# Patient Record
Sex: MALE | Race: BLACK OR AFRICAN AMERICAN | Employment: OTHER | URBAN - METROPOLITAN AREA
[De-identification: names, ages, dates, MRNs, and addresses within clinical notes are randomized per-mention and may not be internally consistent; named-entity substitution may affect disease eponyms.]

---

## 2023-10-26 ENCOUNTER — HOSPITAL ENCOUNTER (EMERGENCY)
Age: 38
Discharge: LEFT BEFORE DISCHARGE | End: 2023-10-26
Payer: MEDICARE

## 2023-10-26 ENCOUNTER — HOSPITAL ENCOUNTER (EMERGENCY)
Age: 38
Discharge: PSYCHIATRIC OR SUBSTANCE USE FACILITY | End: 2023-10-27
Payer: MEDICARE

## 2023-10-26 VITALS
HEIGHT: 70 IN | DIASTOLIC BLOOD PRESSURE: 88 MMHG | BODY MASS INDEX: 30.78 KG/M2 | HEART RATE: 103 BPM | WEIGHT: 215 LBS | OXYGEN SATURATION: 95 % | SYSTOLIC BLOOD PRESSURE: 154 MMHG | TEMPERATURE: 98.1 F

## 2023-10-26 DIAGNOSIS — F25.9 SCHIZOAFFECTIVE DISORDER, UNSPECIFIED TYPE (HCC): Primary | ICD-10-CM

## 2023-10-26 DIAGNOSIS — Z53.21 ELOPED FROM EMERGENCY DEPARTMENT: Primary | ICD-10-CM

## 2023-10-26 LAB
ALBUMIN SERPL-MCNC: 4.2 G/DL (ref 3.5–4.6)
ALP SERPL-CCNC: 79 U/L (ref 35–104)
ALT SERPL-CCNC: 66 U/L (ref 0–41)
AMPHET UR QL SCN: NORMAL
ANION GAP SERPL CALCULATED.3IONS-SCNC: 11 MEQ/L (ref 9–15)
APAP SERPL-MCNC: <5 UG/ML (ref 10–30)
AST SERPL-CCNC: 35 U/L (ref 0–40)
BARBITURATES UR QL SCN: NORMAL
BASOPHILS # BLD: 0 K/UL (ref 0–0.2)
BASOPHILS NFR BLD: 0.6 %
BENZODIAZ UR QL SCN: NORMAL
BILIRUB SERPL-MCNC: <0.2 MG/DL (ref 0.2–0.7)
BUN SERPL-MCNC: 10 MG/DL (ref 6–20)
CALCIUM SERPL-MCNC: 9.2 MG/DL (ref 8.5–9.9)
CANNABINOIDS UR QL SCN: NORMAL
CHLORIDE SERPL-SCNC: 100 MEQ/L (ref 95–107)
CHOLEST SERPL-MCNC: 219 MG/DL (ref 0–199)
CK SERPL-CCNC: 638 U/L (ref 0–190)
CO2 SERPL-SCNC: 24 MEQ/L (ref 20–31)
COCAINE UR QL SCN: NORMAL
CREAT SERPL-MCNC: 0.82 MG/DL (ref 0.7–1.2)
DRUG SCREEN COMMENT UR-IMP: NORMAL
EOSINOPHIL # BLD: 0.1 K/UL (ref 0–0.7)
EOSINOPHIL NFR BLD: 2 %
ERYTHROCYTE [DISTWIDTH] IN BLOOD BY AUTOMATED COUNT: 14.1 % (ref 11.5–14.5)
ETHANOL PERCENT: NORMAL G/DL
ETHANOLAMINE SERPL-MCNC: <10 MG/DL (ref 0–0.08)
FENTANYL SCREEN, URINE: NORMAL
GLOBULIN SER CALC-MCNC: 3.2 G/DL (ref 2.3–3.5)
GLUCOSE SERPL-MCNC: 110 MG/DL (ref 70–99)
HCT VFR BLD AUTO: 38.8 % (ref 42–52)
HDLC SERPL-MCNC: 56 MG/DL (ref 40–59)
HGB BLD-MCNC: 12.7 G/DL (ref 14–18)
LDLC SERPL CALC-MCNC: 106 MG/DL (ref 0–129)
LYMPHOCYTES # BLD: 2.5 K/UL (ref 1–4.8)
LYMPHOCYTES NFR BLD: 35.7 %
MCH RBC QN AUTO: 27.5 PG (ref 27–31.3)
MCHC RBC AUTO-ENTMCNC: 32.7 % (ref 33–37)
MCV RBC AUTO: 84.2 FL (ref 79–92.2)
METHADONE UR QL SCN: NORMAL
MONOCYTES # BLD: 0.9 K/UL (ref 0.2–0.8)
MONOCYTES NFR BLD: 12.5 %
NEUTROPHILS # BLD: 3.3 K/UL (ref 1.4–6.5)
NEUTS SEG NFR BLD: 47.8 %
OPIATES UR QL SCN: NORMAL
OXYCODONE UR QL SCN: NORMAL
PCP UR QL SCN: NORMAL
PLATELET # BLD AUTO: 366 K/UL (ref 130–400)
POTASSIUM SERPL-SCNC: 3.4 MEQ/L (ref 3.4–4.9)
PROPOXYPH UR QL SCN: NORMAL
PROT SERPL-MCNC: 7.4 G/DL (ref 6.3–8)
RBC # BLD AUTO: 4.61 M/UL (ref 4.7–6.1)
SALICYLATES SERPL-MCNC: <0.3 MG/DL (ref 15–30)
SODIUM SERPL-SCNC: 135 MEQ/L (ref 135–144)
TRIGL SERPL-MCNC: 286 MG/DL (ref 0–150)
TSH SERPL-MCNC: 1.08 UIU/ML (ref 0.44–3.86)
WBC # BLD AUTO: 6.9 K/UL (ref 4.8–10.8)

## 2023-10-26 PROCEDURE — 96372 THER/PROPH/DIAG INJ SC/IM: CPT

## 2023-10-26 PROCEDURE — 99285 EMERGENCY DEPT VISIT HI MDM: CPT

## 2023-10-26 PROCEDURE — 82077 ASSAY SPEC XCP UR&BREATH IA: CPT

## 2023-10-26 PROCEDURE — 80061 LIPID PANEL: CPT

## 2023-10-26 PROCEDURE — 84443 ASSAY THYROID STIM HORMONE: CPT

## 2023-10-26 PROCEDURE — 6360000002 HC RX W HCPCS: Performed by: STUDENT IN AN ORGANIZED HEALTH CARE EDUCATION/TRAINING PROGRAM

## 2023-10-26 PROCEDURE — 80307 DRUG TEST PRSMV CHEM ANLYZR: CPT

## 2023-10-26 PROCEDURE — 80053 COMPREHEN METABOLIC PANEL: CPT

## 2023-10-26 PROCEDURE — 36415 COLL VENOUS BLD VENIPUNCTURE: CPT

## 2023-10-26 PROCEDURE — 85025 COMPLETE CBC W/AUTO DIFF WBC: CPT

## 2023-10-26 PROCEDURE — 80179 DRUG ASSAY SALICYLATE: CPT

## 2023-10-26 PROCEDURE — 80143 DRUG ASSAY ACETAMINOPHEN: CPT

## 2023-10-26 PROCEDURE — 99283 EMERGENCY DEPT VISIT LOW MDM: CPT

## 2023-10-26 PROCEDURE — 82550 ASSAY OF CK (CPK): CPT

## 2023-10-26 RX ORDER — DIPHENHYDRAMINE HYDROCHLORIDE 50 MG/ML
50 INJECTION INTRAMUSCULAR; INTRAVENOUS ONCE
Status: COMPLETED | OUTPATIENT
Start: 2023-10-26 | End: 2023-10-26

## 2023-10-26 RX ORDER — HALOPERIDOL 5 MG/ML
5 INJECTION INTRAMUSCULAR ONCE
Status: COMPLETED | OUTPATIENT
Start: 2023-10-26 | End: 2023-10-26

## 2023-10-26 RX ORDER — LORAZEPAM 2 MG/ML
2 INJECTION INTRAMUSCULAR ONCE
Status: COMPLETED | OUTPATIENT
Start: 2023-10-26 | End: 2023-10-26

## 2023-10-26 RX ORDER — FLUOXETINE HYDROCHLORIDE 20 MG/1
40 CAPSULE ORAL DAILY
Qty: 60 CAPSULE | Refills: 0 | Status: SHIPPED | OUTPATIENT
Start: 2023-10-26 | End: 2023-11-25

## 2023-10-26 RX ADMIN — LORAZEPAM 2 MG: 2 INJECTION INTRAMUSCULAR; INTRAVENOUS at 15:23

## 2023-10-26 RX ADMIN — HALOPERIDOL LACTATE 5 MG: 5 INJECTION, SOLUTION INTRAMUSCULAR at 15:23

## 2023-10-26 RX ADMIN — DIPHENHYDRAMINE HYDROCHLORIDE 50 MG: 50 INJECTION INTRAMUSCULAR; INTRAVENOUS at 15:22

## 2023-10-26 ASSESSMENT — ENCOUNTER SYMPTOMS
ABDOMINAL PAIN: 0
VOMITING: 0
NAUSEA: 0
PHOTOPHOBIA: 0
SHORTNESS OF BREATH: 0
COUGH: 0
WHEEZING: 0
NAUSEA: 0
SHORTNESS OF BREATH: 0
PHOTOPHOBIA: 0
WHEEZING: 0
COUGH: 0
ABDOMINAL PAIN: 0
VOMITING: 0

## 2023-10-26 ASSESSMENT — PATIENT HEALTH QUESTIONNAIRE - PHQ9
SUM OF ALL RESPONSES TO PHQ QUESTIONS 1-9: 1
SUM OF ALL RESPONSES TO PHQ9 QUESTIONS 1 & 2: 1
SUM OF ALL RESPONSES TO PHQ QUESTIONS 1-9: 0
2. FEELING DOWN, DEPRESSED OR HOPELESS: 0
1. LITTLE INTEREST OR PLEASURE IN DOING THINGS: 0
SUM OF ALL RESPONSES TO PHQ9 QUESTIONS 1 & 2: 0
SUM OF ALL RESPONSES TO PHQ QUESTIONS 1-9: 0
SUM OF ALL RESPONSES TO PHQ QUESTIONS 1-9: 1
SUM OF ALL RESPONSES TO PHQ QUESTIONS 1-9: 0
SUM OF ALL RESPONSES TO PHQ QUESTIONS 1-9: 1
SUM OF ALL RESPONSES TO PHQ QUESTIONS 1-9: 0
SUM OF ALL RESPONSES TO PHQ QUESTIONS 1-9: 1
1. LITTLE INTEREST OR PLEASURE IN DOING THINGS: 0
2. FEELING DOWN, DEPRESSED OR HOPELESS: 1

## 2023-10-26 ASSESSMENT — LIFESTYLE VARIABLES
HOW OFTEN DO YOU HAVE A DRINK CONTAINING ALCOHOL: NEVER
HOW MANY STANDARD DRINKS CONTAINING ALCOHOL DO YOU HAVE ON A TYPICAL DAY: PATIENT DOES NOT DRINK

## 2023-10-26 ASSESSMENT — PAIN - FUNCTIONAL ASSESSMENT: PAIN_FUNCTIONAL_ASSESSMENT: NONE - DENIES PAIN

## 2023-10-26 ASSESSMENT — PAIN SCALES - GENERAL: PAINLEVEL_OUTOF10: 0
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
## 2023-10-26 NOTE — ED PROVIDER NOTES
[facility] ED  EMERGENCY DEPARTMENT ENCOUNTER      Pt Name: Merlin Sober  MRN: [id number]  [address] [place] 1985  Date of evaluation: 10/26/2023  Provider: Casey Rowland PA-C    CHIEF COMPLAINT     No chief complaint on file. HISTORY OF PRESENT ILLNESS   (Location/Symptom, Timing/Onset, Context/Setting, Quality, Duration, Modifying Factors, Severity)  Note limiting factors. Merlin Sober is a 41 y.o. male who per chart review has pmhx of bipolar 1 disorder, paranoid schizophrenia presents to the emergency department for psychiatric evaluation. Was sent here from [facility] facility. Per [facility], pt was sent there for drug rehab after being discharged from [facility]. He has been at [facility] for about 1 week. Apparently frandyr Luretha Lanes did not send the patient with any of his prescriptions and will not return their phone calls therefore he has been without his psychiatric meds for 1 week. They have noticed that he has been paranoid, believes the [place] is after him and that there are microphones in the ceiling. Pt was evaluated by In[facility] Quezada while at [facility] and was deemed to be psychotic but not appropriate to participate in In[facility] Spoon groups due to his psychosis. [facility] contacted [facility] for admission but they were told pt needed to come to [facility] for placement. Pt denies SI, HI, AVH. States he feels his schizophrenia is baseline. HPI    Nursing Notes were reviewed. REVIEW OF SYSTEMS    (2-9 systems for level 4, 10 or more for level 5)     Review of Systems   Constitutional:  Negative for chills and fever. HENT:  Negative for congestion. Eyes:  Negative for photophobia. Respiratory:  Negative for cough, shortness of breath and wheezing. Cardiovascular:  Negative for chest pain and palpitations. Gastrointestinal:  Negative for abdominal pain, nausea and vomiting.    Genitourinary:  Negative for dysuria, frequency and threatening to punch the window. Code violet was called. Pt given IM ativan benadryl and haldol at this time due to concern for staff and patient safety. Medically cleared for psychiatric evaluation. REASSESSMENT      Patient seen, evaluated and will be transferred to outside facility for inpatient psychiatric care    CRITICAL CARE TIME   Total Critical Care time was 0 minutes, excluding separately reportable procedures. There was a high probability of clinically significant/life threatening deterioration in the patient's condition which required my urgent intervention. CONSULTS:  None    PROCEDURES:  Unless otherwise noted below, none     Procedures        FINAL IMPRESSION      1. Schizoaffective disorder, unspecified type [facility])          DISPOSITION/PLAN   DISPOSITION Decision To Transfer 10/26/2023 07:55:03 PM      PATIENT REFERRED TO:  No follow-up provider specified.     DISCHARGE MEDICATIONS:  New Prescriptions    No medications on file     Controlled Substances Monitoring:          No data to display                (Please note that portions of this note were completed with a voice recognition program.  Efforts were made to edit the dictations but occasionally words are mis-transcribed.)    Junior James PA-C (electronically signed)    Supervising: Tashia Esquivel PA-C  10/26/23 1956

## 2023-10-26 NOTE — ED NOTES
Pt cooperative and resting in bed. No distress noted. Medication effective.      Maria Guadalupe Kemp, RN  10/26/23 789 [facility], [address] [place] [address] Dr RN  10/26/23 1396

## 2023-10-26 NOTE — ED NOTES
Report received from MYLES Davis and Zachary at [facility]. Patient was admitted to [facility] approximately one week ago after receiving treatment at [facility]. The patient arrived at rehab with no discharge medications sent to the pharmacy and patient has been off his prescribed medications. Hong Nelson reports the therapist at [facility] assessed the patient today for IOP and the patient admitted auditory hallucinations, paranoia and unable to participate in programming due to severe symptoms of his mental illness. Hong Nelson reports the patient will be able to return to [facility] after treatment/stabilization.       Edelmira Brewer RN  10/26/23 8492

## 2023-10-26 NOTE — ED NOTES
Pt. irritable, went into bathroom and punched wall and slammed the door. Then came up to window by nurses station with fist raised and swinging, threatening to punch the window and demanding for ativan.       Pavel Oliva RN  10/26/23 9902

## 2023-10-26 NOTE — ED TRIAGE NOTES
Pt presents to ER from recently being out of [facility] [address] and was not given the proper refill sent to his pharmacy. Pt is A&Ox4, warm and dry not medical issues noted at this time.

## 2023-10-26 NOTE — ED NOTES
Lab at bedside. Patient agreeable to the phlebotomist obtaining blood specimens then while drawing the ETOH patient moved arm and refused. Updated patient on process of medical clearance. Patient agreeable to allow labs to be completed. While at bedside for lab standby assist, patient was found with a pen in his possession. When pen was removed from patient began heckling the writer stating \"oh you are the MVP now ain't ya, you saved the day! I'm so glad you were able to save us all from that big bad pen bitch. \" Reviewed unit rules and safety of all patients. Patient refused teaching at this time.      Nathaniel Dan, RUPESH  10/26/23 8962

## 2023-10-26 NOTE — ED PROVIDER NOTES
[facility] ED  EMERGENCY DEPARTMENT ENCOUNTER      Pt Name: Mayuri Chaves  MRN: [id number]  [address] [facility] [place] 1985  Date of evaluation: 10/26/2023  Provider: Margaret Rangel, [address] [facility]       Chief Complaint   Patient presents with    Medication Refill         HISTORY OF PRESENT ILLNESS   (Location/Symptom, Timing/Onset, Context/Setting, Quality, Duration, Modifying Factors, Severity)  Note limiting factors. Mayuri Chaves is a 41 y.o. male who presents to the emergency department for med refill. States been out of prozac for 3 days. Takes 40 mg daily for the last several years. Otherwise compliant with other meds. No side effects. No other complaints at this time. HPI    Nursing Notes were reviewed. REVIEW OF SYSTEMS    (2-9 systems for level 4, 10 or more for level 5)     Review of Systems   Constitutional:  Negative for chills and fever. HENT:  Negative for congestion. Eyes:  Negative for photophobia. Respiratory:  Negative for cough, shortness of breath and wheezing. Cardiovascular:  Negative for chest pain and palpitations. Gastrointestinal:  Negative for abdominal pain, nausea and vomiting. Genitourinary:  Negative for dysuria, frequency and hematuria. Musculoskeletal:  Negative for myalgias. Allergic/Immunologic: Negative for immunocompromised state. Neurological:  Negative for dizziness, weakness and headaches. All other systems reviewed and are negative. Except as noted above the remainder of the review of systems was reviewed and negative. PAST MEDICAL HISTORY     Past Medical History:   Diagnosis Date    Bipolar 1 disorder ([address])     Paranoid ([address])     Schizo affective schizophrenia ([address])          SURGICAL HISTORY     No past surgical history on file.       CURRENT MEDICATIONS       Discharge Medication List as of 10/26/2023  1:16 PM        CONTINUE these medications which have NOT CHANGED    Details   OLANZapine (ZYPREXA PO) Take by that he was sent for a psychiatric evaluation. They state he has been with them for less than a week. Has been paranoid, believes there are microphones in the ceiling and that tavares is after him. Juan Mar states that he was not appropriate to do groups because of his psychosis. Pt was at [facility] prior to [employer]. [facility] get [facility] but were told that the patient needed to come to [facility] to be admitted there which is why he was sent here. Pt states that [facility] is \"making up lies\" and that he does not want to go back there and wants to call his family to come pick him up instead. He feels his schizophrenia is baseline and that Raya Crowder is not in crisis\" and has been taking his medications. He denies SI, HI, AVH. I had patient wait until I could discuss this with our [facility] unit. Plan was to bring patient back to [facility] unit while we cleared up the story with security at side. When security and I got back to fast track to escort him back to [facility], patient had eloped from the ED. Apparently pt had been dropped off by [facility] police to the ED however no report was given to us from either [facility] or police, they dropped patient off in triage and left therefore we not aware of the true reason he was brought to the ED. Pt was never pink slipped by anyone prior to his arrival. Pt found walking outside of the ED. Pt was pink slipped and brought back into the [facility] unit by [employer]. REASSESSMENT          CRITICAL CARE TIME   Total Critical Care time was 0 minutes, excluding separately reportable procedures. There was a high probability of clinically significant/life threatening deterioration in the patient's condition which required my urgent intervention. CONSULTS:  None    PROCEDURES:  Unless otherwise noted below, none     Procedures        FINAL IMPRESSION      1.  Eloped from emergency department          DISPOSITION/PLAN   DISPOSITION Eloped - Left

## 2023-10-26 NOTE — ED NOTES
Pt to [facility] bed 5. Changed into psych safe clothing and skin intact. Belongings secured and no contraband found. Lab notified of new pt and new orders. Brenna ARANGO to see new pt.      Scott Deleon RN  10/26/23 9008
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
## 2023-10-27 VITALS
SYSTOLIC BLOOD PRESSURE: 110 MMHG | HEART RATE: 92 BPM | OXYGEN SATURATION: 98 % | RESPIRATION RATE: 16 BRPM | TEMPERATURE: 97.6 F | DIASTOLIC BLOOD PRESSURE: 64 MMHG

## 2023-10-27 NOTE — ED NOTES
Patient resting quietly. Respirations are even and unlabored. No distress noted at this time.       Qing Bray RN  10/26/23 9399

## 2023-10-27 NOTE — ED NOTES
Patient resting quietly. Respirations are even and unlabored. No distress noted at this time.       Shen Vargas RN  10/26/23 2033

## 2023-10-27 NOTE — ED NOTES
Patient was transferred to [facility] [address]. He is calm and cooperative. [employer] present.       Angel Huynh RN  10/27/23 0021

## 2023-10-27 NOTE — ED NOTES
Patient resting quietly. Respirations are even and unlabored. No distress noted at this time.       Pablo Mitchell RN  10/26/23 0424

## 2023-11-04 ENCOUNTER — HOSPITAL ENCOUNTER (OUTPATIENT)
Facility: HOSPITAL | Age: 38
Setting detail: OBSERVATION
LOS: 1 days | Discharge: PSYCHIATRIC OR SUBSTANCE USE FACILITY | DRG: 558 | End: 2023-11-08
Attending: EMERGENCY MEDICINE | Admitting: STUDENT IN AN ORGANIZED HEALTH CARE EDUCATION/TRAINING PROGRAM
Payer: MEDICARE

## 2023-11-04 DIAGNOSIS — F25.0 SCHIZOAFFECTIVE DISORDER, BIPOLAR TYPE (MULTI): ICD-10-CM

## 2023-11-04 DIAGNOSIS — R74.8 ELEVATED CREATINE KINASE: Primary | ICD-10-CM

## 2023-11-04 PROBLEM — F19.959 PSYCHOACTIVE SUBSTANCE-INDUCED PSYCHOSIS (MULTI): Status: ACTIVE | Noted: 2023-10-06

## 2023-11-04 PROBLEM — F15.90 STIMULANT USE DISORDER: Status: RESOLVED | Noted: 2023-03-15 | Resolved: 2023-11-04

## 2023-11-04 PROBLEM — N28.9 RENAL DISEASE: Status: RESOLVED | Noted: 2019-07-23 | Resolved: 2023-11-04

## 2023-11-04 PROBLEM — F12.90 CANNABIS USE DISORDER: Status: RESOLVED | Noted: 2023-03-15 | Resolved: 2023-11-04

## 2023-11-04 PROBLEM — M62.82 RHABDOMYOLYSIS: Status: ACTIVE | Noted: 2023-10-05

## 2023-11-04 PROBLEM — F20.9 SCHIZOPHRENIA (MULTI): Status: ACTIVE | Noted: 2023-08-01

## 2023-11-04 PROBLEM — E87.8 ELECTROLYTE DISORDER: Status: RESOLVED | Noted: 2019-07-23 | Resolved: 2023-11-04
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
## 2023-11-04 PROBLEM — F15.929: Status: RESOLVED | Noted: 2019-07-24 | Resolved: 2023-11-04

## 2023-11-04 PROBLEM — E66.9 OBESITY, UNSPECIFIED: Status: ACTIVE | Noted: 2021-08-30

## 2023-11-04 PROBLEM — S81.859A: Status: RESOLVED | Noted: 2021-11-01 | Resolved: 2023-11-04

## 2023-11-04 PROBLEM — R45.850 HOMICIDAL THOUGHTS: Status: RESOLVED | Noted: 2023-11-04 | Resolved: 2023-11-04

## 2023-11-04 PROBLEM — F19.21: Chronic | Status: RESOLVED | Noted: 2022-04-15 | Resolved: 2023-11-04

## 2023-11-04 PROBLEM — R46.89 AGGRESSIVE BEHAVIOR: Status: RESOLVED | Noted: 2019-07-26 | Resolved: 2023-11-04

## 2023-11-04 PROBLEM — F19.959 SUBSTANCE-INDUCED PSYCHOTIC DISORDER (MULTI): Status: RESOLVED | Noted: 2023-03-13 | Resolved: 2023-11-04

## 2023-11-04 PROBLEM — F17.200 NICOTINE DEPENDENCE: Status: ACTIVE | Noted: 2021-08-30

## 2023-11-04 PROBLEM — L03.90 CELLULITIS: Status: RESOLVED | Noted: 2021-11-01 | Resolved: 2023-11-04

## 2023-11-04 PROBLEM — F20.0 CHRONIC PARANOID SCHIZOPHRENIA (MULTI): Status: ACTIVE | Noted: 2023-11-04

## 2023-11-04 PROBLEM — F23: Status: ACTIVE | Noted: 2021-11-01

## 2023-11-04 PROBLEM — F19.10 POLYSUBSTANCE ABUSE (MULTI): Status: ACTIVE | Noted: 2023-07-29

## 2023-11-04 PROBLEM — D75.839 THROMBOCYTOSIS: Chronic | Status: RESOLVED | Noted: 2021-11-04 | Resolved: 2023-11-04

## 2023-11-04 PROBLEM — F25.9 SCHIZOAFFECTIVE DISORDER (MULTI): Status: ACTIVE | Noted: 2021-08-30

## 2023-11-04 LAB
ALBUMIN SERPL BCP-MCNC: 4.5 G/DL (ref 3.4–5)
ALP SERPL-CCNC: 74 U/L (ref 33–120)
ALT SERPL W P-5'-P-CCNC: 65 U/L (ref 10–52)
ANION GAP SERPL CALC-SCNC: 13 MMOL/L (ref 10–20)
APAP SERPL-MCNC: <10 UG/ML
AST SERPL W P-5'-P-CCNC: 82 U/L (ref 9–39)
BASOPHILS # BLD AUTO: 0.02 X10*3/UL (ref 0–0.1)
BASOPHILS NFR BLD AUTO: 0.2 %
BILIRUB SERPL-MCNC: 1.3 MG/DL (ref 0–1.2)
BUN SERPL-MCNC: 19 MG/DL (ref 6–23)
CALCIUM SERPL-MCNC: 9.9 MG/DL (ref 8.6–10.3)
CHLORIDE SERPL-SCNC: 98 MMOL/L (ref 98–107)
CK SERPL-CCNC: 3410 U/L (ref 0–325)
CO2 SERPL-SCNC: 28 MMOL/L (ref 21–32)
CREAT SERPL-MCNC: 1.11 MG/DL (ref 0.5–1.3)
EOSINOPHIL # BLD AUTO: 0.09 X10*3/UL (ref 0–0.7)
EOSINOPHIL NFR BLD AUTO: 0.9 %
ERYTHROCYTE [DISTWIDTH] IN BLOOD BY AUTOMATED COUNT: 14.1 % (ref 11.5–14.5)
ETHANOL SERPL-MCNC: <10 MG/DL
GFR SERPL CREATININE-BSD FRML MDRD: 88 ML/MIN/1.73M*2
GLUCOSE SERPL-MCNC: 103 MG/DL (ref 74–99)
HCT VFR BLD AUTO: 38.3 % (ref 41–52)
HGB BLD-MCNC: 12.8 G/DL (ref 13.5–17.5)
IMM GRANULOCYTES # BLD AUTO: 0.02 X10*3/UL (ref 0–0.7)
IMM GRANULOCYTES NFR BLD AUTO: 0.2 % (ref 0–0.9)
LYMPHOCYTES # BLD AUTO: 2.6 X10*3/UL (ref 1.2–4.8)
LYMPHOCYTES NFR BLD AUTO: 26.4 %
MCH RBC QN AUTO: 27.8 PG (ref 26–34)
MCHC RBC AUTO-ENTMCNC: 33.4 G/DL (ref 32–36)
MCV RBC AUTO: 83 FL (ref 80–100)
MONOCYTES # BLD AUTO: 0.96 X10*3/UL (ref 0.1–1)
MONOCYTES NFR BLD AUTO: 9.7 %
NEUTROPHILS # BLD AUTO: 6.16 X10*3/UL (ref 1.2–7.7)
NEUTROPHILS NFR BLD AUTO: 62.6 %
NRBC BLD-RTO: 0 /100 WBCS (ref 0–0)
PLATELET # BLD AUTO: 412 X10*3/UL (ref 150–450)
POTASSIUM SERPL-SCNC: 4.2 MMOL/L (ref 3.5–5.3)
PROT SERPL-MCNC: 7.9 G/DL (ref 6.4–8.2)
RBC # BLD AUTO: 4.61 X10*6/UL (ref 4.5–5.9)
SALICYLATES SERPL-MCNC: <3 MG/DL
SARS-COV-2 RNA RESP QL NAA+PROBE: NOT DETECTED
SODIUM SERPL-SCNC: 135 MMOL/L (ref 136–145)
WBC # BLD AUTO: 9.9 X10*3/UL (ref 4.4–11.3)

## 2023-11-04 PROCEDURE — 96372 THER/PROPH/DIAG INJ SC/IM: CPT | Performed by: STUDENT IN AN ORGANIZED HEALTH CARE EDUCATION/TRAINING PROGRAM

## 2023-11-04 PROCEDURE — 2500000004 HC RX 250 GENERAL PHARMACY W/ HCPCS (ALT 636 FOR OP/ED): Performed by: STUDENT IN AN ORGANIZED HEALTH CARE EDUCATION/TRAINING PROGRAM

## 2023-11-04 PROCEDURE — 80053 COMPREHEN METABOLIC PANEL: CPT

## 2023-11-04 PROCEDURE — 87635 SARS-COV-2 COVID-19 AMP PRB: CPT | Performed by: EMERGENCY MEDICINE

## 2023-11-04 PROCEDURE — G0378 HOSPITAL OBSERVATION PER HR: HCPCS

## 2023-11-04 PROCEDURE — 80329 ANALGESICS NON-OPIOID 1 OR 2: CPT

## 2023-11-04 PROCEDURE — 99285 EMERGENCY DEPT VISIT HI MDM: CPT | Performed by: EMERGENCY MEDICINE

## 2023-11-04 PROCEDURE — 2500000001 HC RX 250 WO HCPCS SELF ADMINISTERED DRUGS (ALT 637 FOR MEDICARE OP): Performed by: STUDENT IN AN ORGANIZED HEALTH CARE EDUCATION/TRAINING PROGRAM

## 2023-11-04 PROCEDURE — 36415 COLL VENOUS BLD VENIPUNCTURE: CPT

## 2023-11-04 PROCEDURE — 2500000004 HC RX 250 GENERAL PHARMACY W/ HCPCS (ALT 636 FOR OP/ED)

## 2023-11-04 PROCEDURE — 85025 COMPLETE CBC W/AUTO DIFF WBC: CPT

## 2023-11-04 PROCEDURE — 82550 ASSAY OF CK (CPK): CPT

## 2023-11-04 PROCEDURE — 99223 1ST HOSP IP/OBS HIGH 75: CPT | Performed by: STUDENT IN AN ORGANIZED HEALTH CARE EDUCATION/TRAINING PROGRAM

## 2023-11-04 PROCEDURE — 93010 ELECTROCARDIOGRAM REPORT: CPT | Performed by: INTERNAL MEDICINE

## 2023-11-04 PROCEDURE — 1200000002 HC GENERAL ROOM WITH TELEMETRY DAILY

## 2023-11-04 PROCEDURE — 96361 HYDRATE IV INFUSION ADD-ON: CPT

## 2023-11-04 PROCEDURE — 1100000001 HC PRIVATE ROOM DAILY

## 2023-11-04 PROCEDURE — 96360 HYDRATION IV INFUSION INIT: CPT

## 2023-11-04 RX ORDER — ACETAMINOPHEN 325 MG/1
650 TABLET ORAL ONCE
Status: COMPLETED | OUTPATIENT
Start: 2023-11-04 | End: 2023-11-04

## 2023-11-04 RX ORDER — FLUOXETINE HYDROCHLORIDE 20 MG/1
40 CAPSULE ORAL DAILY
Status: DISCONTINUED | OUTPATIENT
Start: 2023-11-05 | End: 2023-11-09 | Stop reason: HOSPADM

## 2023-11-04 RX ORDER — ACETAMINOPHEN 650 MG/1
650 SUPPOSITORY RECTAL EVERY 4 HOURS PRN
Status: DISCONTINUED | OUTPATIENT
Start: 2023-11-04 | End: 2023-11-09 | Stop reason: HOSPADM

## 2023-11-04 RX ORDER — OLANZAPINE 5 MG/1
5 TABLET ORAL NIGHTLY
COMMUNITY

## 2023-11-04 RX ORDER — MICONAZOLE NITRATE 2 %
4 CREAM (GRAM) TOPICAL AS NEEDED
Status: DISCONTINUED | OUTPATIENT
Start: 2023-11-04 | End: 2023-11-09 | Stop reason: HOSPADM

## 2023-11-04 RX ORDER — SODIUM CHLORIDE, SODIUM LACTATE, POTASSIUM CHLORIDE, CALCIUM CHLORIDE 600; 310; 30; 20 MG/100ML; MG/100ML; MG/100ML; MG/100ML
75 INJECTION, SOLUTION INTRAVENOUS CONTINUOUS
Status: DISCONTINUED | OUTPATIENT
Start: 2023-11-04 | End: 2023-11-09 | Stop reason: HOSPADM

## 2023-11-04 RX ORDER — TOPIRAMATE 25 MG/1
25 TABLET ORAL NIGHTLY
Status: DISCONTINUED | OUTPATIENT
Start: 2023-11-04 | End: 2023-11-09 | Stop reason: HOSPADM

## 2023-11-04 RX ORDER — ACETAMINOPHEN 160 MG/5ML
650 SOLUTION ORAL EVERY 4 HOURS PRN
Status: DISCONTINUED | OUTPATIENT
Start: 2023-11-04 | End: 2023-11-09 | Stop reason: HOSPADM

## 2023-11-04 RX ORDER — TOPIRAMATE 25 MG/1
25 TABLET ORAL NIGHTLY
COMMUNITY
End: 2024-04-13 | Stop reason: ENTERED-IN-ERROR

## 2023-11-04 RX ORDER — LORAZEPAM 2 MG/ML
1 INJECTION INTRAMUSCULAR ONCE
Status: COMPLETED | OUTPATIENT
Start: 2023-11-04 | End: 2023-11-04

## 2023-11-04 RX ORDER — LURASIDONE HYDROCHLORIDE 40 MG/1
40 TABLET, FILM COATED ORAL
Status: DISCONTINUED | OUTPATIENT
Start: 2023-11-05 | End: 2023-11-09 | Stop reason: HOSPADM

## 2023-11-04 RX ORDER — ONDANSETRON HYDROCHLORIDE 2 MG/ML
4 INJECTION, SOLUTION INTRAVENOUS EVERY 8 HOURS PRN
Status: DISCONTINUED | OUTPATIENT
Start: 2023-11-04 | End: 2023-11-09 | Stop reason: HOSPADM

## 2023-11-04 RX ORDER — ACETAMINOPHEN 325 MG/1
650 TABLET ORAL EVERY 4 HOURS PRN
Status: DISCONTINUED | OUTPATIENT
Start: 2023-11-04 | End: 2023-11-09 | Stop reason: HOSPADM

## 2023-11-04 RX ORDER — OLANZAPINE 5 MG/1
10 TABLET, ORALLY DISINTEGRATING ORAL ONCE
Status: COMPLETED | OUTPATIENT
Start: 2023-11-04 | End: 2023-11-04

## 2023-11-04 RX ORDER — LURASIDONE HYDROCHLORIDE 40 MG/1
40 TABLET, FILM COATED ORAL
COMMUNITY
End: 2024-04-13 | Stop reason: ENTERED-IN-ERROR

## 2023-11-04 RX ORDER — POLYETHYLENE GLYCOL 3350 17 G/17G
17 POWDER, FOR SOLUTION ORAL DAILY
Status: DISCONTINUED | OUTPATIENT
Start: 2023-11-05 | End: 2023-11-09 | Stop reason: HOSPADM

## 2023-11-04 RX ORDER — DIPHENHYDRAMINE HCL 25 MG
4 CAPSULE ORAL AS NEEDED
COMMUNITY
End: 2024-04-13 | Stop reason: ENTERED-IN-ERROR

## 2023-11-04 RX ORDER — FLUOXETINE HYDROCHLORIDE 40 MG/1
40 CAPSULE ORAL DAILY
COMMUNITY
End: 2024-04-13 | Stop reason: ENTERED-IN-ERROR

## 2023-11-04 RX ORDER — CHOLECALCIFEROL (VITAMIN D3) 1250 MCG
1250 TABLET ORAL
COMMUNITY
End: 2024-04-13 | Stop reason: ENTERED-IN-ERROR

## 2023-11-04 RX ORDER — ONDANSETRON 4 MG/1
4 TABLET, ORALLY DISINTEGRATING ORAL EVERY 8 HOURS PRN
Status: DISCONTINUED | OUTPATIENT
Start: 2023-11-04 | End: 2023-11-09 | Stop reason: HOSPADM

## 2023-11-04 RX ORDER — ENOXAPARIN SODIUM 100 MG/ML
40 INJECTION SUBCUTANEOUS EVERY 24 HOURS
Status: DISCONTINUED | OUTPATIENT
Start: 2023-11-04 | End: 2023-11-09 | Stop reason: HOSPADM

## 2023-11-04 RX ADMIN — TOPIRAMATE 25 MG: 25 TABLET, FILM COATED ORAL at 21:59

## 2023-11-04 RX ADMIN — OLANZAPINE 15 MG: 10 TABLET, FILM COATED ORAL at 21:59

## 2023-11-04 RX ADMIN — SODIUM CHLORIDE, POTASSIUM CHLORIDE, SODIUM LACTATE AND CALCIUM CHLORIDE 75 ML/HR: 600; 310; 30; 20 INJECTION, SOLUTION INTRAVENOUS at 21:58

## 2023-11-04 RX ADMIN — OLANZAPINE 10 MG: 5 TABLET, ORALLY DISINTEGRATING ORAL at 16:22

## 2023-11-04 RX ADMIN — LORAZEPAM 1 MG: 2 INJECTION INTRAMUSCULAR; INTRAVENOUS at 15:46

## 2023-11-04 RX ADMIN — SODIUM CHLORIDE, POTASSIUM CHLORIDE, SODIUM LACTATE AND CALCIUM CHLORIDE 1000 ML: 600; 310; 30; 20 INJECTION, SOLUTION INTRAVENOUS at 15:46

## 2023-11-04 RX ADMIN — ACETAMINOPHEN 650 MG: 325 TABLET ORAL at 16:49

## 2023-11-04 SDOH — ECONOMIC STABILITY: INCOME INSECURITY: HOW HARD IS IT FOR YOU TO PAY FOR THE VERY BASICS LIKE FOOD, HOUSING, MEDICAL CARE, AND HEATING?: SOMEWHAT HARD

## 2023-11-04 SDOH — SOCIAL STABILITY: SOCIAL INSECURITY: DO YOU FEEL ANYONE HAS EXPLOITED OR TAKEN ADVANTAGE OF YOU FINANCIALLY OR OF YOUR PERSONAL PROPERTY?: NO

## 2023-11-04 SDOH — ECONOMIC STABILITY: HOUSING INSECURITY
IN THE LAST 12 MONTHS, WAS THERE A TIME WHEN YOU DID NOT HAVE A STEADY PLACE TO SLEEP OR SLEPT IN A SHELTER (INCLUDING NOW)?: YES

## 2023-11-04 SDOH — ECONOMIC STABILITY: FOOD INSECURITY: WITHIN THE PAST 12 MONTHS, YOU WORRIED THAT YOUR FOOD WOULD RUN OUT BEFORE YOU GOT MONEY TO BUY MORE.: SOMETIMES TRUE

## 2023-11-04 SDOH — HEALTH STABILITY: MENTAL HEALTH: DELUSIONS: OTHER (COMMENT)

## 2023-11-04 SDOH — SOCIAL STABILITY: SOCIAL NETWORK: ARE YOU MARRIED, WIDOWED, DIVORCED, SEPARATED, NEVER MARRIED, OR LIVING WITH A PARTNER?: NEVER MARRIED

## 2023-11-04 SDOH — SOCIAL STABILITY: SOCIAL INSECURITY: HAS ANYONE EVER THREATENED TO HURT YOUR FAMILY OR YOUR PETS?: NO

## 2023-11-04 SDOH — SOCIAL STABILITY: SOCIAL NETWORK: IN A TYPICAL WEEK, HOW MANY TIMES DO YOU TALK ON THE PHONE WITH FAMILY, FRIENDS, OR NEIGHBORS?: THREE TIMES A WEEK

## 2023-11-04 SDOH — HEALTH STABILITY: MENTAL HEALTH
HAVE YOU STARTED TO WORK OUT OR WORKED OUT THE DETAILS OF HOW TO KILL YOURSELF? DO YOU INTENT TO CARRY OUT THIS PLAN?: NO

## 2023-11-04 SDOH — SOCIAL STABILITY: SOCIAL NETWORK
DO YOU BELONG TO ANY CLUBS OR ORGANIZATIONS SUCH AS CHURCH GROUPS UNIONS, FRATERNAL OR ATHLETIC GROUPS, OR SCHOOL GROUPS?: NO

## 2023-11-04 SDOH — HEALTH STABILITY: MENTAL HEALTH: HAVE YOU WISHED YOU WERE DEAD OR WISHED YOU COULD GO TO SLEEP AND NOT WAKE UP?: NO

## 2023-11-04 SDOH — SOCIAL STABILITY: SOCIAL NETWORK: VISITOR BEHAVIORS: UNABLE TO ASSESS

## 2023-11-04 SDOH — HEALTH STABILITY: MENTAL HEALTH
STRESS IS WHEN SOMEONE FEELS TENSE, NERVOUS, ANXIOUS, OR CAN'T SLEEP AT NIGHT BECAUSE THEIR MIND IS TROUBLED. HOW STRESSED ARE YOU?: VERY MUCH

## 2023-11-04 SDOH — HEALTH STABILITY: MENTAL HEALTH: HAVE YOU BEEN THINKING ABOUT HOW YOU MIGHT DO THIS?: NO

## 2023-11-04 SDOH — HEALTH STABILITY: PHYSICAL HEALTH: ON AVERAGE, HOW MANY DAYS PER WEEK DO YOU ENGAGE IN MODERATE TO STRENUOUS EXERCISE (LIKE A BRISK WALK)?: 0 DAYS

## 2023-11-04 SDOH — SOCIAL STABILITY: SOCIAL NETWORK: HOW OFTEN DO YOU GET TOGETHER WITH FRIENDS OR RELATIVES?: THREE TIMES A WEEK

## 2023-11-04 SDOH — HEALTH STABILITY: MENTAL HEALTH: HAVE YOU ACTUALLY HAD ANY THOUGHTS OF KILLING YOURSELF?: YES

## 2023-11-04 SDOH — SOCIAL STABILITY: SOCIAL NETWORK: EMOTIONAL SUPPORT GIVEN: REASSURE

## 2023-11-04 SDOH — HEALTH STABILITY: MENTAL HEALTH: HAVE YOU EVER DONE ANYTHING, STARTED TO DO ANYTHING, OR PREPARED TO DO ANYTHING TO END YOUR LIFE?: YES

## 2023-11-04 SDOH — HEALTH STABILITY: PHYSICAL HEALTH: ON AVERAGE, HOW MANY MINUTES DO YOU ENGAGE IN EXERCISE AT THIS LEVEL?: 0 MIN

## 2023-11-04 SDOH — HEALTH STABILITY: MENTAL HEALTH: BEHAVIORS/MOOD: CALM

## 2023-11-04 SDOH — SOCIAL STABILITY: SOCIAL NETWORK: HOW OFTEN DO YOU ATTEND CHURCH OR RELIGIOUS SERVICES?: MORE THAN 4 TIMES PER YEAR

## 2023-11-04 SDOH — SOCIAL STABILITY: SOCIAL NETWORK: HOW OFTEN DO YOU ATTENT MEETINGS OF THE CLUB OR ORGANIZATION YOU BELONG TO?: 1 TO 4 TIMES PER YEAR

## 2023-11-04 SDOH — SOCIAL STABILITY: SOCIAL INSECURITY: FAMILY BEHAVIORS: UNABLE TO ASSESS

## 2023-11-04 SDOH — ECONOMIC STABILITY: INCOME INSECURITY: IN THE LAST 12 MONTHS, WAS THERE A TIME WHEN YOU WERE NOT ABLE TO PAY THE MORTGAGE OR RENT ON TIME?: PATIENT REFUSED

## 2023-11-04 SDOH — ECONOMIC STABILITY: HOUSING INSECURITY: IN THE LAST 12 MONTHS, HOW MANY PLACES HAVE YOU LIVED?: 1

## 2023-11-04 SDOH — ECONOMIC STABILITY: FOOD INSECURITY: WITHIN THE PAST 12 MONTHS, THE FOOD YOU BOUGHT JUST DIDN'T LAST AND YOU DIDN'T HAVE MONEY TO GET MORE.: SOMETIMES TRUE

## 2023-11-04 SDOH — ECONOMIC STABILITY: TRANSPORTATION INSECURITY
IN THE PAST 12 MONTHS, HAS LACK OF TRANSPORTATION KEPT YOU FROM MEETINGS, WORK, OR FROM GETTING THINGS NEEDED FOR DAILY LIVING?: YES

## 2023-11-04 SDOH — SOCIAL STABILITY: SOCIAL INSECURITY: ARE THERE ANY APPARENT SIGNS OF INJURIES/BEHAVIORS THAT COULD BE RELATED TO ABUSE/NEGLECT?: NO

## 2023-11-04 SDOH — SOCIAL STABILITY: SOCIAL NETWORK: PARENT/GUARDIAN/SIGNIFICANT OTHER INVOLVEMENT: NO INVOLVEMENT

## 2023-11-04 SDOH — SOCIAL STABILITY: SOCIAL INSECURITY: ABUSE: ADULT

## 2023-11-04 SDOH — HEALTH STABILITY: MENTAL HEALTH: CONTENT: UNREMARKABLE

## 2023-11-04 SDOH — SOCIAL STABILITY: SOCIAL INSECURITY: HAVE YOU HAD THOUGHTS OF HARMING ANYONE ELSE?: YES

## 2023-11-04 SDOH — SOCIAL STABILITY: SOCIAL INSECURITY: DOES ANYONE TRY TO KEEP YOU FROM HAVING/CONTACTING OTHER FRIENDS OR DOING THINGS OUTSIDE YOUR HOME?: NO

## 2023-11-04 SDOH — SOCIAL STABILITY: SOCIAL INSECURITY: ARE YOU OR HAVE YOU BEEN THREATENED OR ABUSED PHYSICALLY, EMOTIONALLY, OR SEXUALLY BY ANYONE?: YES

## 2023-11-04 SDOH — HEALTH STABILITY: MENTAL HEALTH: SUICIDE ASSESSMENT: ADULT (C-SSRS)

## 2023-11-04 SDOH — SOCIAL STABILITY: SOCIAL INSECURITY: WERE YOU ABLE TO COMPLETE ALL THE BEHAVIORAL HEALTH SCREENINGS?: YES

## 2023-11-04 SDOH — HEALTH STABILITY: MENTAL HEALTH: SLEEP PATTERN: RESTLESSNESS

## 2023-11-04 SDOH — HEALTH STABILITY: MENTAL HEALTH: WAS THIS WITHIN THE PAST THREE MONTHS?: NO

## 2023-11-04 SDOH — HEALTH STABILITY: MENTAL HEALTH

## 2023-11-04 SDOH — HEALTH STABILITY: MENTAL HEALTH: HAVE YOU HAD THESE THOUGHTS AND HAD SOME INTENTION OF ACTING ON THEM?: NO

## 2023-11-04 SDOH — HEALTH STABILITY: MENTAL HEALTH: NEEDS EXPRESSED: DENIES

## 2023-11-04 SDOH — SOCIAL STABILITY: SOCIAL INSECURITY: DO YOU FEEL UNSAFE GOING BACK TO THE PLACE WHERE YOU ARE LIVING?: YES

## 2023-11-04 SDOH — ECONOMIC STABILITY: TRANSPORTATION INSECURITY
IN THE PAST 12 MONTHS, HAS THE LACK OF TRANSPORTATION KEPT YOU FROM MEDICAL APPOINTMENTS OR FROM GETTING MEDICATIONS?: YES

## 2023-11-04 ASSESSMENT — LIFESTYLE VARIABLES
HOW OFTEN DURING THE LAST YEAR HAVE YOU FOUND THAT YOU WERE NOT ABLE TO STOP DRINKING ONCE YOU HAD STARTED: NEVER
AUDIT-C TOTAL SCORE: 3
HAS A RELATIVE, FRIEND, DOCTOR, OR ANOTHER HEALTH PROFESSIONAL EXPRESSED CONCERN ABOUT YOUR DRINKING OR SUGGESTED YOU CUT DOWN: NO
SKIP TO QUESTIONS 9-10: 1
HOW OFTEN DO YOU HAVE 6 OR MORE DRINKS ON ONE OCCASION: NEVER
HOW OFTEN DO YOU HAVE A DRINK CONTAINING ALCOHOL: 2-3 TIMES A WEEK
SKIP TO QUESTIONS 9-10: 1
EVER FELT BAD OR GUILTY ABOUT YOUR DRINKING: NO
HOW OFTEN DURING THE LAST YEAR HAVE YOU BEEN UNABLE TO REMEMBER WHAT HAPPENED THE NIGHT BEFORE BECAUSE YOU HAD BEEN DRINKING: NEVER
HOW OFTEN DURING THE LAST YEAR HAVE YOU NEEDED AN ALCOHOLIC DRINK FIRST THING IN THE MORNING TO GET YOURSELF GOING AFTER A NIGHT OF HEAVY DRINKING: NEVER
HOW OFTEN DURING THE LAST YEAR HAVE YOU FAILED TO DO WHAT WAS NORMALLY EXPECTED FROM YOU BECAUSE OF DRINKING: NEVER
PRESCIPTION_ABUSE_PAST_12_MONTHS: NO
HOW OFTEN DURING THE LAST YEAR HAVE YOU HAD A FEELING OF GUILT OR REMORSE AFTER DRINKING: NEVER
AUDIT TOTAL SCORE: 0
HAVE YOU OR SOMEONE ELSE BEEN INJURED AS A RESULT OF YOUR DRINKING: NO
HAVE YOU EVER FELT YOU SHOULD CUT DOWN ON YOUR DRINKING: NO
HAVE PEOPLE ANNOYED YOU BY CRITICIZING YOUR DRINKING: NO
AUDIT TOTAL SCORE: 3
HAVE YOU OR SOMEONE ELSE BEEN INJURED AS A RESULT OF YOUR DRINKING: NO
HOW MANY STANDARD DRINKS CONTAINING ALCOHOL DO YOU HAVE ON A TYPICAL DAY: 1 OR 2
HOW OFTEN DURING THE LAST YEAR HAVE YOU NEEDED AN ALCOHOLIC DRINK FIRST THING IN THE MORNING TO GET YOURSELF GOING AFTER A NIGHT OF HEAVY DRINKING: NEVER
HOW OFTEN DURING THE LAST YEAR HAVE YOU FOUND THAT YOU WERE NOT ABLE TO STOP DRINKING ONCE YOU HAD STARTED: NEVER
REASON UNABLE TO ASSESS: NO
HAS A RELATIVE, FRIEND, DOCTOR, OR ANOTHER HEALTH PROFESSIONAL EXPRESSED CONCERN ABOUT YOUR DRINKING OR SUGGESTED YOU CUT DOWN: NO
HOW MANY STANDARD DRINKS CONTAINING ALCOHOL DO YOU HAVE ON A TYPICAL DAY: 1 OR 2
HOW OFTEN DURING THE LAST YEAR HAVE YOU HAD A FEELING OF GUILT OR REMORSE AFTER DRINKING: NEVER
HOW OFTEN DURING THE LAST YEAR HAVE YOU BEEN UNABLE TO REMEMBER WHAT HAPPENED THE NIGHT BEFORE BECAUSE YOU HAD BEEN DRINKING: NEVER
AUDIT-C TOTAL SCORE: 3
SUBSTANCE_ABUSE_PAST_12_MONTHS: YES
AUDIT-C TOTAL SCORE: 3
AUDIT TOTAL SCORE: 3
HOW OFTEN DURING THE LAST YEAR HAVE YOU FAILED TO DO WHAT WAS NORMALLY EXPECTED FROM YOU BECAUSE OF DRINKING: NEVER
EVER HAD A DRINK FIRST THING IN THE MORNING TO STEADY YOUR NERVES TO GET RID OF A HANGOVER: NO
HOW OFTEN DO YOU HAVE SIX OR MORE DRINKS ON ONE OCCASION: NEVER
HOW OFTEN DO YOU HAVE A DRINK CONTAINING ALCOHOL: 2-3 TIMES A WEEK

## 2023-11-04 ASSESSMENT — ACTIVITIES OF DAILY LIVING (ADL)
PATIENT'S MEMORY ADEQUATE TO SAFELY COMPLETE DAILY ACTIVITIES?: NO
HEARING - RIGHT EAR: FUNCTIONAL
ADEQUATE_TO_COMPLETE_ADL: YES
LACK_OF_TRANSPORTATION: YES
WALKS IN HOME: NEEDS ASSISTANCE
GROOMING: INDEPENDENT
FEEDING YOURSELF: INDEPENDENT
JUDGMENT_ADEQUATE_SAFELY_COMPLETE_DAILY_ACTIVITIES: NO
LACK_OF_TRANSPORTATION: YES
DRESSING YOURSELF: INDEPENDENT
BATHING: INDEPENDENT
TOILETING: INDEPENDENT
HEARING - LEFT EAR: FUNCTIONAL

## 2023-11-04 ASSESSMENT — COGNITIVE AND FUNCTIONAL STATUS - GENERAL
STANDING UP FROM CHAIR USING ARMS: A LITTLE
WALKING IN HOSPITAL ROOM: A LITTLE
CLIMB 3 TO 5 STEPS WITH RAILING: A LITTLE
MOVING TO AND FROM BED TO CHAIR: A LITTLE
MOBILITY SCORE: 20
PATIENT BASELINE BEDBOUND: NO
DAILY ACTIVITIY SCORE: 24

## 2023-11-04 ASSESSMENT — COLUMBIA-SUICIDE SEVERITY RATING SCALE - C-SSRS
1. IN THE PAST MONTH, HAVE YOU WISHED YOU WERE DEAD OR WISHED YOU COULD GO TO SLEEP AND NOT WAKE UP?: NO
4. HAVE YOU HAD THESE THOUGHTS AND HAD SOME INTENTION OF ACTING ON THEM?: NO
6. HAVE YOU EVER DONE ANYTHING, STARTED TO DO ANYTHING, OR PREPARED TO DO ANYTHING TO END YOUR LIFE?: NO
2. HAVE YOU ACTUALLY HAD ANY THOUGHTS OF KILLING YOURSELF?: YES
6. HAVE YOU EVER DONE ANYTHING, STARTED TO DO ANYTHING, OR PREPARED TO DO ANYTHING TO END YOUR LIFE?: NO
2. HAVE YOU ACTUALLY HAD ANY THOUGHTS OF KILLING YOURSELF?: YES
6. HAVE YOU EVER DONE ANYTHING, STARTED TO DO ANYTHING, OR PREPARED TO DO ANYTHING TO END YOUR LIFE?: CUT WRISTS
4. HAVE YOU HAD THESE THOUGHTS AND HAD SOME INTENTION OF ACTING ON THEM?: NO
6. HAVE YOU EVER DONE ANYTHING, STARTED TO DO ANYTHING, OR PREPARED TO DO ANYTHING TO END YOUR LIFE?: YES
6. HAVE YOU EVER DONE ANYTHING, STARTED TO DO ANYTHING, OR PREPARED TO DO ANYTHING TO END YOUR LIFE?: YES
5. HAVE YOU STARTED TO WORK OUT OR WORKED OUT THE DETAILS OF HOW TO KILL YOURSELF? DO YOU INTEND TO CARRY OUT THIS PLAN?: NO
1. IN THE PAST MONTH, HAVE YOU WISHED YOU WERE DEAD OR WISHED YOU COULD GO TO SLEEP AND NOT WAKE UP?: NO
5. HAVE YOU STARTED TO WORK OUT OR WORKED OUT THE DETAILS OF HOW TO KILL YOURSELF? DO YOU INTEND TO CARRY OUT THIS PLAN?: NO

## 2023-11-04 ASSESSMENT — PAIN SCALES - GENERAL
PAINLEVEL_OUTOF10: 8
PAINLEVEL_OUTOF10: 0 - NO PAIN
PAINLEVEL_OUTOF10: 0 - NO PAIN

## 2023-11-04 ASSESSMENT — PATIENT HEALTH QUESTIONNAIRE - PHQ9
SUM OF ALL RESPONSES TO PHQ9 QUESTIONS 1 & 2: 6
1. LITTLE INTEREST OR PLEASURE IN DOING THINGS: NEARLY EVERY DAY
2. FEELING DOWN, DEPRESSED OR HOPELESS: NEARLY EVERY DAY

## 2023-11-04 ASSESSMENT — PAIN - FUNCTIONAL ASSESSMENT
PAIN_FUNCTIONAL_ASSESSMENT: 0-10
PAIN_FUNCTIONAL_ASSESSMENT: 0-10

## 2023-11-04 NOTE — ED NOTES
Patient is alert and oriented x4. Calm and cooperative. Brought in by [facility] from group [place]. Patient states that he was caught doing drugs at group home and was kicked out. Patient states that he has not had his medications in 4 days. States that he takes zyprexa and 1 other medication for psychiatric health. Patient verbalizes thoughts of self harm without an active plan and has a history of cutting his wrists years ago. Patient is cooperative with staff and medications. See vitals and interventions.      Jeanna Monroy RN  11/04/23 9663

## 2023-11-04 NOTE — ED PROVIDER NOTES
"EMERGENCY DEPARTMENT ENCOUNTER      Pt Name: Nathan Vaughn  MRN: [id number]  Birthdate 1985  Date of evaluation: 11/4/2023  Provider: Placido Arroyo DO    CHIEF COMPLAINT       Chief Complaint   Patient presents with    Psychiatric Evaluation     Patient states that he got caught doing drugs at his group home and got kicked out and is having harmful thoughts without a plan         HISTORY OF PRESENT ILLNESS    38-year-old male with schizoaffective disorder, bipolar disorder comes to the emergency room.  He has been off his psychiatric medications which are Prozac and olanzapine for the last 4 days.  He is concerned because he started using drugs once again, endorses methamphetamine use last night.  He is concerned he will engage in \"suicidal activities\".  But does not endorse a specific plan.  He also says he is having \"homicidal thoughts\".  But does not elaborate while on what those thoughts may be.  He does endorse alcohol use but said he does not drink every day.  Last drink was last night.  No other medical complaints today.      History provided by:  Patient      Nursing Notes were reviewed.    PAST MEDICAL HISTORY   History reviewed. No pertinent past medical history.      SURGICAL HISTORY     History reviewed. No pertinent surgical history.      CURRENT MEDICATIONS       Previous Medications    No medications on file       ALLERGIES     Patient has no known allergies.    FAMILY HISTORY     No family history on file.       SOCIAL HISTORY       Social History     Socioeconomic History    Marital status: Single     Spouse name: None    Number of children: None    Years of education: None    Highest education level: None   Occupational History    None   Tobacco Use    Smoking status: None    Smokeless tobacco: None   Substance and Sexual Activity    Alcohol use: None    Drug use: None    Sexual activity: None   Other Topics Concern    None   Social History Narrative    None     Social Determinants of Health "     Financial Resource Strain: Not on file   Food Insecurity: Not on file   Transportation Needs: Not on file   Physical Activity: Not on file   Stress: Not on file   Social Connections: Not on file   Intimate Partner Violence: Not on file   Housing Stability: Not on file       SCREENINGS                        PHYSICAL EXAM    (up to 7 for level 4, 8 or more for level 5)     ED Triage Vitals   Temp Pulse Resp BP   -- -- -- --      SpO2 Temp src Heart Rate Source Patient Position   -- -- -- --      BP Location FiO2 (%)     -- --       Physical Exam  Vitals and nursing note reviewed.   Constitutional:       Appearance: Normal appearance.   HENT:      Head: Normocephalic and atraumatic.   Eyes:      General: No scleral icterus.        Right eye: No discharge.         Left eye: No discharge.      Conjunctiva/sclera: Conjunctivae normal.   Cardiovascular:      Rate and Rhythm: Regular rhythm. Tachycardia present.   Pulmonary:      Effort: Pulmonary effort is normal. No respiratory distress.   Abdominal:      General: There is no distension.   Musculoskeletal:      Cervical back: Normal range of motion.   Skin:     General: Skin is warm and dry.   Neurological:      Mental Status: He is alert. Mental status is at baseline.   Psychiatric:      Comments: Tangential thinking and speech.          DIAGNOSTIC RESULTS     LABS:  Labs Reviewed   CBC WITH AUTO DIFFERENTIAL - Abnormal       Result Value    WBC 9.9      nRBC 0.0      RBC 4.61      Hemoglobin 12.8 (*)     Hematocrit 38.3 (*)     MCV 83      MCH 27.8      MCHC 33.4      RDW 14.1      Platelets 412      Neutrophils % 62.6      Immature Granulocytes %, Automated 0.2      Lymphocytes % 26.4      Monocytes % 9.7      Eosinophils % 0.9      Basophils % 0.2      Neutrophils Absolute 6.16      Immature Granulocytes Absolute, Automated 0.02      Lymphocytes Absolute 2.60      Monocytes Absolute 0.96      Eosinophils Absolute 0.09      Basophils Absolute 0.02      COMPREHENSIVE METABOLIC PANEL - Abnormal    Glucose 103 (*)     Sodium 135 (*)     Potassium 4.2      Chloride 98      Bicarbonate 28      Anion Gap 13      Urea Nitrogen 19      Creatinine 1.11      eGFR 88      Calcium 9.9      Albumin 4.5      Alkaline Phosphatase 74      Total Protein 7.9      AST 82 (*)     Bilirubin, Total 1.3 (*)     ALT 65 (*)    CREATINE KINASE - Abnormal    Creatine Kinase 3,410 (*)    ACUTE TOXICOLOGY PANEL, BLOOD - Normal    Acetaminophen <10.0      Salicylate  <3      Alcohol <10     SARS-COV-2 PCR, SYMPTOMATIC - Normal    Coronavirus 2019, PCR Not Detected      Narrative:     This assay has received FDA Emergency Use Authorization (EUA) and is only authorized for the duration of time that circumstances exist to justify the authorization of the emergency use of in vitro diagnostic tests for the detection of SARS-CoV-2 virus and/or diagnosis of COVID-19 infection under section 564(b)(1) of the Act, 21 U.S.C. 360bbb-3(b)(1). This assay is an in vitro diagnostic nucleic acid amplification test for the qualitative detection of SARS-CoV-2 from nasopharyngeal specimens and has been validated for use at [facility]. Negative results do not preclude COVID-19 infections and should not be used as the sole basis for diagnosis, treatment, or other management decisions.     DRUG SCREEN,URINE       All other labs were within normal range or not returned as of this dictation.    Imaging  No orders to display        Procedures  Procedures     EMERGENCY DEPARTMENT COURSE/MDM:     Diagnoses as of 11/04/23 1708   Elevated creatine kinase   Schizoaffective disorder, bipolar type (CMS/HCC)        Medical Decision Making  38-year-old male with history of schizoaffective disorder, comes to the emergency room today.  He was caught doing meth at his group home last night.  He did admit to doing meth about 5 hours prior to presenting to the hospital as well.  Given symptomatology,  clinical story, psychiatric work-up was initiated along with CK level to assess for possible complications of meth toxicity such as rhabdomyolysis.    My independent interpretation of labs, CBC was unremarkable, chemistry did show elevation in his transaminases and bilirubin which do appear to be new.  CK level was 3410.  He was also tachycardic on initial arrival, given IV fluids for this.    Patient was not medically cleared for psychiatric evaluation and placement so was admitted to the hospital for further management.  We did administer a milligram of IV Ativan as well for possible meth toxicity.    Amount and/or Complexity of Data Reviewed  Labs: ordered. Decision-making details documented in ED Course.        Patient and or family in agreement and understanding of treatment plan.  All questions answered.      I reviewed the case with the attending ED physician. The attending ED physician agrees with the plan. Patient and/or patient´s representative was counseled regarding labs, imaging, likely diagnosis, and plan. All questions were answered.    ED Medications administered this visit:    Medications   lactated Ringer's bolus 1,000 mL (1,000 mL intravenous New Bag 11/4/23 1546)   LORazepam (Ativan) injection 1 mg (1 mg intravenous Given 11/4/23 1546)   OLANZapine zydis (ZyPREXA) disintegrating tablet 10 mg (10 mg oral Given 11/4/23 1622)   acetaminophen (Tylenol) tablet 650 mg (650 mg oral Given 11/4/23 1649)       New Prescriptions from this visit:    New Prescriptions    No medications on file       Follow-up:  No follow-up provider specified.      Final Impression:   1. Elevated creatine kinase    2. Schizoaffective disorder, bipolar type (CMS/HCC)          (Please note that portions of this note were completed with a voice recognition program.  Efforts were made to edit the dictations but occasionally words are mis-transcribed.)     Placido Arroyo DO  Resident  11/04/23 3051

## 2023-11-04 NOTE — H&P
History Of Present Illness  Nathan Vaughn is a 38 y.o. male presenting with acute psychosis. Combined reporting from ED hand-off and prior documentation. Pt has h/o schizoaffective and bipolar disorder on Prozac, Zyprexa. States he was at a group home (though per collateral reporting more of a jail house) and was kicked out around 4 days ago due to observed methamphetamine use. Was kicked out without access to his medications and has been off of them since then. He presented telling the ED he was worried about being suicidal and homicidal though did not elaborate on any plans. Also admits to some alcohol use during this time.    Chart review shows prior ED contacts for SI/HI, methamphetamine intoxication around the region.    In the ED, labs notable for mild transaminitis/hyperbilirubinemia, markedly elevated CK (~3000). Plan was for EPAT eval however with concern for rhabdomyolysis, pt to be admitted and medically treated first. Received 1L LR, tylenol, ativan, zyprexa.     Past Medical History  He has a past medical history of Aggressive behavior (07/26/2019), Cannabis use disorder (03/15/2023), Cellulitis (11/01/2021), Electrolyte disorder (07/23/2019), Homicidal thoughts (11/04/2023), Methamphetamine intoxication (CMS/[facility]) (07/24/2019), Polysubstance dependence, non-opioid, in remission (CMS/[facility]) (04/15/2022), Renal disease (07/23/2019), Stimulant use disorder (03/15/2023), Substance-induced psychotic disorder (CMS/[facility]) (03/13/2023), and Thrombocytosis (11/04/2021).    Surgical History  He has no past surgical history on file.     Social History  He has no history on file for tobacco use, alcohol use, and drug use.    Family History  No family history on file.     Allergies  Patient has no known allergies.    Review of Systems   Unable to perform ROS: Psychiatric disorder        Physical Exam  Vitals and nursing note reviewed.   Constitutional:       General: He is not in acute distress.  HENT:      Head:  "Normocephalic and atraumatic.      Right Ear: External ear normal.      Left Ear: External ear normal.      Nose: Nose normal.   Eyes:      Extraocular Movements: Extraocular movements intact.   Cardiovascular:      Rate and Rhythm: Normal rate and regular rhythm.   Pulmonary:      Effort: Pulmonary effort is normal. No respiratory distress.   Abdominal:      General: There is no distension.      Tenderness: There is no abdominal tenderness.   Musculoskeletal:         General: No signs of injury. Normal range of motion.      Cervical back: Normal range of motion.   Skin:     General: Skin is warm and dry.   Neurological:      General: No focal deficit present.      Mental Status: He is alert. Mental status is at baseline.          Last Recorded Vitals  /90   Pulse 105   Temp 36.4 °C (97.6 °F) (Temporal)   Resp 16   Wt 104 kg (230 lb)   SpO2 98%     Relevant Results    Results from last 7 days   Lab Units 11/04/23  1542   WBC AUTO x10*3/uL 9.9   RBC AUTO x10*6/uL 4.61   HEMOGLOBIN g/dL 12.8*   HEMATOCRIT % 38.3*   MCV fL 83   PLATELETS AUTO x10*3/uL 412           No lab exists for component: \"B12\"        No lab exists for component: \"SATINDEX\"  Results from last 7 days   Lab Units 11/04/23  1542   NEUTROS ABS x10*3/uL 6.16   EOS ABS AUTO x10*3/uL 0.09   LYMPHS ABS AUTO x10*3/uL 2.60         Results from last 7 days   Lab Units 11/04/23  1542   SODIUM mmol/L 135*   POTASSIUM mmol/L 4.2   CHLORIDE mmol/L 98   CO2 mmol/L 28   BUN mg/dL 19   CREATININE mg/dL 1.11   CALCIUM mg/dL 9.9   BILIRUBIN TOTAL mg/dL 1.3*   ALT U/L 65*   AST U/L 82*         Results from last 7 days   Lab Units 11/04/23  1542   CK TOTAL U/L 3,410*        Assessment/Plan   Principal Problem:    Elevated creatine kinase  Active Problems:    Chronic paranoid schizophrenia (CMS/HCC)    Polysubstance abuse (CMS/HCC)    Psychoactive substance-induced psychosis (CMS/HCC)    Schizoaffective disorder (CMS/HCC)    Rhabdomyolysis    38/M presenting " with acute psychiatric destabilization in setting of recent housing loss, substance use, and lack of access to medications. Admitted for treatment of rhabdomyolysis likely secondary to self-reported methamphetamine use, after which admission to inpatient psychiatry is anticipated.    - Trend CK bid, s/p 1L fluids in ED and will continue on mIVF. Encourage PO fluids.  - UA, UDS pending; pt reportedly refusing to provide sample. For completeness of evaluation, will obtain serum screening as add-on.  - Elopement/suicide precautions, 1:1 bedside sitter  - AM CBC/BMP/Mg  - Psychiatry recs appreciated for medication re-evaluation. Restarted previous home meds for now  - Telemetry  - Will need EPAT evaluation when medically ready. Appreciate SW/TCC input as well       DVT PPX: subcutaneous lovenox/ambulation  Nutrition: Regular    Ronal Tellez,   IM/Peds  [facility]

## 2023-11-05 LAB
ALBUMIN SERPL BCP-MCNC: 3.8 G/DL (ref 3.4–5)
ALP SERPL-CCNC: 63 U/L (ref 33–120)
ALT SERPL W P-5'-P-CCNC: 47 U/L (ref 10–52)
ANION GAP SERPL CALC-SCNC: 10 MMOL/L (ref 10–20)
AST SERPL W P-5'-P-CCNC: 50 U/L (ref 9–39)
BILIRUB SERPL-MCNC: 1.4 MG/DL (ref 0–1.2)
BUN SERPL-MCNC: 18 MG/DL (ref 6–23)
CALCIUM SERPL-MCNC: 9 MG/DL (ref 8.6–10.3)
CHLORIDE SERPL-SCNC: 101 MMOL/L (ref 98–107)
CK SERPL-CCNC: 1149 U/L (ref 0–325)
CK SERPL-CCNC: 1665 U/L (ref 0–325)
CO2 SERPL-SCNC: 29 MMOL/L (ref 21–32)
CREAT SERPL-MCNC: 1.01 MG/DL (ref 0.5–1.3)
ERYTHROCYTE [DISTWIDTH] IN BLOOD BY AUTOMATED COUNT: 14 % (ref 11.5–14.5)
GFR SERPL CREATININE-BSD FRML MDRD: >90 ML/MIN/1.73M*2
GLUCOSE SERPL-MCNC: 78 MG/DL (ref 74–99)
HCT VFR BLD AUTO: 38.5 % (ref 41–52)
HGB BLD-MCNC: 12.6 G/DL (ref 13.5–17.5)
MAGNESIUM SERPL-MCNC: 2.52 MG/DL (ref 1.6–2.4)
MCH RBC QN AUTO: 27.7 PG (ref 26–34)
MCHC RBC AUTO-ENTMCNC: 32.7 G/DL (ref 32–36)
MCV RBC AUTO: 85 FL (ref 80–100)
NRBC BLD-RTO: 0 /100 WBCS (ref 0–0)
PLATELET # BLD AUTO: 393 X10*3/UL (ref 150–450)
POTASSIUM SERPL-SCNC: 3.9 MMOL/L (ref 3.5–5.3)
PROT SERPL-MCNC: 7 G/DL (ref 6.4–8.2)
RBC # BLD AUTO: 4.55 X10*6/UL (ref 4.5–5.9)
SODIUM SERPL-SCNC: 136 MMOL/L (ref 136–145)
WBC # BLD AUTO: 5.4 X10*3/UL (ref 4.4–11.3)

## 2023-11-05 PROCEDURE — 82550 ASSAY OF CK (CPK): CPT | Performed by: STUDENT IN AN ORGANIZED HEALTH CARE EDUCATION/TRAINING PROGRAM

## 2023-11-05 PROCEDURE — 80349 CANNABINOIDS NATURAL: CPT | Performed by: STUDENT IN AN ORGANIZED HEALTH CARE EDUCATION/TRAINING PROGRAM

## 2023-11-05 PROCEDURE — 83735 ASSAY OF MAGNESIUM: CPT | Performed by: STUDENT IN AN ORGANIZED HEALTH CARE EDUCATION/TRAINING PROGRAM

## 2023-11-05 PROCEDURE — 1200000002 HC GENERAL ROOM WITH TELEMETRY DAILY

## 2023-11-05 PROCEDURE — 2500000004 HC RX 250 GENERAL PHARMACY W/ HCPCS (ALT 636 FOR OP/ED): Performed by: STUDENT IN AN ORGANIZED HEALTH CARE EDUCATION/TRAINING PROGRAM

## 2023-11-05 PROCEDURE — 85027 COMPLETE CBC AUTOMATED: CPT | Performed by: STUDENT IN AN ORGANIZED HEALTH CARE EDUCATION/TRAINING PROGRAM

## 2023-11-05 PROCEDURE — 1100000001 HC PRIVATE ROOM DAILY

## 2023-11-05 PROCEDURE — G0378 HOSPITAL OBSERVATION PER HR: HCPCS

## 2023-11-05 PROCEDURE — 36415 COLL VENOUS BLD VENIPUNCTURE: CPT | Performed by: STUDENT IN AN ORGANIZED HEALTH CARE EDUCATION/TRAINING PROGRAM

## 2023-11-05 PROCEDURE — 99232 SBSQ HOSP IP/OBS MODERATE 35: CPT | Performed by: STUDENT IN AN ORGANIZED HEALTH CARE EDUCATION/TRAINING PROGRAM

## 2023-11-05 PROCEDURE — 80053 COMPREHEN METABOLIC PANEL: CPT | Performed by: STUDENT IN AN ORGANIZED HEALTH CARE EDUCATION/TRAINING PROGRAM

## 2023-11-05 PROCEDURE — 80307 DRUG TEST PRSMV CHEM ANLYZR: CPT | Performed by: STUDENT IN AN ORGANIZED HEALTH CARE EDUCATION/TRAINING PROGRAM

## 2023-11-05 PROCEDURE — 80353 DRUG SCREENING COCAINE: CPT | Performed by: STUDENT IN AN ORGANIZED HEALTH CARE EDUCATION/TRAINING PROGRAM

## 2023-11-05 PROCEDURE — 2500000001 HC RX 250 WO HCPCS SELF ADMINISTERED DRUGS (ALT 637 FOR MEDICARE OP): Performed by: STUDENT IN AN ORGANIZED HEALTH CARE EDUCATION/TRAINING PROGRAM

## 2023-11-05 PROCEDURE — 90792 PSYCH DIAG EVAL W/MED SRVCS: CPT | Performed by: PSYCHIATRY & NEUROLOGY

## 2023-11-05 PROCEDURE — 80324 DRUG SCREEN AMPHETAMINES 1/2: CPT | Performed by: STUDENT IN AN ORGANIZED HEALTH CARE EDUCATION/TRAINING PROGRAM

## 2023-11-05 RX ADMIN — TOPIRAMATE 25 MG: 25 TABLET, FILM COATED ORAL at 21:00

## 2023-11-05 RX ADMIN — LURASIDONE HYDROCHLORIDE 40 MG: 40 TABLET, COATED ORAL at 11:44

## 2023-11-05 RX ADMIN — FLUOXETINE 40 MG: 20 CAPSULE ORAL at 08:11

## 2023-11-05 RX ADMIN — SODIUM CHLORIDE, POTASSIUM CHLORIDE, SODIUM LACTATE AND CALCIUM CHLORIDE 75 ML/HR: 600; 310; 30; 20 INJECTION, SOLUTION INTRAVENOUS at 08:11

## 2023-11-05 RX ADMIN — OLANZAPINE 15 MG: 10 TABLET, FILM COATED ORAL at 20:09

## 2023-11-05 RX ADMIN — SODIUM CHLORIDE, POTASSIUM CHLORIDE, SODIUM LACTATE AND CALCIUM CHLORIDE 75 ML/HR: 600; 310; 30; 20 INJECTION, SOLUTION INTRAVENOUS at 21:09

## 2023-11-05 ASSESSMENT — COLUMBIA-SUICIDE SEVERITY RATING SCALE - C-SSRS
2. HAVE YOU ACTUALLY HAD ANY THOUGHTS OF KILLING YOURSELF?: NO
6. HAVE YOU EVER DONE ANYTHING, STARTED TO DO ANYTHING, OR PREPARED TO DO ANYTHING TO END YOUR LIFE?: NO
1. SINCE LAST CONTACT, HAVE YOU WISHED YOU WERE DEAD OR WISHED YOU COULD GO TO SLEEP AND NOT WAKE UP?: NO
2. HAVE YOU ACTUALLY HAD ANY THOUGHTS OF KILLING YOURSELF?: NO
6. HAVE YOU EVER DONE ANYTHING, STARTED TO DO ANYTHING, OR PREPARED TO DO ANYTHING TO END YOUR LIFE?: NO
2. HAVE YOU ACTUALLY HAD ANY THOUGHTS OF KILLING YOURSELF?: NO
6. HAVE YOU EVER DONE ANYTHING, STARTED TO DO ANYTHING, OR PREPARED TO DO ANYTHING TO END YOUR LIFE?: NO
1. SINCE LAST CONTACT, HAVE YOU WISHED YOU WERE DEAD OR WISHED YOU COULD GO TO SLEEP AND NOT WAKE UP?: NO
1. SINCE LAST CONTACT, HAVE YOU WISHED YOU WERE DEAD OR WISHED YOU COULD GO TO SLEEP AND NOT WAKE UP?: NO

## 2023-11-05 ASSESSMENT — PAIN SCALES - GENERAL
PAINLEVEL_OUTOF10: 0 - NO PAIN
PAINLEVEL_OUTOF10: 0 - NO PAIN

## 2023-11-05 ASSESSMENT — PAIN - FUNCTIONAL ASSESSMENT: PAIN_FUNCTIONAL_ASSESSMENT: 0-10

## 2023-11-05 ASSESSMENT — COGNITIVE AND FUNCTIONAL STATUS - GENERAL
DAILY ACTIVITIY SCORE: 24
MOBILITY SCORE: 24

## 2023-11-05 ASSESSMENT — PAIN SCALES - WONG BAKER: WONGBAKER_NUMERICALRESPONSE: NO HURT

## 2023-11-05 NOTE — CONSULTS
Referring Provider  Dr Tellez    History Of Present Illness  Nathan Vaughn is a 38 y.o. male presenting with depression and drug relapse.  Patient with history of schizoaffective disorder and addiction recently discharged from rehab after 3-day stay there presenting to the hospital with worsening depression and suicidal thoughts.  Patient reported that after he got kicked out of the rehab center he relapsed with methamphetamine marijuana and alcohol.  Patient reported that he drank about 1-2 beers per day over the past 3 days.  Patient also admitted to using methamphetamine every day.  Patient has not been taking this medication Prozac and Zyprexa for the past 3 to 4 days.  Patient reported that since he quit taking this medication he has been feeling more depressed with hopeless and worthless feeling.  He has had passive death wishes without any plan.  He also reported having homicidal thoughts towards anybody and not having any specific plans.  Patient denies having any access to guns or any weapons.  Patient is vague when I tried to narrow or pin him down regarding the homicidal thoughts.  He has been evasive about it.  Patient however reports that he needs to get back to inpatient unit or rehabilitation program for him to stay well.    Regarding past psychiatric history patient has history of schizoaffective disorder and addiction with multiple rehab and multiple psychiatric admission.  Patient has attempted suicide 8 years ago once.    He does not have any active legal issues     Past Medical History  He has a past medical history of Aggressive behavior (07/26/2019), Cannabis use disorder (03/15/2023), Cellulitis (11/01/2021), Electrolyte disorder (07/23/2019), Homicidal thoughts (11/04/2023), Methamphetamine intoxication (CMS/[facility]) (07/24/2019), Polysubstance dependence, non-opioid, in remission (CMS/[facility]) (04/15/2022), Renal disease (07/23/2019), Stimulant use disorder (03/15/2023), Substance-induced  "psychotic disorder (CMS/[facility]) (03/13/2023), and Thrombocytosis (11/04/2021).    Surgical History  He has no past surgical history on file.     Social History  He reports that he has been smoking cigarettes. He has a 20.00 pack-year smoking history. He has never used smokeless tobacco. He reports current alcohol use. He reports current drug use. Drug: Methamphetamines.     Allergies  Patient has no known allergies.    Review of Systems    Psychiatric ROS - Adult  Anxiety: General Anxiety Disorder (TREMAINE)TREMAINE Behaviors: difficult to control worry  Depression: anhedonia, concentration, energy, helpless, hopeless, and persistent thoughts of death  Delirium: acute inattention  Psychosis: delusions  Gely: negative  Safety Issues: homicidal ideation      Physical Exam      Mental Status Exam  General: Normal build  Appearance: Good eye contact  Behavior: Normal psychomotor activity  Speech: Normal rate and rhythm coherent  Mood: Depressed and anxious  Affect: Flat  Thought Process: No formal thought disorder  Thought Content: Hopeless and helpless feeling with paranoid thoughts  Thought Perception: Denies active hallucinations  Cognition: Intact  Insight: Fair  Judgement: Fair    Psychiatric Risk Assessment  Patient considered to be of high suicide and homicide risk with high risk of relapse as well    Last Recorded Vitals  Blood pressure 127/85, pulse 96, temperature 36.2 °C (97.2 °F), temperature source Temporal, resp. rate 18, height 1.778 m (5' 10\"), weight 104 kg (230 lb), SpO2 99 %.    Relevant Results  Results for orders placed or performed during the hospital encounter of 11/04/23 (from the past 96 hour(s))   CBC and Auto Differential   Result Value Ref Range    WBC 9.9 4.4 - 11.3 x10*3/uL    nRBC 0.0 0.0 - 0.0 /100 WBCs    RBC 4.61 4.50 - 5.90 x10*6/uL    Hemoglobin 12.8 (L) 13.5 - 17.5 g/dL    Hematocrit 38.3 (L) 41.0 - 52.0 %    MCV 83 80 - 100 fL    MCH 27.8 26.0 - 34.0 pg    MCHC 33.4 32.0 - 36.0 g/dL    RDW 14.1 " 11.5 - 14.5 %    Platelets 412 150 - 450 x10*3/uL    Neutrophils % 62.6 40.0 - 80.0 %    Immature Granulocytes %, Automated 0.2 0.0 - 0.9 %    Lymphocytes % 26.4 13.0 - 44.0 %    Monocytes % 9.7 2.0 - 10.0 %    Eosinophils % 0.9 0.0 - 6.0 %    Basophils % 0.2 0.0 - 2.0 %    Neutrophils Absolute 6.16 1.20 - 7.70 x10*3/uL    Immature Granulocytes Absolute, Automated 0.02 0.00 - 0.70 x10*3/uL    Lymphocytes Absolute 2.60 1.20 - 4.80 x10*3/uL    Monocytes Absolute 0.96 0.10 - 1.00 x10*3/uL    Eosinophils Absolute 0.09 0.00 - 0.70 x10*3/uL    Basophils Absolute 0.02 0.00 - 0.10 x10*3/uL   Comprehensive Metabolic Panel   Result Value Ref Range    Glucose 103 (H) 74 - 99 mg/dL    Sodium 135 (L) 136 - 145 mmol/L    Potassium 4.2 3.5 - 5.3 mmol/L    Chloride 98 98 - 107 mmol/L    Bicarbonate 28 21 - 32 mmol/L    Anion Gap 13 10 - 20 mmol/L    Urea Nitrogen 19 6 - 23 mg/dL    Creatinine 1.11 0.50 - 1.30 mg/dL    eGFR 88 >60 mL/min/1.73m*2    Calcium 9.9 8.6 - 10.3 mg/dL    Albumin 4.5 3.4 - 5.0 g/dL    Alkaline Phosphatase 74 33 - 120 U/L    Total Protein 7.9 6.4 - 8.2 g/dL    AST 82 (H) 9 - 39 U/L    Bilirubin, Total 1.3 (H) 0.0 - 1.2 mg/dL    ALT 65 (H) 10 - 52 U/L   Acute Toxicology Panel, Blood   Result Value Ref Range    Acetaminophen <10.0 10.0 - 30.0 ug/mL    Salicylate  <3 4 - 20 mg/dL    Alcohol <10 <=10 mg/dL   Creatine Kinase   Result Value Ref Range    Creatine Kinase 3,410 (H) 0 - 325 U/L   Sars-CoV-2 PCR, Symptomatic   Result Value Ref Range    Coronavirus 2019, PCR Not Detected Not Detected   Magnesium   Result Value Ref Range    Magnesium 2.52 (H) 1.60 - 2.40 mg/dL   CBC   Result Value Ref Range    WBC 5.4 4.4 - 11.3 x10*3/uL    nRBC 0.0 0.0 - 0.0 /100 WBCs    RBC 4.55 4.50 - 5.90 x10*6/uL    Hemoglobin 12.6 (L) 13.5 - 17.5 g/dL    Hematocrit 38.5 (L) 41.0 - 52.0 %    MCV 85 80 - 100 fL    MCH 27.7 26.0 - 34.0 pg    MCHC 32.7 32.0 - 36.0 g/dL    RDW 14.0 11.5 - 14.5 %    Platelets 393 150 - 450 x10*3/uL    Comprehensive metabolic panel   Result Value Ref Range    Glucose 78 74 - 99 mg/dL    Sodium 136 136 - 145 mmol/L    Potassium 3.9 3.5 - 5.3 mmol/L    Chloride 101 98 - 107 mmol/L    Bicarbonate 29 21 - 32 mmol/L    Anion Gap 10 10 - 20 mmol/L    Urea Nitrogen 18 6 - 23 mg/dL    Creatinine 1.01 0.50 - 1.30 mg/dL    eGFR >90 >60 mL/min/1.73m*2    Calcium 9.0 8.6 - 10.3 mg/dL    Albumin 3.8 3.4 - 5.0 g/dL    Alkaline Phosphatase 63 33 - 120 U/L    Total Protein 7.0 6.4 - 8.2 g/dL    AST 50 (H) 9 - 39 U/L    Bilirubin, Total 1.4 (H) 0.0 - 1.2 mg/dL    ALT 47 10 - 52 U/L   Creatine Kinase   Result Value Ref Range    Creatine Kinase 1,665 (H) 0 - 325 U/L     No results found.           Assessment/Plan   Schizoaffective disorder bipolar type  Polydrug dependency    Patient denies currently going through any active withdrawal he is currently in the medical floor with a raised CK for which she has been cooperating and getting IV fluids treatment.  Patient will need admission to inpatient psychiatry unit when medically stable please contact EPAT for the referral  Continue Zyprexa and Prozac as ordered  Maintain sitter  Patient cannot sign AGAINST MEDICAL ADVICE           Frederick Fermin MD

## 2023-11-05 NOTE — CARE PLAN
The patient's goals for the shift include RECOVERY, PLACEMENT    The clinical goals for the shift include RETURN TO BASELINE STABLE MENTAL STATE

## 2023-11-05 NOTE — PROGRESS NOTES
HOSPITAL MEDICINE - PROGRESS NOTE    Nathan Vaughn is a 38 y.o. male on day 1 of admission presenting with Elevated creatine kinase.    Principal Problem:    Elevated creatine kinase  Active Problems:    Chronic paranoid schizophrenia (CMS/HCC)    Polysubstance abuse (CMS/HCC)    Psychoactive substance-induced psychosis (CMS/HCC)    Schizoaffective disorder (CMS/HCC)    Rhabdomyolysis      Assessment/Plan      - Trend CK bid; improving significantly since admission. s/p 1L fluids in ED and will continue on mIVF. Encourage PO fluids.  - UA, UDS pending; pt reportedly refusing to provide sample. For completeness of evaluation, will obtain serum screening as add-on.  - Elopement/suicide precautions, 1:1 bedside sitter  - AM CBC/BMP/Mg  - Psychiatry recs appreciated: EPAT evaluation when medically ready as pt will need admit to inpatient psych after this admission.  - Telemetry  - Will need EPAT evaluation when medically ready (likely tomorrow morning when CK < 1000). Appreciate SW/TCC input as well     DVT PPX: subcutaneous lovenox/ambulation  Nutrition: Regular            Subjective   Pt seen in room. No acute distress and no acute events overnight. Was very pleasant and appropriate during encounter, asked about current CK trend and whether I was participating in his dispo planning as he had recently seen psych; expressed a great deal of interest in placement.         Objective     enoxaparin, 40 mg, subcutaneous, q24h  FLUoxetine, 40 mg, oral, Daily  lurasidone, 40 mg, oral, Daily before lunch  OLANZapine, 15 mg, oral, Nightly  polyethylene glycol, 17 g, oral, Daily  topiramate, 25 mg, oral, Nightly       PRN medications: acetaminophen **OR** acetaminophen **OR** acetaminophen, acetaminophen **OR** acetaminophen **OR** acetaminophen, nicotine polacrilex, ondansetron ODT **OR** ondansetron   lactated Ringer's, 75 mL/hr, Last Rate: 75 mL/hr (11/05/23 0929)         Last Recorded Vitals  /85 (BP Location: Left  arm, Patient Position: Sitting)   Pulse 96   Temp 36.2 °C (97.2 °F) (Temporal)   Resp 18   Wt 104 kg (230 lb)   SpO2 99%   Intake/Output last 3 Shifts:    Intake/Output Summary (Last 24 hours) at 11/5/2023 1352  Last data filed at 11/5/2023 1200  Gross per 24 hour   Intake 2063.75 ml   Output --   Net 2063.75 ml       Admission Weight  Weight: 104 kg (230 lb) (11/04/23 1555)    Daily Weight  11/04/23 : 104 kg (230 lb)    Image Results  Electrocardiogram 12 Lead  Normal sinus rhythm  Rightward axis  Borderline ECG  No previous ECGs available  Confirmed by KEVIN DELA CRUZ MD ([id number]) on 8/10/2016 9:01:13 PM  Electrocardiogram 12 Lead  Sinus rhythm with marked sinus arrhythmia  Otherwise normal ECG  When compared with ECG of 03-AUG-2016 20:02,  No significant change was found  Confirmed by CNONOR EDWARDS MD ([id number]) on 8/17/2016 11:58:32 AM  Electrocardiogram 12 Lead  Normal sinus rhythm  Normal ECG  When compared with ECG of 09-AUG-2016 10:10,  T wave amplitude has increased in Inferior leads  Confirmed by CK METCALF MD [id number]) on 8/23/2016 12:56:54 PM  Electrocardiogram 12 Lead  Sinus rhythm with Premature atrial complexes  Otherwise normal ECG  Confirmed by JAYLAN VELAZCO MD [id number]) on 9/28/2016 7:22:54 AM  Electrocardiogram 12 Lead  Normal sinus rhythm with sinus arrhythmia  Normal ECG  When compared with ECG of 24-SEP-2016 23:36,  No significant change was found  Confirmed by HOMER QUIÑONES MD ([id number]) on 11/14/2016 10:53:10 AM  Electrocardiogram 12 Lead  Normal sinus rhythm  Normal ECG  Confirmed by COOPER KRISHNAMURTHY MD ([id number]) on 11/21/2016 9:27:28 PM  Electrocardiogram 12 Lead  Normal sinus rhythm  Probable early repolarization +/- pericarditis  Probable  Normal ECG  When compared with ECG of 17-NOV-2016 22:23,  STs now more elevated  Confirmed by KIKA KRISHNAMURTHY MD [id number]) on 4/7/2017 5:57:07 PM  Electrocardiogram 12 Lead  Sinus rhythm with Premature atrial complexes  ST elevation, consider early  repolarization, pericarditis, or injury  Otherwise normal ECG  Confirmed by JAYLAN VELAZCO MD [id number]) on 9/3/2017 10:21:08 AM  Electrocardiogram 12 Lead  Normal sinus rhythm with sinus arrhythmia  Normal ECG  When compared with ECG of 01-SEP-2017 02:42,  No significant change was found  Confirmed by COOPER KRISHNAMURTHY MD ([id number]) on 11/28/2017 11:45:44 AM  Electrocardiogram 12 Lead  Normal sinus rhythm  Normal ECG  When compared with ECG of 21-NOV-2017 19:16,  No significant change was found  Confirmed by VIN SNOW MD [id number]) on 12/28/2017 4:41:32 PM  Electrocardiogram 12 Lead   No QRS complexes found, no ECG analysis possible  When compared with ECG of 19-DEC-2017 03:43,  Current undetermined rhythm precludes rhythm comparison, needs review  Confirmed by KIKA KRISHNAMURTHY MD [id number]) on 5/23/2018 3:56:18 PM      Physical Exam  Vitals reviewed.   Constitutional:       General: He is not in acute distress.     Appearance: Normal appearance. He is not ill-appearing.   HENT:      Head: Normocephalic and atraumatic.      Right Ear: External ear normal.      Left Ear: External ear normal.      Nose: Nose normal.   Eyes:      Extraocular Movements: Extraocular movements intact.   Cardiovascular:      Rate and Rhythm: Normal rate and regular rhythm.   Pulmonary:      Effort: Pulmonary effort is normal. No respiratory distress.   Abdominal:      General: There is no distension.      Tenderness: There is no abdominal tenderness.   Musculoskeletal:         General: No signs of injury. Normal range of motion.      Cervical back: Normal range of motion.   Skin:     General: Skin is warm and dry.   Neurological:      General: No focal deficit present.      Mental Status: He is alert and oriented to person, place, and time. Mental status is at baseline.   Psychiatric:         Mood and Affect: Mood and affect normal.         Speech: Speech normal. Speech is not rapid and pressured or slurred.         Behavior: Behavior normal.  "Behavior is not agitated or combative. Behavior is cooperative.         Judgment: Judgment is not impulsive or inappropriate.         Relevant Results    No results found.          Results from last 7 days   Lab Units 11/05/23  0559 11/04/23  1542   WBC AUTO x10*3/uL 5.4 9.9   RBC AUTO x10*6/uL 4.55 4.61   HEMOGLOBIN g/dL 12.6* 12.8*   HEMATOCRIT % 38.5* 38.3*   MCV fL 85 83   PLATELETS AUTO x10*3/uL 393 412           No lab exists for component: \"B12\"        No lab exists for component: \"SATINDEX\"  Results from last 7 days   Lab Units 11/04/23  1542   NEUTROS ABS x10*3/uL 6.16   EOS ABS AUTO x10*3/uL 0.09   LYMPHS ABS AUTO x10*3/uL 2.60         Results from last 7 days   Lab Units 11/05/23  0559 11/04/23  1542   SODIUM mmol/L 136 135*   POTASSIUM mmol/L 3.9 4.2   CHLORIDE mmol/L 101 98   CO2 mmol/L 29 28   BUN mg/dL 18 19   CREATININE mg/dL 1.01 1.11   CALCIUM mg/dL 9.0 9.9   MAGNESIUM mg/dL 2.52*  --    BILIRUBIN TOTAL mg/dL 1.4* 1.3*   ALT U/L 47 65*   AST U/L 50* 82*         Results from last 7 days   Lab Units 11/05/23  0559 11/04/23  1542   CK TOTAL U/L 1,665* 3,410*       Ronal Tellez, DO  /[facility]        "

## 2023-11-06 PROBLEM — M62.82 RHABDOMYOLYSIS: Status: RESOLVED | Noted: 2023-10-05 | Resolved: 2023-11-06

## 2023-11-06 PROBLEM — R74.8 ELEVATED CREATINE KINASE: Status: RESOLVED | Noted: 2023-11-04 | Resolved: 2023-11-06

## 2023-11-06 LAB
ALBUMIN SERPL BCP-MCNC: 3.9 G/DL (ref 3.4–5)
ALP SERPL-CCNC: 62 U/L (ref 33–120)
ALT SERPL W P-5'-P-CCNC: 38 U/L (ref 10–52)
ANION GAP SERPL CALC-SCNC: 9 MMOL/L (ref 10–20)
AST SERPL W P-5'-P-CCNC: 30 U/L (ref 9–39)
BILIRUB SERPL-MCNC: 0.8 MG/DL (ref 0–1.2)
BUN SERPL-MCNC: 16 MG/DL (ref 6–23)
CALCIUM SERPL-MCNC: 8.9 MG/DL (ref 8.6–10.3)
CHLORIDE SERPL-SCNC: 102 MMOL/L (ref 98–107)
CK SERPL-CCNC: 530 U/L (ref 0–325)
CK SERPL-CCNC: 705 U/L (ref 0–325)
CO2 SERPL-SCNC: 28 MMOL/L (ref 21–32)
CREAT SERPL-MCNC: 0.98 MG/DL (ref 0.5–1.3)
ERYTHROCYTE [DISTWIDTH] IN BLOOD BY AUTOMATED COUNT: 13.4 % (ref 11.5–14.5)
GFR SERPL CREATININE-BSD FRML MDRD: >90 ML/MIN/1.73M*2
GLUCOSE SERPL-MCNC: 83 MG/DL (ref 74–99)
HCT VFR BLD AUTO: 38.9 % (ref 41–52)
HGB BLD-MCNC: 12.6 G/DL (ref 13.5–17.5)
MAGNESIUM SERPL-MCNC: 2.37 MG/DL (ref 1.6–2.4)
MCH RBC QN AUTO: 27.5 PG (ref 26–34)
MCHC RBC AUTO-ENTMCNC: 32.4 G/DL (ref 32–36)
MCV RBC AUTO: 85 FL (ref 80–100)
NRBC BLD-RTO: 0 /100 WBCS (ref 0–0)
PLATELET # BLD AUTO: 389 X10*3/UL (ref 150–450)
POTASSIUM SERPL-SCNC: 4 MMOL/L (ref 3.5–5.3)
PROT SERPL-MCNC: 7 G/DL (ref 6.4–8.2)
RBC # BLD AUTO: 4.58 X10*6/UL (ref 4.5–5.9)
SODIUM SERPL-SCNC: 135 MMOL/L (ref 136–145)
WBC # BLD AUTO: 4.9 X10*3/UL (ref 4.4–11.3)

## 2023-11-06 PROCEDURE — G0378 HOSPITAL OBSERVATION PER HR: HCPCS

## 2023-11-06 PROCEDURE — 99239 HOSP IP/OBS DSCHRG MGMT >30: CPT | Performed by: STUDENT IN AN ORGANIZED HEALTH CARE EDUCATION/TRAINING PROGRAM

## 2023-11-06 PROCEDURE — 36415 COLL VENOUS BLD VENIPUNCTURE: CPT | Performed by: STUDENT IN AN ORGANIZED HEALTH CARE EDUCATION/TRAINING PROGRAM

## 2023-11-06 PROCEDURE — 1200000002 HC GENERAL ROOM WITH TELEMETRY DAILY

## 2023-11-06 PROCEDURE — 82550 ASSAY OF CK (CPK): CPT | Performed by: STUDENT IN AN ORGANIZED HEALTH CARE EDUCATION/TRAINING PROGRAM

## 2023-11-06 PROCEDURE — 96372 THER/PROPH/DIAG INJ SC/IM: CPT | Performed by: STUDENT IN AN ORGANIZED HEALTH CARE EDUCATION/TRAINING PROGRAM

## 2023-11-06 PROCEDURE — 85027 COMPLETE CBC AUTOMATED: CPT | Performed by: STUDENT IN AN ORGANIZED HEALTH CARE EDUCATION/TRAINING PROGRAM

## 2023-11-06 PROCEDURE — 83735 ASSAY OF MAGNESIUM: CPT | Performed by: STUDENT IN AN ORGANIZED HEALTH CARE EDUCATION/TRAINING PROGRAM

## 2023-11-06 PROCEDURE — 2500000001 HC RX 250 WO HCPCS SELF ADMINISTERED DRUGS (ALT 637 FOR MEDICARE OP): Performed by: STUDENT IN AN ORGANIZED HEALTH CARE EDUCATION/TRAINING PROGRAM

## 2023-11-06 PROCEDURE — 1100000001 HC PRIVATE ROOM DAILY

## 2023-11-06 PROCEDURE — 80053 COMPREHEN METABOLIC PANEL: CPT | Performed by: STUDENT IN AN ORGANIZED HEALTH CARE EDUCATION/TRAINING PROGRAM

## 2023-11-06 PROCEDURE — 2500000004 HC RX 250 GENERAL PHARMACY W/ HCPCS (ALT 636 FOR OP/ED): Performed by: STUDENT IN AN ORGANIZED HEALTH CARE EDUCATION/TRAINING PROGRAM

## 2023-11-06 RX ADMIN — LURASIDONE HYDROCHLORIDE 40 MG: 40 TABLET, COATED ORAL at 11:20

## 2023-11-06 RX ADMIN — FLUOXETINE 40 MG: 20 CAPSULE ORAL at 08:40

## 2023-11-06 RX ADMIN — OLANZAPINE 15 MG: 10 TABLET, FILM COATED ORAL at 20:23

## 2023-11-06 RX ADMIN — TOPIRAMATE 25 MG: 25 TABLET, FILM COATED ORAL at 20:23

## 2023-11-06 ASSESSMENT — COLUMBIA-SUICIDE SEVERITY RATING SCALE - C-SSRS
1. SINCE LAST CONTACT, HAVE YOU WISHED YOU WERE DEAD OR WISHED YOU COULD GO TO SLEEP AND NOT WAKE UP?: NO
6. HAVE YOU EVER DONE ANYTHING, STARTED TO DO ANYTHING, OR PREPARED TO DO ANYTHING TO END YOUR LIFE?: NO
1. SINCE LAST CONTACT, HAVE YOU WISHED YOU WERE DEAD OR WISHED YOU COULD GO TO SLEEP AND NOT WAKE UP?: NO
6. HAVE YOU EVER DONE ANYTHING, STARTED TO DO ANYTHING, OR PREPARED TO DO ANYTHING TO END YOUR LIFE?: NO
2. HAVE YOU ACTUALLY HAD ANY THOUGHTS OF KILLING YOURSELF?: NO
2. HAVE YOU ACTUALLY HAD ANY THOUGHTS OF KILLING YOURSELF?: NO

## 2023-11-06 ASSESSMENT — PAIN SCALES - GENERAL
PAINLEVEL_OUTOF10: 0 - NO PAIN
PAINLEVEL_OUTOF10: 0 - NO PAIN

## 2023-11-06 NOTE — HOSPITAL COURSE
Nathan Vaughn is a 38 y.o. male presenting with acute psychosis. Combined reporting from ED hand-off and prior documentation. Pt has h/o schizoaffective and bipolar disorder on Prozac, Zyprexa. States he was at a group home (though per collateral reporting more of a skilled nursing house) and was kicked out around 4 days ago due to observed methamphetamine use. Was kicked out without access to his medications and has been off of them since then. He presented telling the ED he was worried about being suicidal and homicidal though did not elaborate on any plans. Also admits to some alcohol use during this time.     Chart review shows prior ED contacts for SI/HI, methamphetamine intoxication around the region.     In the ED, labs notable for mild transaminitis/hyperbilirubinemia, markedly elevated CK (~3000). Plan was for EPAT eval however with concern for rhabdomyolysis, pt to be admitted and medically treated first. Received 1L LR, tylenol, ativan, zyprexa.    Hospital course: Pt remained stable, cooperative, and appropriate during admission. Expressed interest in placement to myself and psychiatry, inquired about his CK trend, tolerating PO intake well. CK downtrended to 700 as of 11/6, at which time he was deemed medically ready for discharge to inpatient psychiatry. His home medications were resumed, without adjustment as this likely was precipitated by a combination of acute substance use and lack of access to home meds.

## 2023-11-06 NOTE — DISCHARGE SUMMARY
Discharge Diagnosis  Elevated creatine kinase    Issues Requiring Follow-Up  Will need to establish care with outpatient psychiatry, PCP upon discharge from inpatient psychiatry.    Discharge Meds     Your medication list        CONTINUE taking these medications        Instructions Last Dose Given Next Dose Due   cholecalciferol 1,250 mcg (50,000 unit) tablet  Commonly known as: Vitamin D3           FLUoxetine 40 mg capsule  Commonly known as: PROzac           Latuda 40 mg tablet  Generic drug: lurasidone           nicotine polacrilex 4 mg gum  Commonly known as: Nicorette           OLANZapine 15 mg tablet  Commonly known as: ZyPREXA           topiramate 25 mg tablet  Commonly known as: Topamax                    Test Results Pending At Discharge  Pending Labs       Order Current Status    Drug Screen 9 Panel, Blood with Reflex to Confirmation In process            Hospital Course  Nathan Vaughn is a 38 y.o. male presenting with acute psychosis. Combined reporting from ED hand-off and prior documentation. Pt has h/o schizoaffective and bipolar disorder on Prozac, Zyprexa. States he was at a group home (though per collateral reporting more of a snf house) and was kicked out around 4 days ago due to observed methamphetamine use. Was kicked out without access to his medications and has been off of them since then. He presented telling the ED he was worried about being suicidal and homicidal though did not elaborate on any plans. Also admits to some alcohol use during this time.     Chart review shows prior ED contacts for SI/HI, methamphetamine intoxication around the region.     In the ED, labs notable for mild transaminitis/hyperbilirubinemia, markedly elevated CK (~3000). Plan was for EPAT eval however with concern for rhabdomyolysis, pt to be admitted and medically treated first. Received 1L LR, tylenol, ativan, zyprexa.    Hospital course: Pt remained stable, cooperative, and appropriate during admission.  Expressed interest in placement to myself and psychiatry, inquired about his CK trend, tolerating PO intake well. CK downtrended to 700 as of 11/6, at which time he was deemed medically ready for discharge to inpatient psychiatry. His home medications were resumed, without adjustment as this likely was precipitated by a combination of acute substance use and lack of access to home meds.    Pertinent Physical Exam At Time of Discharge  Physical Exam  Vitals reviewed.   Constitutional:       General: He is not in acute distress.     Appearance: Normal appearance. He is not ill-appearing.   HENT:      Head: Normocephalic and atraumatic.      Right Ear: External ear normal.      Left Ear: External ear normal.      Nose: Nose normal.      Mouth/Throat:      Mouth: Mucous membranes are moist.      Pharynx: Oropharynx is clear.   Eyes:      Extraocular Movements: Extraocular movements intact.   Cardiovascular:      Rate and Rhythm: Normal rate and regular rhythm.   Pulmonary:      Effort: Pulmonary effort is normal. No respiratory distress.      Breath sounds: Normal breath sounds.   Abdominal:      General: There is no distension.      Palpations: Abdomen is soft.      Tenderness: There is no abdominal tenderness.   Musculoskeletal:         General: No signs of injury. Normal range of motion.      Cervical back: Normal range of motion.   Skin:     General: Skin is warm and dry.   Neurological:      General: No focal deficit present.      Mental Status: He is alert and oriented to person, place, and time. Mental status is at baseline.   Psychiatric:         Mood and Affect: Mood and affect normal.         Speech: Speech normal. Speech is not rapid and pressured or slurred.         Behavior: Behavior normal. Behavior is not agitated or combative. Behavior is cooperative.         Judgment: Judgment is not impulsive or inappropriate.         Outpatient Follow-Up  No future appointments.    I was the attending physician for  this patient. Coordination of care and discharge process provided by me on day of discharge was > 30 minutes.    Ronal Tellez DO  /[facility]

## 2023-11-06 NOTE — CARE PLAN
Pt remained safe throughout shift. CK is trending down since admission and has been cooperative with staff. Remains on IV fluids and possible DC once patient is placed.

## 2023-11-07 LAB
ALBUMIN SERPL BCP-MCNC: 3.8 G/DL (ref 3.4–5)
ALP SERPL-CCNC: 62 U/L (ref 33–120)
ALT SERPL W P-5'-P-CCNC: 30 U/L (ref 10–52)
ANION GAP SERPL CALC-SCNC: 10 MMOL/L (ref 10–20)
AST SERPL W P-5'-P-CCNC: 20 U/L (ref 9–39)
BILIRUB SERPL-MCNC: 0.4 MG/DL (ref 0–1.2)
BUN SERPL-MCNC: 17 MG/DL (ref 6–23)
CALCIUM SERPL-MCNC: 9.3 MG/DL (ref 8.6–10.3)
CHLORIDE SERPL-SCNC: 105 MMOL/L (ref 98–107)
CO2 SERPL-SCNC: 25 MMOL/L (ref 21–32)
CREAT SERPL-MCNC: 0.96 MG/DL (ref 0.5–1.3)
ERYTHROCYTE [DISTWIDTH] IN BLOOD BY AUTOMATED COUNT: 13.4 % (ref 11.5–14.5)
GFR SERPL CREATININE-BSD FRML MDRD: >90 ML/MIN/1.73M*2
GLUCOSE SERPL-MCNC: 80 MG/DL (ref 74–99)
HCT VFR BLD AUTO: 41.2 % (ref 41–52)
HGB BLD-MCNC: 13.2 G/DL (ref 13.5–17.5)
MAGNESIUM SERPL-MCNC: 2.27 MG/DL (ref 1.6–2.4)
MCH RBC QN AUTO: 27.6 PG (ref 26–34)
MCHC RBC AUTO-ENTMCNC: 32 G/DL (ref 32–36)
MCV RBC AUTO: 86 FL (ref 80–100)
NRBC BLD-RTO: 0 /100 WBCS (ref 0–0)
PLATELET # BLD AUTO: 435 X10*3/UL (ref 150–450)
POTASSIUM SERPL-SCNC: 4 MMOL/L (ref 3.5–5.3)
PROT SERPL-MCNC: 7.1 G/DL (ref 6.4–8.2)
RBC # BLD AUTO: 4.79 X10*6/UL (ref 4.5–5.9)
SODIUM SERPL-SCNC: 136 MMOL/L (ref 136–145)
WBC # BLD AUTO: 5.6 X10*3/UL (ref 4.4–11.3)

## 2023-11-07 PROCEDURE — 36415 COLL VENOUS BLD VENIPUNCTURE: CPT | Performed by: STUDENT IN AN ORGANIZED HEALTH CARE EDUCATION/TRAINING PROGRAM

## 2023-11-07 PROCEDURE — 99232 SBSQ HOSP IP/OBS MODERATE 35: CPT | Performed by: PSYCHIATRY & NEUROLOGY

## 2023-11-07 PROCEDURE — 85027 COMPLETE CBC AUTOMATED: CPT | Performed by: STUDENT IN AN ORGANIZED HEALTH CARE EDUCATION/TRAINING PROGRAM

## 2023-11-07 PROCEDURE — 83735 ASSAY OF MAGNESIUM: CPT | Performed by: STUDENT IN AN ORGANIZED HEALTH CARE EDUCATION/TRAINING PROGRAM

## 2023-11-07 PROCEDURE — 1200000002 HC GENERAL ROOM WITH TELEMETRY DAILY

## 2023-11-07 PROCEDURE — 99232 SBSQ HOSP IP/OBS MODERATE 35: CPT | Performed by: STUDENT IN AN ORGANIZED HEALTH CARE EDUCATION/TRAINING PROGRAM

## 2023-11-07 PROCEDURE — G0378 HOSPITAL OBSERVATION PER HR: HCPCS

## 2023-11-07 PROCEDURE — 2500000001 HC RX 250 WO HCPCS SELF ADMINISTERED DRUGS (ALT 637 FOR MEDICARE OP): Performed by: STUDENT IN AN ORGANIZED HEALTH CARE EDUCATION/TRAINING PROGRAM

## 2023-11-07 PROCEDURE — 80053 COMPREHEN METABOLIC PANEL: CPT | Performed by: STUDENT IN AN ORGANIZED HEALTH CARE EDUCATION/TRAINING PROGRAM

## 2023-11-07 PROCEDURE — 1100000001 HC PRIVATE ROOM DAILY

## 2023-11-07 PROCEDURE — 2500000004 HC RX 250 GENERAL PHARMACY W/ HCPCS (ALT 636 FOR OP/ED): Performed by: STUDENT IN AN ORGANIZED HEALTH CARE EDUCATION/TRAINING PROGRAM

## 2023-11-07 RX ADMIN — LURASIDONE HYDROCHLORIDE 40 MG: 40 TABLET, COATED ORAL at 10:30

## 2023-11-07 RX ADMIN — FLUOXETINE 40 MG: 20 CAPSULE ORAL at 10:30

## 2023-11-07 RX ADMIN — TOPIRAMATE 25 MG: 25 TABLET, FILM COATED ORAL at 22:01

## 2023-11-07 RX ADMIN — OLANZAPINE 15 MG: 10 TABLET, FILM COATED ORAL at 22:03

## 2023-11-07 RX ADMIN — POLYETHYLENE GLYCOL 3350 17 G: 17 POWDER, FOR SOLUTION ORAL at 10:31

## 2023-11-07 ASSESSMENT — COLUMBIA-SUICIDE SEVERITY RATING SCALE - C-SSRS
2. HAVE YOU ACTUALLY HAD ANY THOUGHTS OF KILLING YOURSELF?: NO
1. SINCE LAST CONTACT, HAVE YOU WISHED YOU WERE DEAD OR WISHED YOU COULD GO TO SLEEP AND NOT WAKE UP?: NO
6. HAVE YOU EVER DONE ANYTHING, STARTED TO DO ANYTHING, OR PREPARED TO DO ANYTHING TO END YOUR LIFE?: NO
6. HAVE YOU EVER DONE ANYTHING, STARTED TO DO ANYTHING, OR PREPARED TO DO ANYTHING TO END YOUR LIFE?: NO
1. SINCE LAST CONTACT, HAVE YOU WISHED YOU WERE DEAD OR WISHED YOU COULD GO TO SLEEP AND NOT WAKE UP?: NO
2. HAVE YOU ACTUALLY HAD ANY THOUGHTS OF KILLING YOURSELF?: NO

## 2023-11-07 ASSESSMENT — PAIN SCALES - WONG BAKER: WONGBAKER_NUMERICALRESPONSE: NO HURT

## 2023-11-07 ASSESSMENT — PAIN SCALES - GENERAL: PAINLEVEL_OUTOF10: 0 - NO PAIN

## 2023-11-07 NOTE — PROGRESS NOTES
Patient medically cleared for EPAT. Spoke with Tristian at [facility] who confirmed they need reconsulted if patient is admitted to the floor. Physician notified.  CANDIDO High

## 2023-11-07 NOTE — PROGRESS NOTES
HOSPITAL MEDICINE - PROGRESS NOTE    Nathan Vaughn is a 38 y.o. male on day 3 of admission presenting with Elevated creatine kinase.    Active Problems:    Chronic paranoid schizophrenia (CMS/HCC)    Polysubstance abuse (CMS/HCC)    Psychoactive substance-induced psychosis (CMS/HCC)    Schizoaffective disorder (CMS/HCC)      Assessment/Plan      - Can cease trending CK as went under 1000 on 11/6.  - UA, UDS pending; pt reportedly refusing to provide sample. Serum screen was added on  - Elopement/suicide precautions, 1:1 bedside sitter  - AM CBC/BMP/Mg  - Psychiatry recs appreciated: EPAT evaluation when medically ready as pt will need admit to inpatient psych after this admission.  - Telemetry  - Will need EPAT evaluation, pt is medically ready. Appreciate SW/TCC input as well. COVID test ordered; UDS/UA already ordered as above but pt must provide sample.     DVT PPX: subcutaneous lovenox/ambulation  Nutrition: Regular            Subjective   Pt seen in room. No acute distress and no acute events overnight.         Objective     enoxaparin, 40 mg, subcutaneous, q24h  FLUoxetine, 40 mg, oral, Daily  lurasidone, 40 mg, oral, Daily before lunch  OLANZapine, 15 mg, oral, Nightly  polyethylene glycol, 17 g, oral, Daily  topiramate, 25 mg, oral, Nightly       PRN medications: acetaminophen **OR** acetaminophen **OR** acetaminophen, acetaminophen **OR** acetaminophen **OR** acetaminophen, nicotine polacrilex, ondansetron ODT **OR** ondansetron   [Held by provider] lactated Ringer's, 75 mL/hr, Last Rate: 75 mL/hr (11/05/23 2109)         Last Recorded Vitals  BP (!) 130/91 (BP Location: Left arm, Patient Position: Lying)   Pulse 74   Temp 36 °C (96.8 °F) (Temporal)   Resp 18   Wt 104 kg (230 lb)   SpO2 96%   Intake/Output last 3 Shifts:  No intake or output data in the 24 hours ending 11/07/23 1058      Admission Weight  Weight: 104 kg (230 lb) (11/04/23 1555)    Daily Weight  11/04/23 : 104 kg (230 lb)    Image  Results  Electrocardiogram 12 Lead  Normal sinus rhythm  Rightward axis  Borderline ECG  No previous ECGs available  Confirmed by KEVIN DELA CRUZ MD [id number]) on 8/10/2016 9:01:13 PM  Electrocardiogram 12 Lead  Sinus rhythm with marked sinus arrhythmia  Otherwise normal ECG  When compared with ECG of 03-AUG-2016 20:02,  No significant change was found  Confirmed by CONNOR EDWARDS MD ([id number]) on 8/17/2016 11:58:32 AM  Electrocardiogram 12 Lead  Normal sinus rhythm  Normal ECG  When compared with ECG of 09-AUG-2016 10:10,  T wave amplitude has increased in Inferior leads  Confirmed by CK METCALF MD [id number]) on 8/23/2016 12:56:54 PM  Electrocardiogram 12 Lead  Sinus rhythm with Premature atrial complexes  Otherwise normal ECG  Confirmed by JAYLAN VELAZCO MD [id number]) on 9/28/2016 7:22:54 AM  Electrocardiogram 12 Lead  Normal sinus rhythm with sinus arrhythmia  Normal ECG  When compared with ECG of 24-SEP-2016 23:36,  No significant change was found  Confirmed by HOMER QUIÑONES MD ([id number]) on 11/14/2016 10:53:10 AM  Electrocardiogram 12 Lead  Normal sinus rhythm  Normal ECG  Confirmed by COOPER KRISHNAMURTHY MD ([id number]) on 11/21/2016 9:27:28 PM  Electrocardiogram 12 Lead  Normal sinus rhythm  Probable early repolarization +/- pericarditis  Probable  Normal ECG  When compared with ECG of 17-NOV-2016 22:23,  STs now more elevated  Confirmed by KIKA KRISHNAMURTHY MD [id number]) on 4/7/2017 5:57:07 PM  Electrocardiogram 12 Lead  Sinus rhythm with Premature atrial complexes  ST elevation, consider early repolarization, pericarditis, or injury  Otherwise normal ECG  Confirmed by JAYLAN VELAZCO MD [id number]) on 9/3/2017 10:21:08 AM  Electrocardiogram 12 Lead  Normal sinus rhythm with sinus arrhythmia  Normal ECG  When compared with ECG of 01-SEP-2017 02:42,  No significant change was found  Confirmed by COOPER KRISHNAMURTHY MD ([id number]) on 11/28/2017 11:45:44 AM  Electrocardiogram 12 Lead  Normal sinus rhythm  Normal ECG  When compared with ECG of  "21-NOV-2017 19:16,  No significant change was found  Confirmed by VIN SNOW MD ([id number]) on 12/28/2017 4:41:32 PM  Electrocardiogram 12 Lead   No QRS complexes found, no ECG analysis possible  When compared with ECG of 19-DEC-2017 03:43,  Current undetermined rhythm precludes rhythm comparison, needs review  Confirmed by KIKA KRISHNAMURTHY MD [id number]) on 5/23/2018 3:56:18 PM      Physical Exam  Vitals reviewed.   Constitutional:       General: He is not in acute distress.     Appearance: Normal appearance. He is not ill-appearing.   HENT:      Head: Normocephalic and atraumatic.      Right Ear: External ear normal.      Left Ear: External ear normal.      Nose: Nose normal.   Eyes:      Extraocular Movements: Extraocular movements intact.   Cardiovascular:      Rate and Rhythm: Normal rate and regular rhythm.   Pulmonary:      Effort: Pulmonary effort is normal. No respiratory distress.   Abdominal:      General: There is no distension.      Tenderness: There is no abdominal tenderness.   Musculoskeletal:         General: No signs of injury. Normal range of motion.      Cervical back: Normal range of motion.   Skin:     General: Skin is warm and dry.   Neurological:      General: No focal deficit present.      Mental Status: He is alert. Mental status is at baseline.   Psychiatric:         Mood and Affect: Affect normal.         Speech: Speech normal. Speech is not rapid and pressured or slurred.         Behavior: Behavior is not agitated or combative. Behavior is cooperative.         Judgment: Judgment is not impulsive or inappropriate.         Relevant Results    No results found.          Results from last 7 days   Lab Units 11/07/23  0612 11/06/23  0652 11/05/23  0559   WBC AUTO x10*3/uL 5.6 4.9 5.4   RBC AUTO x10*6/uL 4.79 4.58 4.55   HEMOGLOBIN g/dL 13.2* 12.6* 12.6*   HEMATOCRIT % 41.2 38.9* 38.5*   MCV fL 86 85 85   PLATELETS AUTO x10*3/uL 435 389 393             No lab exists for component: \"B12\"        No lab " "exists for component: \"SATINDEX\"  Results from last 7 days   Lab Units 11/04/23  1542   NEUTROS ABS x10*3/uL 6.16   EOS ABS AUTO x10*3/uL 0.09   LYMPHS ABS AUTO x10*3/uL 2.60           Results from last 7 days   Lab Units 11/07/23  0612 11/06/23  0652 11/05/23  0559   SODIUM mmol/L 136 135* 136   POTASSIUM mmol/L 4.0 4.0 3.9   CHLORIDE mmol/L 105 102 101   CO2 mmol/L 25 28 29   BUN mg/dL 17 16 18   CREATININE mg/dL 0.96 0.98 1.01   CALCIUM mg/dL 9.3 8.9 9.0   MAGNESIUM mg/dL 2.27 2.37 2.52*   BILIRUBIN TOTAL mg/dL 0.4 0.8 1.4*   ALT U/L 30 38 47   AST U/L 20 30 50*           Results from last 7 days   Lab Units 11/06/23  1640 11/06/23  0652 11/05/23  1704   CK TOTAL U/L 530* 705* 1,149*         Ronal Tellez, DO  /[facility]        "

## 2023-11-07 NOTE — PROGRESS NOTES
"Nathan Vaughn is a 38 y.o. male on day 3 of admission presenting with Elevated creatine kinase.    SUBJECTIVE:  Patient continues to report feeling depressed and suicidal.  He has hopeless and worthless feeling.  Patient has severe psychomotor agitation.  Patient has been cooperative with all the treatment and he is willing to go to inpatient psychiatric unit for further assessment and care.  Patient has auditory hallucinations which are vague in nature noncommanding.  Patient has paranoid thoughts.    Exam:  Vital Signs: BP (!) 130/91 (BP Location: Left arm, Patient Position: Lying)   Pulse 74   Temp 36 °C (96.8 °F) (Temporal)   Resp 18   Ht 1.778 m (5' 10\")   Wt 104 kg (230 lb)   SpO2 96%   BMI 33.00 kg/m²   Musculoskeletal:  Normal  Gait/Station: normal        Mental Status Exam  General: Normal build  Appearance: Good eye contact  Behavior: Normal psychomotor activity  Speech: Normal rate and rhythm coherent  Mood: Depressed and anxious  Affect: Flat  Thought Process: No formal thought disorder  Thought Content: Hopeless and helpless feeling with paranoid thoughts\  Active suicidal thoughts present  Thought Perception: Denies active visual hallucinations,  auditory hallucination present  Cognition: Intact  Insight: Fair  Judgement: Fair       Results for orders placed or performed during the hospital encounter of 11/04/23 (from the past 96 hour(s))   CBC and Auto Differential   Result Value Ref Range    WBC 9.9 4.4 - 11.3 x10*3/uL    nRBC 0.0 0.0 - 0.0 /100 WBCs    RBC 4.61 4.50 - 5.90 x10*6/uL    Hemoglobin 12.8 (L) 13.5 - 17.5 g/dL    Hematocrit 38.3 (L) 41.0 - 52.0 %    MCV 83 80 - 100 fL    MCH 27.8 26.0 - 34.0 pg    MCHC 33.4 32.0 - 36.0 g/dL    RDW 14.1 11.5 - 14.5 %    Platelets 412 150 - 450 x10*3/uL    Neutrophils % 62.6 40.0 - 80.0 %    Immature Granulocytes %, Automated 0.2 0.0 - 0.9 %    Lymphocytes % 26.4 13.0 - 44.0 %    Monocytes % 9.7 2.0 - 10.0 %    Eosinophils % 0.9 0.0 - 6.0 %    " Basophils % 0.2 0.0 - 2.0 %    Neutrophils Absolute 6.16 1.20 - 7.70 x10*3/uL    Immature Granulocytes Absolute, Automated 0.02 0.00 - 0.70 x10*3/uL    Lymphocytes Absolute 2.60 1.20 - 4.80 x10*3/uL    Monocytes Absolute 0.96 0.10 - 1.00 x10*3/uL    Eosinophils Absolute 0.09 0.00 - 0.70 x10*3/uL    Basophils Absolute 0.02 0.00 - 0.10 x10*3/uL   Comprehensive Metabolic Panel   Result Value Ref Range    Glucose 103 (H) 74 - 99 mg/dL    Sodium 135 (L) 136 - 145 mmol/L    Potassium 4.2 3.5 - 5.3 mmol/L    Chloride 98 98 - 107 mmol/L    Bicarbonate 28 21 - 32 mmol/L    Anion Gap 13 10 - 20 mmol/L    Urea Nitrogen 19 6 - 23 mg/dL    Creatinine 1.11 0.50 - 1.30 mg/dL    eGFR 88 >60 mL/min/1.73m*2    Calcium 9.9 8.6 - 10.3 mg/dL    Albumin 4.5 3.4 - 5.0 g/dL    Alkaline Phosphatase 74 33 - 120 U/L    Total Protein 7.9 6.4 - 8.2 g/dL    AST 82 (H) 9 - 39 U/L    Bilirubin, Total 1.3 (H) 0.0 - 1.2 mg/dL    ALT 65 (H) 10 - 52 U/L   Acute Toxicology Panel, Blood   Result Value Ref Range    Acetaminophen <10.0 10.0 - 30.0 ug/mL    Salicylate  <3 4 - 20 mg/dL    Alcohol <10 <=10 mg/dL   Creatine Kinase   Result Value Ref Range    Creatine Kinase 3,410 (H) 0 - 325 U/L   Sars-CoV-2 PCR, Symptomatic   Result Value Ref Range    Coronavirus 2019, PCR Not Detected Not Detected   Magnesium   Result Value Ref Range    Magnesium 2.52 (H) 1.60 - 2.40 mg/dL   CBC   Result Value Ref Range    WBC 5.4 4.4 - 11.3 x10*3/uL    nRBC 0.0 0.0 - 0.0 /100 WBCs    RBC 4.55 4.50 - 5.90 x10*6/uL    Hemoglobin 12.6 (L) 13.5 - 17.5 g/dL    Hematocrit 38.5 (L) 41.0 - 52.0 %    MCV 85 80 - 100 fL    MCH 27.7 26.0 - 34.0 pg    MCHC 32.7 32.0 - 36.0 g/dL    RDW 14.0 11.5 - 14.5 %    Platelets 393 150 - 450 x10*3/uL   Comprehensive metabolic panel   Result Value Ref Range    Glucose 78 74 - 99 mg/dL    Sodium 136 136 - 145 mmol/L    Potassium 3.9 3.5 - 5.3 mmol/L    Chloride 101 98 - 107 mmol/L    Bicarbonate 29 21 - 32 mmol/L    Anion Gap 10 10 - 20 mmol/L     Urea Nitrogen 18 6 - 23 mg/dL    Creatinine 1.01 0.50 - 1.30 mg/dL    eGFR >90 >60 mL/min/1.73m*2    Calcium 9.0 8.6 - 10.3 mg/dL    Albumin 3.8 3.4 - 5.0 g/dL    Alkaline Phosphatase 63 33 - 120 U/L    Total Protein 7.0 6.4 - 8.2 g/dL    AST 50 (H) 9 - 39 U/L    Bilirubin, Total 1.4 (H) 0.0 - 1.2 mg/dL    ALT 47 10 - 52 U/L   Creatine Kinase   Result Value Ref Range    Creatine Kinase 1,665 (H) 0 - 325 U/L   Creatine Kinase   Result Value Ref Range    Creatine Kinase 1,149 (H) 0 - 325 U/L   Magnesium   Result Value Ref Range    Magnesium 2.37 1.60 - 2.40 mg/dL   CBC   Result Value Ref Range    WBC 4.9 4.4 - 11.3 x10*3/uL    nRBC 0.0 0.0 - 0.0 /100 WBCs    RBC 4.58 4.50 - 5.90 x10*6/uL    Hemoglobin 12.6 (L) 13.5 - 17.5 g/dL    Hematocrit 38.9 (L) 41.0 - 52.0 %    MCV 85 80 - 100 fL    MCH 27.5 26.0 - 34.0 pg    MCHC 32.4 32.0 - 36.0 g/dL    RDW 13.4 11.5 - 14.5 %    Platelets 389 150 - 450 x10*3/uL   Comprehensive metabolic panel   Result Value Ref Range    Glucose 83 74 - 99 mg/dL    Sodium 135 (L) 136 - 145 mmol/L    Potassium 4.0 3.5 - 5.3 mmol/L    Chloride 102 98 - 107 mmol/L    Bicarbonate 28 21 - 32 mmol/L    Anion Gap 9 (L) 10 - 20 mmol/L    Urea Nitrogen 16 6 - 23 mg/dL    Creatinine 0.98 0.50 - 1.30 mg/dL    eGFR >90 >60 mL/min/1.73m*2    Calcium 8.9 8.6 - 10.3 mg/dL    Albumin 3.9 3.4 - 5.0 g/dL    Alkaline Phosphatase 62 33 - 120 U/L    Total Protein 7.0 6.4 - 8.2 g/dL    AST 30 9 - 39 U/L    Bilirubin, Total 0.8 0.0 - 1.2 mg/dL    ALT 38 10 - 52 U/L   Creatine Kinase   Result Value Ref Range    Creatine Kinase 705 (H) 0 - 325 U/L   Creatine Kinase   Result Value Ref Range    Creatine Kinase 530 (H) 0 - 325 U/L   Magnesium   Result Value Ref Range    Magnesium 2.27 1.60 - 2.40 mg/dL   CBC   Result Value Ref Range    WBC 5.6 4.4 - 11.3 x10*3/uL    nRBC 0.0 0.0 - 0.0 /100 WBCs    RBC 4.79 4.50 - 5.90 x10*6/uL    Hemoglobin 13.2 (L) 13.5 - 17.5 g/dL    Hematocrit 41.2 41.0 - 52.0 %    MCV 86 80 - 100 fL     MCH 27.6 26.0 - 34.0 pg    MCHC 32.0 32.0 - 36.0 g/dL    RDW 13.4 11.5 - 14.5 %    Platelets 435 150 - 450 x10*3/uL   Comprehensive metabolic panel   Result Value Ref Range    Glucose 80 74 - 99 mg/dL    Sodium 136 136 - 145 mmol/L    Potassium 4.0 3.5 - 5.3 mmol/L    Chloride 105 98 - 107 mmol/L    Bicarbonate 25 21 - 32 mmol/L    Anion Gap 10 10 - 20 mmol/L    Urea Nitrogen 17 6 - 23 mg/dL    Creatinine 0.96 0.50 - 1.30 mg/dL    eGFR >90 >60 mL/min/1.73m*2    Calcium 9.3 8.6 - 10.3 mg/dL    Albumin 3.8 3.4 - 5.0 g/dL    Alkaline Phosphatase 62 33 - 120 U/L    Total Protein 7.1 6.4 - 8.2 g/dL    AST 20 9 - 39 U/L    Bilirubin, Total 0.4 0.0 - 1.2 mg/dL    ALT 30 10 - 52 U/L         Risk Assessment:  High suicidal risk      Impression:  Schizoaffective disorder bipolar type    Recommendations:    1. Safety:  Maintain one-to-one sitter     2.  Patient will need admission to inpatient psychiatry unit when medically stable and bed available    Thank you for allowing us to participate in the care of this patient.       Frederick Fermin MD  11/7/2023  2:09 PM

## 2023-11-07 NOTE — PROGRESS NOTES
11/07/23 0909   Discharge Planning   Living Arrangements Family members   Support Systems Family members   Type of Residence [place]     Met with patient at bedside. Admitted for elevated CK. Pt has history of Schizoaffective and Biopolar disorder. Multi substance abuse. Pt was in sober living but left and relapsed. Plan is to discharge to [facility]. SS notified 11/6.

## 2023-11-07 NOTE — SIGNIFICANT EVENT
Per discharge summary from 11/7, patient is medically ready for discharge and admission to inpatient psychiatry following EPAT assessment.

## 2023-11-07 NOTE — CARE PLAN
The patient's goals for the shift include RECOVERY, PLACEMENT. Pt awaiting splacement to [facility] psych facility. Seen by psychiatry today and continues to be under suicide watch. Pt has remained calm and cooperative all shift. Took meds without difficulty. Sitter at bedside.

## 2023-11-07 NOTE — CARE PLAN
The patient's goals for the shift include RECOVERY, PLACEMENT    The clinical goals for the shift include patient will get adequate rest this shift and remain free from injury      Problem: Discharge Barriers  Goal: My discharge needs are met  Outcome: Progressing     Problem: Risk for Suicide  Goal: Accepts medications as prescribed/needed this shift  Outcome: Met     Problem: Fall/Injury  Goal: Not fall by end of shift  Outcome: Met  Goal: Be free from injury by end of the shift  Outcome: Met  Goal: Verbalize understanding of personal risk factors for fall in the hospital  Outcome: Met  Goal: Verbalize understanding of risk factor reduction measures to prevent injury from fall in the home  Outcome: Met     Problem: Daily Care  Goal: Daily care needs are met  Outcome: Met     Problem: Psychosocial Needs  Goal: Demonstrates ability to cope with hospitalization/illness  Outcome: Met  Goal: Collaborate with me, my family, and caregiver to identify my specific goals  Outcome: Met

## 2023-11-08 ENCOUNTER — APPOINTMENT (OUTPATIENT)
Dept: CARDIOLOGY | Facility: HOSPITAL | Age: 38
DRG: 558 | End: 2023-11-08
Payer: MEDICARE

## 2023-11-08 VITALS
SYSTOLIC BLOOD PRESSURE: 127 MMHG | RESPIRATION RATE: 16 BRPM | TEMPERATURE: 98.8 F | HEART RATE: 98 BPM | OXYGEN SATURATION: 98 % | DIASTOLIC BLOOD PRESSURE: 75 MMHG | HEIGHT: 70 IN | BODY MASS INDEX: 32.93 KG/M2 | WEIGHT: 230 LBS

## 2023-11-08 LAB
ALBUMIN SERPL BCP-MCNC: 4 G/DL (ref 3.4–5)
ALP SERPL-CCNC: 73 U/L (ref 33–120)
ALT SERPL W P-5'-P-CCNC: 29 U/L (ref 10–52)
AMPHETAMINES SERPL QL SCN: POSITIVE NG/ML
AMPHETAMINES UR QL SCN: ABNORMAL
ANION GAP SERPL CALC-SCNC: 10 MMOL/L (ref 10–20)
ANNOTATION COMMENT IMP: NORMAL
APPEARANCE UR: CLEAR
AST SERPL W P-5'-P-CCNC: 18 U/L (ref 9–39)
ATRIAL RATE: 119 BPM
BARBITURATES SERPL QL SCN: NEGATIVE NG/ML
BARBITURATES UR QL SCN: ABNORMAL
BENZODIAZ SERPL QL SCN: NEGATIVE NG/ML
BENZODIAZ UR QL SCN: ABNORMAL
BILIRUB SERPL-MCNC: 0.4 MG/DL (ref 0–1.2)
BILIRUB UR STRIP.AUTO-MCNC: NEGATIVE MG/DL
BUN SERPL-MCNC: 19 MG/DL (ref 6–23)
BUPRENORPHINE SERPL-MCNC: NEGATIVE NG/ML
BZE UR QL SCN: ABNORMAL
CALCIUM SERPL-MCNC: 9.3 MG/DL (ref 8.6–10.3)
CANNABINOIDS SERPL QL SCN: POSITIVE NG/ML
CANNABINOIDS UR QL SCN: ABNORMAL
CHLORIDE SERPL-SCNC: 104 MMOL/L (ref 98–107)
CO2 SERPL-SCNC: 26 MMOL/L (ref 21–32)
COCAINE SERPL QL SCN: POSITIVE NG/ML
COLOR UR: YELLOW
CREAT SERPL-MCNC: 0.95 MG/DL (ref 0.5–1.3)
ERYTHROCYTE [DISTWIDTH] IN BLOOD BY AUTOMATED COUNT: 13.4 % (ref 11.5–14.5)
FENTANYL+NORFENTANYL UR QL SCN: ABNORMAL
GFR SERPL CREATININE-BSD FRML MDRD: >90 ML/MIN/1.73M*2
GLUCOSE SERPL-MCNC: 88 MG/DL (ref 74–99)
GLUCOSE UR STRIP.AUTO-MCNC: NEGATIVE MG/DL
HCT VFR BLD AUTO: 42.5 % (ref 41–52)
HGB BLD-MCNC: 13.8 G/DL (ref 13.5–17.5)
KETONES UR STRIP.AUTO-MCNC: NEGATIVE MG/DL
LEUKOCYTE ESTERASE UR QL STRIP.AUTO: NEGATIVE
MAGNESIUM SERPL-MCNC: 2.11 MG/DL (ref 1.6–2.4)
MCH RBC QN AUTO: 27.5 PG (ref 26–34)
MCHC RBC AUTO-ENTMCNC: 32.5 G/DL (ref 32–36)
MCV RBC AUTO: 85 FL (ref 80–100)
METHADONE SERPL QL SCN: NEGATIVE NG/ML
METHAMPHET SERPL QL: POSITIVE NG/ML
NITRITE UR QL STRIP.AUTO: NEGATIVE
NRBC BLD-RTO: 0 /100 WBCS (ref 0–0)
OPIATES SERPL QL SCN: NEGATIVE NG/ML
OPIATES UR QL SCN: ABNORMAL
OXYCODONE SERPL QL: NEGATIVE NG/ML
OXYCODONE+OXYMORPHONE UR QL SCN: ABNORMAL
P AXIS: 46 DEGREES
P OFFSET: 215 MS
P ONSET: 159 MS
PCP SERPL QL SCN: NEGATIVE NG/ML
PCP UR QL SCN: ABNORMAL
PH UR STRIP.AUTO: 6 [PH]
PLATELET # BLD AUTO: 462 X10*3/UL (ref 150–450)
POTASSIUM SERPL-SCNC: 3.8 MMOL/L (ref 3.5–5.3)
PR INTERVAL: 130 MS
PROT SERPL-MCNC: 7.6 G/DL (ref 6.4–8.2)
PROT UR STRIP.AUTO-MCNC: NEGATIVE MG/DL
Q ONSET: 224 MS
QRS COUNT: 19 BEATS
QRS DURATION: 72 MS
QT INTERVAL: 342 MS
QTC CALCULATION(BAZETT): 481 MS
QTC FREDERICIA: 429 MS
R AXIS: 62 DEGREES
RBC # BLD AUTO: 5.02 X10*6/UL (ref 4.5–5.9)
RBC # UR STRIP.AUTO: NEGATIVE /UL
SARS-COV-2 RNA RESP QL NAA+PROBE: NOT DETECTED
SODIUM SERPL-SCNC: 136 MMOL/L (ref 136–145)
SP GR UR STRIP.AUTO: 1.02
T AXIS: 55 DEGREES
T OFFSET: 395 MS
UROBILINOGEN UR STRIP.AUTO-MCNC: <2 MG/DL
VENTRICULAR RATE: 119 BPM
WBC # BLD AUTO: 5.8 X10*3/UL (ref 4.4–11.3)

## 2023-11-08 PROCEDURE — G0378 HOSPITAL OBSERVATION PER HR: HCPCS

## 2023-11-08 PROCEDURE — 2500000004 HC RX 250 GENERAL PHARMACY W/ HCPCS (ALT 636 FOR OP/ED): Performed by: STUDENT IN AN ORGANIZED HEALTH CARE EDUCATION/TRAINING PROGRAM

## 2023-11-08 PROCEDURE — 2500000001 HC RX 250 WO HCPCS SELF ADMINISTERED DRUGS (ALT 637 FOR MEDICARE OP): Performed by: STUDENT IN AN ORGANIZED HEALTH CARE EDUCATION/TRAINING PROGRAM

## 2023-11-08 PROCEDURE — 99231 SBSQ HOSP IP/OBS SF/LOW 25: CPT | Performed by: STUDENT IN AN ORGANIZED HEALTH CARE EDUCATION/TRAINING PROGRAM

## 2023-11-08 PROCEDURE — 80053 COMPREHEN METABOLIC PANEL: CPT | Performed by: STUDENT IN AN ORGANIZED HEALTH CARE EDUCATION/TRAINING PROGRAM

## 2023-11-08 PROCEDURE — 36415 COLL VENOUS BLD VENIPUNCTURE: CPT | Performed by: STUDENT IN AN ORGANIZED HEALTH CARE EDUCATION/TRAINING PROGRAM

## 2023-11-08 PROCEDURE — 83735 ASSAY OF MAGNESIUM: CPT | Performed by: STUDENT IN AN ORGANIZED HEALTH CARE EDUCATION/TRAINING PROGRAM

## 2023-11-08 PROCEDURE — 85027 COMPLETE CBC AUTOMATED: CPT | Performed by: STUDENT IN AN ORGANIZED HEALTH CARE EDUCATION/TRAINING PROGRAM

## 2023-11-08 PROCEDURE — 87635 SARS-COV-2 COVID-19 AMP PRB: CPT | Performed by: STUDENT IN AN ORGANIZED HEALTH CARE EDUCATION/TRAINING PROGRAM

## 2023-11-08 PROCEDURE — 80307 DRUG TEST PRSMV CHEM ANLYZR: CPT | Performed by: STUDENT IN AN ORGANIZED HEALTH CARE EDUCATION/TRAINING PROGRAM

## 2023-11-08 PROCEDURE — 93005 ELECTROCARDIOGRAM TRACING: CPT

## 2023-11-08 PROCEDURE — 99232 SBSQ HOSP IP/OBS MODERATE 35: CPT | Performed by: PSYCHIATRY & NEUROLOGY

## 2023-11-08 PROCEDURE — 81003 URINALYSIS AUTO W/O SCOPE: CPT | Performed by: STUDENT IN AN ORGANIZED HEALTH CARE EDUCATION/TRAINING PROGRAM

## 2023-11-08 RX ADMIN — OLANZAPINE 15 MG: 10 TABLET, FILM COATED ORAL at 20:38

## 2023-11-08 RX ADMIN — FLUOXETINE 40 MG: 20 CAPSULE ORAL at 10:06

## 2023-11-08 RX ADMIN — TOPIRAMATE 25 MG: 25 TABLET, FILM COATED ORAL at 20:37

## 2023-11-08 RX ADMIN — LURASIDONE HYDROCHLORIDE 40 MG: 40 TABLET, COATED ORAL at 11:28

## 2023-11-08 ASSESSMENT — PAIN SCALES - GENERAL
PAINLEVEL_OUTOF10: 0 - NO PAIN
PAINLEVEL_OUTOF10: 0 - NO PAIN

## 2023-11-08 ASSESSMENT — COLUMBIA-SUICIDE SEVERITY RATING SCALE - C-SSRS
1. SINCE LAST CONTACT, HAVE YOU WISHED YOU WERE DEAD OR WISHED YOU COULD GO TO SLEEP AND NOT WAKE UP?: NO
6. HAVE YOU EVER DONE ANYTHING, STARTED TO DO ANYTHING, OR PREPARED TO DO ANYTHING TO END YOUR LIFE?: NO
2. HAVE YOU ACTUALLY HAD ANY THOUGHTS OF KILLING YOURSELF?: NO

## 2023-11-08 NOTE — PROGRESS NOTES
Application for Emergency Admission      Ready for Transfer?  Is the patient medically cleared for transfer to inpatient psychiatry: Yes  Has the patient been accepted to an inpatient psychiatric hospital: Yes    Application for Emergency Admission  IN ACCORDANCE WITH [place]122[place]10 [place]  The Chief Clinical Officer of: Kristal 11/8/2023 .3:59 PM    Reason for Hospitalization  The undersigned has reason to believe that: Nathan Vaughn Is a mentally ill person subject to hospitalization by court order under division B Section 5122.01 of the Revised Code, i.e., this person:    1.Yes  Represents a substantial risk of physical harm to self as manifested by evidence of threats of, or attempts at, suicide or serious self-inflicted bodily harm    2.Yes Represents a substantial risk of physical harm to others as manifested by evidence of recent homicidal or other violent behavior, evidence of recent threats that place another in reasonable fear of violent behavior and serious physical harm, or other evidence of present dangerousness    3.Yes Represents a substantial and immediate risk of serious physical impairment or injury to self as manifested by  evidence that the person is unable to provide for and is not providing for the person's basic physical needs because of the person's mental illness and that appropriate provision for those needs cannot be made  immediately available in the community    4.Yes Would benefit from treatment in a hospital for his mental illness and is in need of such treatment as manifested by evidence of behavior that creates a grave and imminent risk to substantial rights of others or  himself.    5.Yes Would benefit from treatment as manifested by evidence of behavior that indicates all of the following:       (a) The person is unlikely to survive safely in the community without supervision, based on a clinical determination.       (b) The person has a history of lack of  compliance with treatment for mental illness and one of the following applies:      (i) At least twice within the thirty-six months prior to the filing of an affidavit seeking court-ordered treatment of the person under section 5122.111 of the Revised Code, the lack of compliance has been a significant factor in necessitating hospitalization in a hospital or receipt of services in a forensic or other mental health unit of a correctional facility, provided that the thirty-six-month period shall be extended by the length of any hospitalization or incarceration of the person that occurred within the thirty-six-month period.      (ii) Within the forty-eight months prior to the filing of an affidavit seeking court-ordered treatment of the person under section 5122.111 of the Revised Code, the lack of compliance resulted in one or more acts of serious violent behavior toward self or others or threats of, or attempts at, serious physical harm to self or others, provided that the forty-eight-month period shall be extended by the length of any hospitalization or incarceration of the person that occurred within the forty-eight-month period.      (c) The person, as a result of mental illness, is unlikely to voluntarily participate in necessary treatment.       (d) In view of the person's treatment history and current behavior, the person is in need of treatment in order to prevent a relapse or deterioration that would be likely to result in substantial risk of serious harm to the person or others.    (e) Represents a substantial risk of physical harm to self or others if allowed to remain at liberty pending examination.    Therefore, it is requested that said person be admitted to the above named facility.    STATEMENT OF BELIEF    Must be filled out by one of the following: a psychiatrist, licensed physician, licensed clinical psychologist, health or ,  or .  (Statement shall include the  circumstances under which the individual was taken into custody and the reason for the person's belief that hospitalization is necessary. The statement shall also include a reference to efforts made to secure the individual's property at his residence if he was taken into custody there. Every reasonable and appropriate effort should be made to take this person into custody in the least conspicuous manner possible.)     Ronal Tellez DO 11/8/2023     _____________________________________________________________   Place of Employment: [facility]    STATEMENT OF OBSERVATION BY PSYCHIATRIST, LICENSED PHYSICIAN, OR LICENSED CLINICAL PSYCHOLOGIST, IF APPLICABLE    Place of Observation (e.g., [facility] mental health center, general hospital, office, emergency facility)  (If applicable, please complete)    Ronal Tellez DO 11/8/2023    _____________________________________________________________

## 2023-11-08 NOTE — PROGRESS NOTES
"Nathan Vaughn is a 38 y.o. male on day 4 of admission presenting with Elevated creatine kinase.    SUBJECTIVE:  No change in presentation since yesterday  Patient continues to report feeling depressed and suicidal.  He has hopeless and worthless feeling.  Patient has severe psychomotor agitation.  Patient has been cooperative with all the treatment and he is willing to go to inpatient psychiatric unit for further assessment and care.  Patient has auditory hallucinations which are vague in nature noncommanding.  Patient has paranoid thoughts.    Exam:  Vital Signs: /69 (BP Location: Left arm, Patient Position: Lying)   Pulse 74   Temp 36.2 °C (97.2 °F) (Temporal)   Resp 18   Ht 1.778 m (5' 10\")   Wt 104 kg (230 lb)   SpO2 98%   BMI 33.00 kg/m²   Musculoskeletal:  Normal  Gait/Station: normal        Mental Status Exam  General: Normal build  Appearance: Good eye contact  Behavior: Normal psychomotor activity  Speech: Normal rate and rhythm coherent  Mood: Depressed and anxious  Affect: Flat  Thought Process: No formal thought disorder  Thought Content: Hopeless and helpless feeling with paranoid thoughts\  Active suicidal thoughts present  Thought Perception: Denies active visual hallucinations,  auditory hallucination present  Cognition: Intact  Insight: Fair  Judgement: Fair       Results for orders placed or performed during the hospital encounter of 11/04/23 (from the past 96 hour(s))   CBC and Auto Differential   Result Value Ref Range    WBC 9.9 4.4 - 11.3 x10*3/uL    nRBC 0.0 0.0 - 0.0 /100 WBCs    RBC 4.61 4.50 - 5.90 x10*6/uL    Hemoglobin 12.8 (L) 13.5 - 17.5 g/dL    Hematocrit 38.3 (L) 41.0 - 52.0 %    MCV 83 80 - 100 fL    MCH 27.8 26.0 - 34.0 pg    MCHC 33.4 32.0 - 36.0 g/dL    RDW 14.1 11.5 - 14.5 %    Platelets 412 150 - 450 x10*3/uL    Neutrophils % 62.6 40.0 - 80.0 %    Immature Granulocytes %, Automated 0.2 0.0 - 0.9 %    Lymphocytes % 26.4 13.0 - 44.0 %    Monocytes % 9.7 2.0 - 10.0 % "    Eosinophils % 0.9 0.0 - 6.0 %    Basophils % 0.2 0.0 - 2.0 %    Neutrophils Absolute 6.16 1.20 - 7.70 x10*3/uL    Immature Granulocytes Absolute, Automated 0.02 0.00 - 0.70 x10*3/uL    Lymphocytes Absolute 2.60 1.20 - 4.80 x10*3/uL    Monocytes Absolute 0.96 0.10 - 1.00 x10*3/uL    Eosinophils Absolute 0.09 0.00 - 0.70 x10*3/uL    Basophils Absolute 0.02 0.00 - 0.10 x10*3/uL   Comprehensive Metabolic Panel   Result Value Ref Range    Glucose 103 (H) 74 - 99 mg/dL    Sodium 135 (L) 136 - 145 mmol/L    Potassium 4.2 3.5 - 5.3 mmol/L    Chloride 98 98 - 107 mmol/L    Bicarbonate 28 21 - 32 mmol/L    Anion Gap 13 10 - 20 mmol/L    Urea Nitrogen 19 6 - 23 mg/dL    Creatinine 1.11 0.50 - 1.30 mg/dL    eGFR 88 >60 mL/min/1.73m*2    Calcium 9.9 8.6 - 10.3 mg/dL    Albumin 4.5 3.4 - 5.0 g/dL    Alkaline Phosphatase 74 33 - 120 U/L    Total Protein 7.9 6.4 - 8.2 g/dL    AST 82 (H) 9 - 39 U/L    Bilirubin, Total 1.3 (H) 0.0 - 1.2 mg/dL    ALT 65 (H) 10 - 52 U/L   Acute Toxicology Panel, Blood   Result Value Ref Range    Acetaminophen <10.0 10.0 - 30.0 ug/mL    Salicylate  <3 4 - 20 mg/dL    Alcohol <10 <=10 mg/dL   Creatine Kinase   Result Value Ref Range    Creatine Kinase 3,410 (H) 0 - 325 U/L   Sars-CoV-2 PCR, Symptomatic   Result Value Ref Range    Coronavirus 2019, PCR Not Detected Not Detected   Drug Screen 9 Panel, Blood with Reflex to Confirmation   Result Value Ref Range    Amphetamine Screen, S/P Positive Cutoff 20 ng/mL    Methamphetamine Screen, S/P Positive Cutoff 20 ng/mL    Benzodiazepine Screen, S/P Negative Cutoff 50 ng/mL    Buprenorphine Screen, S/P Negative Cutoff 1 ng/mL    Cannabinoids Screen, S/P Positive Cutoff 20 ng/mL    Cocaine Screen Screen, S/P Positive Cutoff 20 ng/mL    Methadone Screen, S/P Negative Cutoff 25 ng/mL    Oxycodone Screen, S/P Negative Cutoff 20 ng/mL    Phencyclidine Screen, S/P Negative Cutoff 10 ng/mL    Opiate Screen, S/P Negative Cutoff 20 ng/mL    Drug Screen Comment S/P  See Note     Barbiturate Screen, S/P Negative Cutoff 50 ng/mL   Magnesium   Result Value Ref Range    Magnesium 2.52 (H) 1.60 - 2.40 mg/dL   CBC   Result Value Ref Range    WBC 5.4 4.4 - 11.3 x10*3/uL    nRBC 0.0 0.0 - 0.0 /100 WBCs    RBC 4.55 4.50 - 5.90 x10*6/uL    Hemoglobin 12.6 (L) 13.5 - 17.5 g/dL    Hematocrit 38.5 (L) 41.0 - 52.0 %    MCV 85 80 - 100 fL    MCH 27.7 26.0 - 34.0 pg    MCHC 32.7 32.0 - 36.0 g/dL    RDW 14.0 11.5 - 14.5 %    Platelets 393 150 - 450 x10*3/uL   Comprehensive metabolic panel   Result Value Ref Range    Glucose 78 74 - 99 mg/dL    Sodium 136 136 - 145 mmol/L    Potassium 3.9 3.5 - 5.3 mmol/L    Chloride 101 98 - 107 mmol/L    Bicarbonate 29 21 - 32 mmol/L    Anion Gap 10 10 - 20 mmol/L    Urea Nitrogen 18 6 - 23 mg/dL    Creatinine 1.01 0.50 - 1.30 mg/dL    eGFR >90 >60 mL/min/1.73m*2    Calcium 9.0 8.6 - 10.3 mg/dL    Albumin 3.8 3.4 - 5.0 g/dL    Alkaline Phosphatase 63 33 - 120 U/L    Total Protein 7.0 6.4 - 8.2 g/dL    AST 50 (H) 9 - 39 U/L    Bilirubin, Total 1.4 (H) 0.0 - 1.2 mg/dL    ALT 47 10 - 52 U/L   Creatine Kinase   Result Value Ref Range    Creatine Kinase 1,665 (H) 0 - 325 U/L   Creatine Kinase   Result Value Ref Range    Creatine Kinase 1,149 (H) 0 - 325 U/L   Magnesium   Result Value Ref Range    Magnesium 2.37 1.60 - 2.40 mg/dL   CBC   Result Value Ref Range    WBC 4.9 4.4 - 11.3 x10*3/uL    nRBC 0.0 0.0 - 0.0 /100 WBCs    RBC 4.58 4.50 - 5.90 x10*6/uL    Hemoglobin 12.6 (L) 13.5 - 17.5 g/dL    Hematocrit 38.9 (L) 41.0 - 52.0 %    MCV 85 80 - 100 fL    MCH 27.5 26.0 - 34.0 pg    MCHC 32.4 32.0 - 36.0 g/dL    RDW 13.4 11.5 - 14.5 %    Platelets 389 150 - 450 x10*3/uL   Comprehensive metabolic panel   Result Value Ref Range    Glucose 83 74 - 99 mg/dL    Sodium 135 (L) 136 - 145 mmol/L    Potassium 4.0 3.5 - 5.3 mmol/L    Chloride 102 98 - 107 mmol/L    Bicarbonate 28 21 - 32 mmol/L    Anion Gap 9 (L) 10 - 20 mmol/L    Urea Nitrogen 16 6 - 23 mg/dL    Creatinine  0.98 0.50 - 1.30 mg/dL    eGFR >90 >60 mL/min/1.73m*2    Calcium 8.9 8.6 - 10.3 mg/dL    Albumin 3.9 3.4 - 5.0 g/dL    Alkaline Phosphatase 62 33 - 120 U/L    Total Protein 7.0 6.4 - 8.2 g/dL    AST 30 9 - 39 U/L    Bilirubin, Total 0.8 0.0 - 1.2 mg/dL    ALT 38 10 - 52 U/L   Creatine Kinase   Result Value Ref Range    Creatine Kinase 705 (H) 0 - 325 U/L   Creatine Kinase   Result Value Ref Range    Creatine Kinase 530 (H) 0 - 325 U/L   Magnesium   Result Value Ref Range    Magnesium 2.27 1.60 - 2.40 mg/dL   CBC   Result Value Ref Range    WBC 5.6 4.4 - 11.3 x10*3/uL    nRBC 0.0 0.0 - 0.0 /100 WBCs    RBC 4.79 4.50 - 5.90 x10*6/uL    Hemoglobin 13.2 (L) 13.5 - 17.5 g/dL    Hematocrit 41.2 41.0 - 52.0 %    MCV 86 80 - 100 fL    MCH 27.6 26.0 - 34.0 pg    MCHC 32.0 32.0 - 36.0 g/dL    RDW 13.4 11.5 - 14.5 %    Platelets 435 150 - 450 x10*3/uL   Comprehensive metabolic panel   Result Value Ref Range    Glucose 80 74 - 99 mg/dL    Sodium 136 136 - 145 mmol/L    Potassium 4.0 3.5 - 5.3 mmol/L    Chloride 105 98 - 107 mmol/L    Bicarbonate 25 21 - 32 mmol/L    Anion Gap 10 10 - 20 mmol/L    Urea Nitrogen 17 6 - 23 mg/dL    Creatinine 0.96 0.50 - 1.30 mg/dL    eGFR >90 >60 mL/min/1.73m*2    Calcium 9.3 8.6 - 10.3 mg/dL    Albumin 3.8 3.4 - 5.0 g/dL    Alkaline Phosphatase 62 33 - 120 U/L    Total Protein 7.1 6.4 - 8.2 g/dL    AST 20 9 - 39 U/L    Bilirubin, Total 0.4 0.0 - 1.2 mg/dL    ALT 30 10 - 52 U/L   Sars-CoV-2 PCR, Screen Asymptomatic   Result Value Ref Range    Coronavirus 2019, PCR Not Detected Not Detected   Magnesium   Result Value Ref Range    Magnesium 2.11 1.60 - 2.40 mg/dL   CBC   Result Value Ref Range    WBC 5.8 4.4 - 11.3 x10*3/uL    nRBC 0.0 0.0 - 0.0 /100 WBCs    RBC 5.02 4.50 - 5.90 x10*6/uL    Hemoglobin 13.8 13.5 - 17.5 g/dL    Hematocrit 42.5 41.0 - 52.0 %    MCV 85 80 - 100 fL    MCH 27.5 26.0 - 34.0 pg    MCHC 32.5 32.0 - 36.0 g/dL    RDW 13.4 11.5 - 14.5 %    Platelets 462 (H) 150 - 450 x10*3/uL    Comprehensive metabolic panel   Result Value Ref Range    Glucose 88 74 - 99 mg/dL    Sodium 136 136 - 145 mmol/L    Potassium 3.8 3.5 - 5.3 mmol/L    Chloride 104 98 - 107 mmol/L    Bicarbonate 26 21 - 32 mmol/L    Anion Gap 10 10 - 20 mmol/L    Urea Nitrogen 19 6 - 23 mg/dL    Creatinine 0.95 0.50 - 1.30 mg/dL    eGFR >90 >60 mL/min/1.73m*2    Calcium 9.3 8.6 - 10.3 mg/dL    Albumin 4.0 3.4 - 5.0 g/dL    Alkaline Phosphatase 73 33 - 120 U/L    Total Protein 7.6 6.4 - 8.2 g/dL    AST 18 9 - 39 U/L    Bilirubin, Total 0.4 0.0 - 1.2 mg/dL    ALT 29 10 - 52 U/L   Drug Screen, Urine   Result Value Ref Range    Amphetamine Screen, Urine Presumptive Positive (A) Presumptive Negative    Barbiturate Screen, Urine Presumptive Negative Presumptive Negative    Benzodiazepines Screen, Urine Presumptive Negative Presumptive Negative    Cannabinoid Screen, Urine Presumptive Positive (A) Presumptive Negative    Cocaine Metabolite Screen, Urine Presumptive Negative Presumptive Negative    Fentanyl Screen, Urine Presumptive Negative Presumptive Negative    Opiate Screen, Urine Presumptive Negative Presumptive Negative    Oxycodone Screen, Urine Presumptive Negative Presumptive Negative    PCP Screen, Urine Presumptive Negative Presumptive Negative   Urinalysis with Reflex Microscopic   Result Value Ref Range    Color, Urine Yellow Straw, Yellow    Appearance, Urine Clear Clear    Specific Gravity, Urine 1.024 1.005 - 1.035    pH, Urine 6.0 5.0, 5.5, 6.0, 6.5, 7.0, 7.5, 8.0    Protein, Urine NEGATIVE NEGATIVE mg/dL    Glucose, Urine NEGATIVE NEGATIVE mg/dL    Blood, Urine NEGATIVE NEGATIVE    Ketones, Urine NEGATIVE NEGATIVE mg/dL    Bilirubin, Urine NEGATIVE NEGATIVE    Urobilinogen, Urine <2.0 <2.0 mg/dL    Nitrite, Urine NEGATIVE NEGATIVE    Leukocyte Esterase, Urine NEGATIVE NEGATIVE         Risk Assessment:  High suicidal risk      Impression:  Schizoaffective disorder bipolar type    Recommendations:    1. Safety:  Maintain  one-to-one sitter     2.  Patient will need admission to inpatient psychiatry unit when medically stable and bed available    Thank you for allowing us to participate in the care of this patient.       Frederick Ferimn MD  11/8/2023  2:05 PM

## 2023-11-08 NOTE — CARE PLAN
Problem: Fall/Injury  Goal: Use assistive devices by end of the shift  Outcome: Progressing  Goal: Pace activities to prevent fatigue by end of the shift  Outcome: Progressing     Problem: Discharge Barriers  Goal: My discharge needs are met  Outcome: Progressing   The patient's goals for the shift include RECOVERY, PLACEMENT    The clinical goals for the shift include patient will get adequate rest this shift and remain free from injury

## 2023-11-08 NOTE — CARE PLAN
The patient's goals for the shift include RECOVERY, PLACEMENT    The clinical goals for the shift include pt will remain safe thru shift    Over the shift, the patient did  make progress toward the following goals.remained safe thru this shift. To transfer to other faciltiy today.

## 2023-11-08 NOTE — NURSING NOTE
Dr. Fermin in to see pt. Urine sent for pt. [facility]. Now searching for facility to place pt.

## 2023-11-08 NOTE — PROGRESS NOTES
HOSPITAL MEDICINE - PROGRESS NOTE    Nathan Vaughn is a 38 y.o. male on day 4 of admission presenting with Elevated creatine kinase.    Active Problems:    Chronic paranoid schizophrenia (CMS/HCC)    Polysubstance abuse (CMS/HCC)    Psychoactive substance-induced psychosis (CMS/HCC)    Schizoaffective disorder (CMS/HCC)      Assessment/Plan      - Can cease trending CK as went under 1000 on 11/6.  - UA/UDS completed on 11/8. Presumptive positive for amphetamine, cannabinoid; serum panel had been added on after admission as no urine sample had been available which is prelim positive for amphetamine, cannabinoid, and cocaine.  - Elopement/suicide precautions, 1:1 bedside sitter  - AM CBC/BMP/Mg  - Psychiatry recs appreciated: EPAT evaluation when medically ready as pt will need admit to inpatient psych after this admission.  - Telemetry  - Will need EPAT evaluation, pt is medically ready. Appreciate SW/TCC input as well. COVID negative; UDS/UA already done as above     DVT PPX: subcutaneous lovenox/ambulation  Nutrition: Regular            Subjective   Pt seen in room. No acute distress and no acute events overnight.         Objective     enoxaparin, 40 mg, subcutaneous, q24h  FLUoxetine, 40 mg, oral, Daily  lurasidone, 40 mg, oral, Daily before lunch  OLANZapine, 15 mg, oral, Nightly  polyethylene glycol, 17 g, oral, Daily  topiramate, 25 mg, oral, Nightly       PRN medications: acetaminophen **OR** acetaminophen **OR** acetaminophen, acetaminophen **OR** acetaminophen **OR** acetaminophen, nicotine polacrilex, ondansetron ODT **OR** ondansetron   [Held by provider] lactated Ringer's, 75 mL/hr, Last Rate: 75 mL/hr (11/05/23 2109)         Last Recorded Vitals  /69 (BP Location: Left arm, Patient Position: Lying)   Pulse 74   Temp 36.2 °C (97.2 °F) (Temporal)   Resp 18   Wt 104 kg (230 lb)   SpO2 98%   Intake/Output last 3 Shifts:    Intake/Output Summary (Last 24 hours) at 11/8/2023 6054  Last data filed  at 11/8/2023 1213  Gross per 24 hour   Intake 450 ml   Output 500 ml   Net -50 ml         Admission Weight  Weight: 104 kg (230 lb) (11/04/23 1555)    Daily Weight  11/04/23 : 104 kg (230 lb)    Image Results  Electrocardiogram 12 Lead  Normal sinus rhythm  Rightward axis  Borderline ECG  No previous ECGs available  Confirmed by KEVIN DELA CRUZ MD [id number]) on 8/10/2016 9:01:13 PM  Electrocardiogram 12 Lead  Sinus rhythm with marked sinus arrhythmia  Otherwise normal ECG  When compared with ECG of 03-AUG-2016 20:02,  No significant change was found  Confirmed by CONNOR EDWARDS MD ([id number]) on 8/17/2016 11:58:32 AM  Electrocardiogram 12 Lead  Normal sinus rhythm  Normal ECG  When compared with ECG of 09-AUG-2016 10:10,  T wave amplitude has increased in Inferior leads  Confirmed by CK METCALF MD [id number]) on 8/23/2016 12:56:54 PM  Electrocardiogram 12 Lead  Sinus rhythm with Premature atrial complexes  Otherwise normal ECG  Confirmed by JAYLAN VELAZCO MD ([id number]) on 9/28/2016 7:22:54 AM  Electrocardiogram 12 Lead  Normal sinus rhythm with sinus arrhythmia  Normal ECG  When compared with ECG of 24-SEP-2016 23:36,  No significant change was found  Confirmed by HOMER QUIÑONES MD ([id number]) on 11/14/2016 10:53:10 AM  Electrocardiogram 12 Lead  Normal sinus rhythm  Normal ECG  Confirmed by COOPER KRISHNAMURTHY MD ([id number]) on 11/21/2016 9:27:28 PM  Electrocardiogram 12 Lead  Normal sinus rhythm  Probable early repolarization +/- pericarditis  Probable  Normal ECG  When compared with ECG of 17-NOV-2016 22:23,  STs now more elevated  Confirmed by KIKA KRISHNAMURTHY MD ([id number]) on 4/7/2017 5:57:07 PM  Electrocardiogram 12 Lead  Sinus rhythm with Premature atrial complexes  ST elevation, consider early repolarization, pericarditis, or injury  Otherwise normal ECG  Confirmed by JAYLAN VELAZCO MD ([id number]) on 9/3/2017 10:21:08 AM  Electrocardiogram 12 Lead  Normal sinus rhythm with sinus arrhythmia  Normal ECG  When compared with ECG of  "01-SEP-2017 02:42,  No significant change was found  Confirmed by COOPER KRISHNAMURTHY MD ([id number]) on 11/28/2017 11:45:44 AM  Electrocardiogram 12 Lead  Normal sinus rhythm  Normal ECG  When compared with ECG of 21-NOV-2017 19:16,  No significant change was found  Confirmed by VIN SNOW MD ([id number]) on 12/28/2017 4:41:32 PM  Electrocardiogram 12 Lead   No QRS complexes found, no ECG analysis possible  When compared with ECG of 19-DEC-2017 03:43,  Current undetermined rhythm precludes rhythm comparison, needs review  Confirmed by KIKA KRISHNAMURTHY MD ([id number]) on 5/23/2018 3:56:18 PM      Physical Exam  Vitals and nursing note reviewed.   Constitutional:       General: He is not in acute distress.     Appearance: Normal appearance. He is not ill-appearing.   HENT:      Head: Normocephalic and atraumatic.      Right Ear: External ear normal.      Left Ear: External ear normal.      Nose: Nose normal.   Eyes:      Extraocular Movements: Extraocular movements intact.   Cardiovascular:      Rate and Rhythm: Normal rate and regular rhythm.   Pulmonary:      Effort: Pulmonary effort is normal. No respiratory distress.   Abdominal:      General: There is no distension.      Tenderness: There is no abdominal tenderness.   Musculoskeletal:         General: No signs of injury.      Cervical back: Normal range of motion.   Neurological:      General: No focal deficit present.      Mental Status: He is alert. Mental status is at baseline.         Relevant Results    No results found.          Results from last 7 days   Lab Units 11/08/23  0539 11/07/23  0612 11/06/23  0652   WBC AUTO x10*3/uL 5.8 5.6 4.9   RBC AUTO x10*6/uL 5.02 4.79 4.58   HEMOGLOBIN g/dL 13.8 13.2* 12.6*   HEMATOCRIT % 42.5 41.2 38.9*   MCV fL 85 86 85   PLATELETS AUTO x10*3/uL 462* 435 389             No lab exists for component: \"B12\"        No lab exists for component: \"SATINDEX\"  Results from last 7 days   Lab Units 11/04/23  1542   NEUTROS ABS x10*3/uL 6.16   EOS ABS " AUTO x10*3/uL 0.09   LYMPHS ABS AUTO x10*3/uL 2.60           Results from last 7 days   Lab Units 11/08/23  0539 11/07/23  0612 11/06/23  0652   SODIUM mmol/L 136 136 135*   POTASSIUM mmol/L 3.8 4.0 4.0   CHLORIDE mmol/L 104 105 102   CO2 mmol/L 26 25 28   BUN mg/dL 19 17 16   CREATININE mg/dL 0.95 0.96 0.98   CALCIUM mg/dL 9.3 9.3 8.9   MAGNESIUM mg/dL 2.11 2.27 2.37   BILIRUBIN TOTAL mg/dL 0.4 0.4 0.8   ALT U/L 29 30 38   AST U/L 18 20 30       Results from last 7 days   Lab Units 11/08/23  1209   COLOR U  Yellow   APPEARANCE U  Clear   PH U  6.0   SPEC GRAV UR  1.024   PROTEIN U mg/dL NEGATIVE   BLOOD UR  NEGATIVE   NITRITE U  NEGATIVE     Results from last 7 days   Lab Units 11/06/23  1640 11/06/23  0652 11/05/23  1704   CK TOTAL U/L 530* 705* 1,149*       Pain Management Panel  More data exists         Latest Ref Rng & Units 11/8/2023 9/11/2023   Pain Management Panel   Amphetamine Screen, Urine Presumptive Negative Presumptive Positive  PRESUMPTIVE POSITIVE    Barbiturate Screen, Urine Presumptive Negative Presumptive Negative  PRESUMPTIVE NEGATIVE    Benzodiazepines Screen, Urine Presumptive Negative Presumptive Negative  -   Fentanyl Screen, Urine Presumptive Negative Presumptive Negative  PRESUMPTIVE POSITIVE      Component  Ref Range & Units    Coronavirus 2019, PCR  Not Detected Not Detected   Resulting Agency EZEQUIEL Tellez DO  /[facility]

## 2023-11-10 LAB
BZE SERPL-MCNC: <20 NG/ML
CANNABINOIDS SERPL-MCNC: 8 NG/ML

## 2023-11-11 LAB
AMPHET SERPL-MCNC: 24 NG/ML
MDA SERPL-MCNC: <20 NG/ML
MDEA SERPL-MCNC: <20 NG/ML
MDMA SERPL-MCNC: <20 NG/ML
METHAMPHET SERPL-MCNC: 340 NG/ML

## 2023-12-22 ENCOUNTER — HOSPITAL ENCOUNTER (EMERGENCY)
Facility: HOSPITAL | Age: 38
Discharge: HOME | End: 2023-12-23
Attending: EMERGENCY MEDICINE
Payer: MEDICARE

## 2023-12-22 ENCOUNTER — ANCILLARY PROCEDURE (OUTPATIENT)
Dept: EMERGENCY MEDICINE | Facility: HOSPITAL | Age: 38
End: 2023-12-22
Payer: MEDICARE

## 2023-12-22 DIAGNOSIS — F19.10 POLYSUBSTANCE ABUSE (MULTI): Primary | ICD-10-CM

## 2023-12-22 LAB
BASOPHILS # BLD AUTO: 0.04 X10*3/UL (ref 0–0.1)
BASOPHILS NFR BLD AUTO: 0.5 %
EOSINOPHIL # BLD AUTO: 0.04 X10*3/UL (ref 0–0.7)
EOSINOPHIL NFR BLD AUTO: 0.5 %
ERYTHROCYTE [DISTWIDTH] IN BLOOD BY AUTOMATED COUNT: 13.2 % (ref 11.5–14.5)
HCT VFR BLD AUTO: 36.3 % (ref 41–52)
HGB BLD-MCNC: 12.8 G/DL (ref 13.5–17.5)
IMM GRANULOCYTES # BLD AUTO: 0.03 X10*3/UL (ref 0–0.7)
IMM GRANULOCYTES NFR BLD AUTO: 0.3 % (ref 0–0.9)
LYMPHOCYTES # BLD AUTO: 2.66 X10*3/UL (ref 1.2–4.8)
LYMPHOCYTES NFR BLD AUTO: 30.8 %
MCH RBC QN AUTO: 28.2 PG (ref 26–34)
MCHC RBC AUTO-ENTMCNC: 35.3 G/DL (ref 32–36)
MCV RBC AUTO: 80 FL (ref 80–100)
MONOCYTES # BLD AUTO: 0.95 X10*3/UL (ref 0.1–1)
MONOCYTES NFR BLD AUTO: 11 %
NEUTROPHILS # BLD AUTO: 4.91 X10*3/UL (ref 1.2–7.7)
NEUTROPHILS NFR BLD AUTO: 56.9 %
NRBC BLD-RTO: 0 /100 WBCS (ref 0–0)
PLATELET # BLD AUTO: 442 X10*3/UL (ref 150–450)
RBC # BLD AUTO: 4.54 X10*6/UL (ref 4.5–5.9)
WBC # BLD AUTO: 8.6 X10*3/UL (ref 4.4–11.3)

## 2023-12-22 PROCEDURE — 80053 COMPREHEN METABOLIC PANEL: CPT | Performed by: STUDENT IN AN ORGANIZED HEALTH CARE EDUCATION/TRAINING PROGRAM

## 2023-12-22 PROCEDURE — 99284 EMERGENCY DEPT VISIT MOD MDM: CPT | Mod: 25

## 2023-12-22 PROCEDURE — 84443 ASSAY THYROID STIM HORMONE: CPT | Performed by: STUDENT IN AN ORGANIZED HEALTH CARE EDUCATION/TRAINING PROGRAM

## 2023-12-22 PROCEDURE — 80307 DRUG TEST PRSMV CHEM ANLYZR: CPT | Performed by: STUDENT IN AN ORGANIZED HEALTH CARE EDUCATION/TRAINING PROGRAM

## 2023-12-22 PROCEDURE — 93010 ELECTROCARDIOGRAM REPORT: CPT | Performed by: EMERGENCY MEDICINE

## 2023-12-22 PROCEDURE — 36415 COLL VENOUS BLD VENIPUNCTURE: CPT | Performed by: STUDENT IN AN ORGANIZED HEALTH CARE EDUCATION/TRAINING PROGRAM

## 2023-12-22 PROCEDURE — 93005 ELECTROCARDIOGRAM TRACING: CPT

## 2023-12-22 PROCEDURE — 87635 SARS-COV-2 COVID-19 AMP PRB: CPT | Performed by: STUDENT IN AN ORGANIZED HEALTH CARE EDUCATION/TRAINING PROGRAM

## 2023-12-22 PROCEDURE — 2500000001 HC RX 250 WO HCPCS SELF ADMINISTERED DRUGS (ALT 637 FOR MEDICARE OP): Performed by: STUDENT IN AN ORGANIZED HEALTH CARE EDUCATION/TRAINING PROGRAM

## 2023-12-22 PROCEDURE — 99285 EMERGENCY DEPT VISIT HI MDM: CPT | Performed by: EMERGENCY MEDICINE

## 2023-12-22 PROCEDURE — 85025 COMPLETE CBC W/AUTO DIFF WBC: CPT | Performed by: STUDENT IN AN ORGANIZED HEALTH CARE EDUCATION/TRAINING PROGRAM

## 2023-12-22 PROCEDURE — 80143 DRUG ASSAY ACETAMINOPHEN: CPT | Performed by: STUDENT IN AN ORGANIZED HEALTH CARE EDUCATION/TRAINING PROGRAM

## 2023-12-22 RX ORDER — LORAZEPAM 0.5 MG/1
2 TABLET ORAL ONCE
Status: COMPLETED | OUTPATIENT
Start: 2023-12-22 | End: 2023-12-22

## 2023-12-22 RX ADMIN — LORAZEPAM 2 MG: 0.5 TABLET ORAL at 23:34

## 2023-12-22 SDOH — HEALTH STABILITY: MENTAL HEALTH: HAVE YOU HAD THESE THOUGHTS AND HAD SOME INTENTION OF ACTING ON THEM?: NO

## 2023-12-22 SDOH — HEALTH STABILITY: MENTAL HEALTH: BEHAVIORS/MOOD: ANXIOUS;COOPERATIVE

## 2023-12-22 SDOH — HEALTH STABILITY: MENTAL HEALTH: SUICIDE ASSESSMENT: ADULT (C-SSRS)

## 2023-12-22 SDOH — HEALTH STABILITY: MENTAL HEALTH: HAVE YOU BEEN THINKING ABOUT HOW YOU MIGHT DO THIS?: NO

## 2023-12-22 SDOH — HEALTH STABILITY: MENTAL HEALTH: HAVE YOU ACTUALLY HAD ANY THOUGHTS OF KILLING YOURSELF?: YES

## 2023-12-22 SDOH — HEALTH STABILITY: MENTAL HEALTH: HAVE YOU WISHED YOU WERE DEAD OR WISHED YOU COULD GO TO SLEEP AND NOT WAKE UP?: YES

## 2023-12-22 SDOH — HEALTH STABILITY: MENTAL HEALTH: HAVE YOU EVER DONE ANYTHING, STARTED TO DO ANYTHING, OR PREPARED TO DO ANYTHING TO END YOUR LIFE?: NO

## 2023-12-22 SDOH — HEALTH STABILITY: MENTAL HEALTH: CONTENT: UNREMARKABLE

## 2023-12-22 ASSESSMENT — PAIN SCALES - GENERAL
PAINLEVEL_OUTOF10: 0 - NO PAIN
PAINLEVEL_OUTOF10: 0 - NO PAIN

## 2023-12-22 ASSESSMENT — COLUMBIA-SUICIDE SEVERITY RATING SCALE - C-SSRS
1. IN THE PAST MONTH, HAVE YOU WISHED YOU WERE DEAD OR WISHED YOU COULD GO TO SLEEP AND NOT WAKE UP?: NO
6. HAVE YOU EVER DONE ANYTHING, STARTED TO DO ANYTHING, OR PREPARED TO DO ANYTHING TO END YOUR LIFE?: NO
2. HAVE YOU ACTUALLY HAD ANY THOUGHTS OF KILLING YOURSELF?: NO
4. HAVE YOU HAD THESE THOUGHTS AND HAD SOME INTENTION OF ACTING ON THEM?: NO
5. HAVE YOU STARTED TO WORK OUT OR WORKED OUT THE DETAILS OF HOW TO KILL YOURSELF? DO YOU INTEND TO CARRY OUT THIS PLAN?: NO

## 2023-12-22 ASSESSMENT — LIFESTYLE VARIABLES
EVER FELT BAD OR GUILTY ABOUT YOUR DRINKING: NO
REASON UNABLE TO ASSESS: NO
HAVE PEOPLE ANNOYED YOU BY CRITICIZING YOUR DRINKING: NO
HAVE YOU EVER FELT YOU SHOULD CUT DOWN ON YOUR DRINKING: NO
EVER HAD A DRINK FIRST THING IN THE MORNING TO STEADY YOUR NERVES TO GET RID OF A HANGOVER: NO

## 2023-12-22 ASSESSMENT — PAIN - FUNCTIONAL ASSESSMENT: PAIN_FUNCTIONAL_ASSESSMENT: 0-10

## 2023-12-23 VITALS
WEIGHT: 230 LBS | DIASTOLIC BLOOD PRESSURE: 105 MMHG | OXYGEN SATURATION: 98 % | HEART RATE: 118 BPM | BODY MASS INDEX: 32.93 KG/M2 | HEIGHT: 70 IN | RESPIRATION RATE: 16 BRPM | TEMPERATURE: 98.1 F | SYSTOLIC BLOOD PRESSURE: 159 MMHG

## 2023-12-23 LAB
ALBUMIN SERPL BCP-MCNC: 4.8 G/DL (ref 3.4–5)
ALP SERPL-CCNC: 78 U/L (ref 33–120)
ALT SERPL W P-5'-P-CCNC: 51 U/L (ref 10–52)
AMPHETAMINES UR QL SCN: ABNORMAL
ANION GAP SERPL CALC-SCNC: 17 MMOL/L (ref 10–20)
APAP SERPL-MCNC: <10 UG/ML
AST SERPL W P-5'-P-CCNC: 70 U/L (ref 9–39)
ATRIAL RATE: 120 BPM
BARBITURATES UR QL SCN: ABNORMAL
BENZODIAZ UR QL SCN: ABNORMAL
BILIRUB SERPL-MCNC: 0.7 MG/DL (ref 0–1.2)
BUN SERPL-MCNC: 21 MG/DL (ref 6–23)
BZE UR QL SCN: ABNORMAL
CALCIUM SERPL-MCNC: 10.1 MG/DL (ref 8.6–10.6)
CANNABINOIDS UR QL SCN: ABNORMAL
CHLORIDE SERPL-SCNC: 99 MMOL/L (ref 98–107)
CO2 SERPL-SCNC: 27 MMOL/L (ref 21–32)
CREAT SERPL-MCNC: 1.17 MG/DL (ref 0.5–1.3)
ETHANOL SERPL-MCNC: <10 MG/DL
FENTANYL+NORFENTANYL UR QL SCN: ABNORMAL
GFR SERPL CREATININE-BSD FRML MDRD: 83 ML/MIN/1.73M*2
GLUCOSE SERPL-MCNC: 144 MG/DL (ref 74–99)
HOLD SPECIMEN: NORMAL
OPIATES UR QL SCN: ABNORMAL
OXYCODONE+OXYMORPHONE UR QL SCN: ABNORMAL
P AXIS: 54 DEGREES
P OFFSET: 211 MS
P ONSET: 161 MS
PCP UR QL SCN: ABNORMAL
POTASSIUM SERPL-SCNC: 3.9 MMOL/L (ref 3.5–5.3)
PR INTERVAL: 120 MS
PROT SERPL-MCNC: 8.2 G/DL (ref 6.4–8.2)
Q ONSET: 221 MS
QRS COUNT: 19 BEATS
QRS DURATION: 74 MS
QT INTERVAL: 324 MS
QTC CALCULATION(BAZETT): 457 MS
QTC FREDERICIA: 408 MS
R AXIS: 75 DEGREES
SALICYLATES SERPL-MCNC: <3 MG/DL
SARS-COV-2 RNA RESP QL NAA+PROBE: NOT DETECTED
SODIUM SERPL-SCNC: 139 MMOL/L (ref 136–145)
T AXIS: 45 DEGREES
T OFFSET: 383 MS
TSH SERPL-ACNC: 3.66 MIU/L (ref 0.44–3.98)
VENTRICULAR RATE: 120 BPM

## 2023-12-23 SDOH — HEALTH STABILITY: MENTAL HEALTH: IN THE PAST FEW WEEKS, HAVE YOU FELT THAT YOU OR YOUR FAMILY WOULD BE BETTER OFF IF YOU WERE DEAD?: NO

## 2023-12-23 SDOH — HEALTH STABILITY: MENTAL HEALTH: ACTIVE SUICIDAL IDEATION WITH SOME INTENT TO ACT, WITHOUT SPECIFIC PLAN (PAST 1 MONTH): NO

## 2023-12-23 SDOH — HEALTH STABILITY: MENTAL HEALTH: ANXIETY SYMPTOMS: NO PROBLEMS REPORTED OR OBSERVED.

## 2023-12-23 SDOH — HEALTH STABILITY: MENTAL HEALTH: IN THE PAST WEEK, HAVE YOU BEEN HAVING THOUGHTS ABOUT KILLING YOURSELF?: NO

## 2023-12-23 SDOH — HEALTH STABILITY: MENTAL HEALTH: DEPRESSION SYMPTOMS: NO PROBLEMS REPORTED OR OBSERVED.

## 2023-12-23 SDOH — HEALTH STABILITY: MENTAL HEALTH: ARE YOU HAVING THOUGHTS OF KILLING YOURSELF RIGHT NOW?: NO

## 2023-12-23 SDOH — HEALTH STABILITY: MENTAL HEALTH: NON-SPECIFIC ACTIVE SUICIDAL THOUGHTS (PAST 1 MONTH): NO

## 2023-12-23 SDOH — HEALTH STABILITY: MENTAL HEALTH: SUICIDAL BEHAVIOR (LIFETIME): NO

## 2023-12-23 SDOH — HEALTH STABILITY: MENTAL HEALTH: HAVE YOU EVER TRIED TO KILL YOURSELF?: NO

## 2023-12-23 SDOH — HEALTH STABILITY: MENTAL HEALTH: ACTIVE SUICIDAL IDEATION WITH SPECIFIC PLAN AND INTENT (PAST 1 MONTH): NO

## 2023-12-23 SDOH — HEALTH STABILITY: MENTAL HEALTH: WISH TO BE DEAD (PAST 1 MONTH): NO

## 2023-12-23 SDOH — ECONOMIC STABILITY: HOUSING INSECURITY: FEELS SAFE LIVING IN HOME: YES

## 2023-12-23 SDOH — HEALTH STABILITY: MENTAL HEALTH: IN THE PAST FEW WEEKS, HAVE YOU WISHED YOU WERE DEAD?: NO

## 2023-12-23 ASSESSMENT — LIFESTYLE VARIABLES
SUBSTANCE_ABUSE_PAST_12_MONTHS: YES
PRESCIPTION_ABUSE_PAST_12_MONTHS: NO

## 2023-12-23 ASSESSMENT — PAIN SCALES - GENERAL: PAINLEVEL_OUTOF10: 0 - NO PAIN

## 2023-12-23 NOTE — ED PROVIDER NOTES
"CC: Psychiatric Evaluation     HPI: Nathan Vaughn is a 38-year-old male with history of polysubstance use disorder, schizoaffective disorder, bipolar disorder presents to the emergency department for psychiatric evaluation.  States he was prematurely released from rehab where he was getting help for his substance use.  States he has recently been using meth, cocaine, marijuana, and alcohol.  Last use was today.  States he feels like people are trying to kill him and he has having auditory hallucinations.  States he is \"tired of it all\" but denies a specific plan of harming himself.  States he has not been able to sleep due to his symptoms. Denies any physical symptoms or recent trauma. Notes he has housing insecurity for the most part and intermittently lives with grandmother who is his only social support.    Limitations to History: none  Additional History Obtained from: none    PMHx/PSHx:  Per HPI.   - has a past medical history of Aggressive behavior (07/26/2019), Cannabis use disorder (03/15/2023), Cellulitis (11/01/2021), Electrolyte disorder (07/23/2019), Homicidal thoughts (11/04/2023), Methamphetamine intoxication (CMS/[facility]) (07/24/2019), Polysubstance dependence, non-opioid, in remission (CMS/[facility]) (04/15/2022), Renal disease (07/23/2019), Stimulant use disorder (03/15/2023), Substance-induced psychotic disorder (CMS/[facility]) (03/13/2023), and Thrombocytosis (11/04/2021).  - has no past surgical history on file.    Social History:  - Tobacco:  reports that he has been smoking cigarettes. He has a 20.00 pack-year smoking history. He has never used smokeless tobacco.   - Alcohol:  reports current alcohol use.   - Drugs:  reports current drug use. Drug: Methamphetamines.     Medications: Reviewed in EMR.     Allergies:  Patient has no known allergies.    ???????????????????????????????????????????????????????????????  Triage Vitals:  T 36.7 °C (98.1 °F)  HR (!) 122  BP (!) 166/112  RR 18  O2 98 % None (Room " air)    Physical Exam   Gen: awake, well-appearing, no distress  Eyes: no conjunctival injection or scleral icterus, pupils equal, extraocular movements grossly intact  ENT: nares patent, moist mucous membranes  Neck: supple, trachea midline, no masses  Heart: tachycardia, regular rhythm, audible heart sounds  Lungs: clear to auscultation bilaterally, no increased work of breathing  Abdomen: soft, nondistended, nontender, no guarding or rebound  Extremities: well-perfused, no edema  Neuro: alert, conversant, no facial asymmetry, speech fluent, moving all extremities  Psych: anxious but resting calmly in bed, cooperative, linear thought process, does not appear internally stimulated. Makes good eye contact.   ???????????????????????????????????????????????????????????????  ED Course/Medical Decision Makin-year-old male history including polysubstance use disorder, bipolar disorder, schizoaffective disorder presenting to the emergency department for psychiatric evaluation, reporting auditory hallucinations, vague suicidal ideation and is concerned people are trying to kill him.  States he last used meth, cocaine, marijuana, alcohol today after he was released from his rehab facility.  On arrival he is tachycardic and slightly hypertensive but anxious.  He is mentating appropriately, internally stimulated, and is able to have a linear conversation. Labs were obtained for medical clearance which reviewed and interpreted independently.  He tested positive for cocaine and amphetamines. Otherwise labs are relatively unremarkable.  He is medically clear.  EPAT was consulted and they do not feel that he warrants inpatient psychiatric admission at this time. The patient is amenable to speaking with [facility] for help with his substance use disorder and they are working on placement. The patient was given oral ativan for symptomatic relief. He was accepted by [facility]. However near end of my shift he was becoming  hesitant about going there and expressed desire to go to [facility] instead as he was more familiar with that facility. He was advised to go directly to their facility to obtain treatment. As he has been cleared from psychiatric standpoint and demonstrates capacity, he does have the ability to make his own decisions should he choose not to go. Benefits of going through with treatment discussed and he expresses good understanding and wants help.   ED Course as of 12/26/23 0144   Sat Dec 23, 2023   0047 Medically cleared [UDAY]   0245 EKG per my interpretation reveals sinus tachycardia, rate 120, normal axis, normal intervals, no significant ST elevation/depression or T wave abnormalities [LM]      ED Course User Index  [UDAY] Reno Feldman DO  [LM] Allison Rice MD         Diagnoses as of 12/26/23 0144   Polysubstance abuse (CMS/HCC)       External records reviewed: recent inpatient, clinic, and prior ED notes  Diagnostic imaging independently reviewed/interpreted by me (as reflected in MDM) includes: labs, ekg  Social Determinants Affecting Care: Mental health issues  Discussion of management with other providers: EPAT, [facility]  Escalation of Care: inpatient [facility] services    Disposition: discharge      Procedures ? [facility] last updated 12/23/2023 2:46 AM        Allison Rice MD  12/26/23 0149

## 2023-12-23 NOTE — PROGRESS NOTES
EPAT - Social Work Psychiatric Assessment    Arrival Details  Mode of Arrival: Ambulatory  Admission Source: Emergency department  Admission Type: Voluntary  EPAT Assessment Start Date: 12/22/23  EPAT Assessment Start Time: 0002  Name of : Geovanna Whitley M.Ed., GAIL    History of Present Illness    Admission Reason: Paranoia/HI/Drug use    The patient is a 38 yr old AA male with a history of polysubstance abuse (meth and crack) (severe), bipolar I, and schizoaffective disorder. He presents in the ED with complaints of paranoia and feels like “people are trying to kill him.” He reports hearing voices. He endorses homicidal ideation towards people who are trying to harm him. He endorses using meth and cocaine hours before coming into the ED. The patient scored low risk for suicide on the C-SSRS when assessed in the ED. He was referred to EPAT for psychiatric evaluation.     Patient history was reviewed before EPAT evaluation.     Psychiatric Diagnosis History: Polysubstance Use Disorder (meth and crack)(severe), schizoaffective disorder, and bipolar disorder.    Psychiatric Medication/Treatment History: Patient is on psych medications. This is the patient's 11th ED visit within the past year that is documented and he has had 7 inpatient psychiatric admissions this year and reports also being in and out of rehab with back to back admissions of rehab also. Patient is chronically non-compliant with outpatient follow up and is chronically homeless.     It has been observed, due to patient's chronic use of the ED and the chronic presenting complaints, that patient symptoms are most likely malingering and that he utilizes the ED and inpatient psychiatric admissions as a source of shelter, food, and bed. There are multiple incidents throughout just this past year documenting the patient's behavior. Per patient medical chart history, he often has reported multiple times that he “feels suicidal and plans on taking as  "many street drugs as he can to kill himself.” He also has reported multiple times feeling homicidal towards “people who are trying to harm him”, however, the patient has never harmed anyone and has ever attempted to harm himself. The patient is at his baseline and does not appear to improve from inpatient psych admissions.    Quoted from patient medical history note from 10/26/2023 at [facility]- “Pt was at [facility] prior to [facility]. [facility] to get him admitted but were told that the patient needed to come to [facility] to be admitted there which is why he was sent here. Pt states that [facility] is \"making up lies\" and that he does not want to go back there and wants to call his family to come pick him up instead. He feels his schizophrenia is baseline and that \"he is not in crisis\" and has been taking his medications.”     SW Readmission Information   Readmission within 30 Days: Yes  Previous ED Visit Date and Reason : 12/11/2023 Same complaint as today's  Previous Discharge Date and Location: 12/11/2023 at [facility]  Factors Contributing to  Readmission Inpatient/ED (Team Perspective): Homeless, Substance Abuse, Failed to Keep Follow-Up Appointments (Homelessness and substance abuse)    Psychiatric Symptoms  Anxiety Symptoms: No problems reported or observed.  Depression Symptoms: No problems reported or observed.  Gely Symptoms: No problems reported or observed.    Psychosis Symptoms  Hallucination Type: No problems reported or observed.  Delusion Type: No problems reported or observed.    Additional Symptoms - Adult  Generalized Anxiety Disorder: No problems reported or observed.  Obsessive Compulsive Disorder: No problems reported or observed.  Panic Attack: No problems reported or observed.  Post Traumatic Stress Disorder: No problems reported or observed.  Delirium: No problems reported or observed.  Review of Symptoms Comments: Patient reports " he is homicidal because his friend kicked him out. He did not know the friend's name. (Patient endorses meth and crack use a few hours before coming into ED)    Past Psychiatric History/Meds/Treatments  Past Psychiatric History: Polysubstance Use Disorder (meth and crack)(severe), schizoaffective disorder, and bipolar disorder.  Past Psychiatric Meds/Treatments: This is the patient’s 11th ED visit within the past year that is documented and he has had 7 inpatient psychiatric admissions this year and reports also being in and out of rehab with back to back admissions of rehab also. Patient is chronically non-compliant with outpatient follow up and is chronically homeless.  Past Violence/Victimization History: None reported    Current Mental Health Contacts   Name/Phone Number: No CM for the past 6 months. Patient does not follwo up   Last Appointment Date: N/A  Provider Name/Phone Number: Unknown  Provider Last Appointment Date: Unknown. Patient only has been to the ED    Support System: Community    Living Arrangement: Homeless, Shelter    Home Safety  Feels Safe Living in Home: Yes  Potentially Unsafe Housing Conditions: Unable to Assess    Income Information  Employment Status for: Patient  Employment Status: Disabled  Income Source: Disability  Current/Previous Occupation:  (None)    [employer] Service/Education History  Current or Previous  Service: None  Education Level:  (Not assessed)    Social/Cultural History  Social History: Patient is own guarantor and [place] citizen  Cultural Requests During Hospitalization: None  Spiritual Requests During Hospitalization: None  Important Activities: Social    Legal  Legal Considerations: Other (Comment) (Patient is own guarantor)  Assistance with Managing/Advocating Healthcare Needs: Other (Comment) (Patient is own guarantor)  Criminal Activity/ Legal Involvement Pertinent to Current Situation/ Hospitalization: None reported    Drug  Screening  Have you used any substances (canabis, cocaine, heroin, hallucinogens, inhalants, etc.) in the past 12 months?: Yes (Meth/Crack)  Have you used any prescription drugs other than prescribed in the past 12 months?: No  Is a toxicology screen needed?: Yes    Stage of Change  Stage of Change: Contemplation  History of Treatment: Inpatient, Dual  Type of Treatment Offered: Other (Comment) ([facility])  Treatment Offered: Accepted  Duration of Substance Use: Unknown  Frequency of Substance Use: Daily (Patient has history of using immediatey after getting out of inpatient treatment)  Age of First Substance Use: Unknown    Psychosocial  Psychosocial (WDL): Within Defined Limits  Behaviors/Mood: Appears intoxicated, Manipulative    Orientation  Orientation Level: Oriented X4    General Appearance  Motor Activity: Hyperactivity  Speech Pattern: Other (Comment) (Normal pattern)  General Attitude: Cooperative  Appearance/Hygiene: Unremarkable    Thought Process  Coherency: Other (Comment) (Patient was coherent but appeared high from meth and crack/cocaine)  Content: Blaming others  Delusions: Other (Comment) (No delusions observed)  Perception: Not altered  Hallucination: None (Patient reported none to this writer but endorsed hallucinations to ED staff at triage)  Judgment/Insight: Poor  Confusion: None  Cognition: Follows commands    Sleep Pattern  Sleep Pattern: Disturbed/interrupted sleep (Due to meth and crack use)    Risk Factors  Self Harm/Suicidal Ideation Plan: Patient denied, then as this writer was leaving said he was going to take all the streets drugs.  Previous Self Harm/Suicidal Plans: Patient reported he was going to take all the street drugs he could  Risk Factors: Substance abuse  Description of Thoughts/Ideas Leaving Unit Now: Patient appears to be using SI secondary to gaining access to food/shelter/bed    Violence Risk Assessment  Assessment of Violence: On admission  Thoughts of Harm to Others: Yes  - currently present  Description of Violence Behavior: Patient said he would use his hands, when asked what his plan was.  Homicidal ideation: Yes - currently present  Current Homicidal Intent: Yes - currently present  Current Homicidal Plan: Yes - currently present  Access to Homicidal Means: No  Identified Victim: Patient said it was a friend who kicked him out. He didn't know the name. (Patient appears to be using HI secondary to gaining access to food, shelter, and a bed.)  History of Harm to Others: No  Access to Weapons: No  Criminal Charges Pending: No    Ability to Assess Risk Screen  Risk Screen - Ability to Assess: Able to be screened  Ask Suicide-Screening Questions  1. In the past few weeks, have you wished you were dead?: No  2. In the past few weeks, have you felt that you or your family would be better off if you were dead?: No  3. In the past week, have you been having thoughts about killing yourself?: No  4. Have you ever tried to kill yourself?: No  5. Are you having thoughts of killing yourself right now?: No  Calculated Risk Score: No intervention is necessary  [place] Suicide Severity Rating Scale (Screener/Recent Self-Report)  1. Wish to be Dead (Past 1 Month): No  2. Non-Specific Active Suicidal Thoughts (Past 1 Month): No  3. Active Suicidal Ideation with any Methods (Not Plan) Without Intent to Act (Past 1 Month): No  4. Active Suicidal Ideation with Some Intent to Act, Without Specific Plan (Past 1 Month): No  5. Active Suicidal Ideation with Specific Plan and Intent (Past 1 Month): No  6. Suicidal Behavior (Lifetime): No  Calculated C-SSRS Risk Score (Lifetime/Recent): No Risk Indicated  Step 1: Risk Factors  Current & Past Psychiatric Dx: Mood disorder, Psychotic disorder, Alcohol/substance abuse disorders  Presenting Symptoms: Other (Comment) (None)  Family History: Other (Comment) (None reported)  Precipitants/Stressors: Substance intoxication or withdrawal (Chronic  homelessness)  Change in Treatment: Recent inpatient discharge, Non-compliant or not receiving treatment  Access to Lethal Methods : No  Step 2: Protective Factors   Protective Factors Internal: Identifies reasons for living  Protective Factors External: Positive therapeutic relationships  Step 3: Suicidal Ideation Intensity  Most Severe Suicidal Ideation Identified: Patient strongly denied until this writer was leaving and said he would take all the street drugs he could. Patient wants to be admitted for secondary gains to get food, bed, and shelter.  How Many Times Have You Had These Thoughts: Once a week  When You Have the Thoughts How Long do They Last : Less than 1 hour/some of the time  Could/Can You Stop Thinking About Killing Yourself or Wanting to Die if You Want to: Can control thoughts with little difficulty  Are There Things - Anyone or Anything - That Stopped You From Wanting to Die or Acting on: Uncertain that deterrents stopped you  What Sort of Reasons Did You Have For Thinking About Wanting to Die or Killing Yourself: Completely to get attention, revenge, or a reaction from others  Total Score: 10  Step 5: Documentation  Risk Level: Low suicide risk (Patient has consistently reported SI but has never attempted. After multiple ED visits and inpatient psychiatric admissions it is documented that the patient is at baseline and is not at risk for harming himself.)    Psychiatric Impression and Plan of Care    The patient is a 38 yr old AA male who presents in the ED for HI/Drug use/psychosis. He was cooperative but inconsistent with what his symptoms were and appeared to be attempting to manipulate this writer to be able to access inpatient psychiatric admission.     Collateral: No collateral contacts were able to be reached for patient.    The patient denies SI/AH/VH. The patient reported to ED staff at triage that he was hearing voices but strongly denied any AH at the time of EPAT evaluation. He  endorses that he feels homicidal because of people who kicked him out. Patient said, “You know, because of my situation and feeling stressed, I think I'm homicidal”. When asked what his plan was, he said, “I'm not going to hurt anyone. I'm just homicidal because I'm mad that my friend kicked me out”. This writer then mentioned that if someone is homicidal, it means they want to kill someone else. This writer then asked, “Do you want to kill your friend?” The patient said, “I'm going to hurt him”. Then this writer asked if he had access to firearms and he said, “No! I wouldn't kill anyone!” Then he said, “I got kicked out of rehab and need to go rehab”. This writer then redirected the patient back to evaluating the patient for HI. This writer then asked what the patient's friend's name was and the patient could not remember the friend's name who he wanted to “harm”. When asked if he had a homicidal plan, the patient said, “Ummm… I'll just use my hands”. The patient endorsed using meth and crack hours before coming into the ED. He did test positive for meth and cocaine on his drug screen. The patient accepted this writer's offer of drug treatment resources through [facility]. The patient then immediately jumped to wanting to know what services he had left through his insurance. He said, “Do you know what I have left to use on my insurance? This writer reminded the patient that they were here to do the psychiatric assessment but that he can speak with a  about that.  As this writer was leaving, the patient then sat up and said, “I'm going to take all the street drugs I can to overdose!” The way in which the patient's tone and intonation of voice indicated that the patient was using SI as threat in order to get inpatient psychiatric admission. The patient scored low risk for suicide on the C-SSRS when assessed by the ED and when assessed by this writer. The patient has never attempted to harm himself before and  has never attempted to harm anyone else before regardless of making multiple complaints of HI and SI in the past.     The patient is chronically homeless and per patient medical chart and notes documenting patient behavior within the past year, it appears the patient is most likely malingering in order to gain access to shelter, bedding, and food. The patient does not meet criteria for inpatient psychiatric admission. The patient will be discharged and referred to [facility] for drug treatment resources. ED provider agrees with this plan.     Diagnostic Impression: Polysubstance Use Disorder (meth and crack/cocaine) (severe)    Specific Resources Provided to Patient: [facility] Notified: N/A  PHP/IOP Recommended: None  Specific Information Provided for PHP/IOP: None  Plan Comments: None    Outcome/Disposition  Patient's Perception of Outcome Achieved: Unable to assess  Assessment, Recommendations and Risk Level Reviewed with: Reno Feldman  Contact Name: Su Stover  Contact Number(s): [phone]  Contact Relationship: Patient's Grandparent  EPAT Assessment Completed Date: 12/23/23  EPAT Assessment Completed Time: 0245  Patient Disposition: Home

## 2023-12-23 NOTE — ED TRIAGE NOTES
"Patient presents to the ED for a psych evaluation. Patient reports that he just got out of rehab. He does use meth and crack. He states that he is homeless and lives with his grandmother intermittently because she's the only person who cares about him. Patient states that he feels like people are trying to kill him. He is hearing voices. He states that he is emotionally \"through\". Patient does have homicidal ideation towards the people who are trying to harm him. Patient has not been sleeping due to meth use because he is afraid of people harming him. Patient is scared not to use drugs for these same reasons. Last use of meth and crack was today.   "

## 2023-12-23 NOTE — DISCHARGE INSTRUCTIONS
You were offered and successfully placed at [facility] for inpatient substance use rehab, however he ultimately decided against this, instead wishing to present to [facility] where you are more familiar with the facility.  Please present directly to [facility] in order to obtain treatment for substance use disorder.
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
## 2024-04-12 ENCOUNTER — CLINICAL SUPPORT (OUTPATIENT)
Dept: EMERGENCY MEDICINE | Facility: HOSPITAL | Age: 39
DRG: 558 | End: 2024-04-12
Payer: MEDICARE

## 2024-04-12 ENCOUNTER — HOSPITAL ENCOUNTER (INPATIENT)
Facility: HOSPITAL | Age: 39
LOS: 3 days | Discharge: PSYCHIATRIC OR SUBSTANCE USE FACILITY | DRG: 558 | End: 2024-04-15
Attending: EMERGENCY MEDICINE | Admitting: STUDENT IN AN ORGANIZED HEALTH CARE EDUCATION/TRAINING PROGRAM
Payer: MEDICARE

## 2024-04-12 DIAGNOSIS — F14.90 COCAINE USE: ICD-10-CM

## 2024-04-12 DIAGNOSIS — F25.9 SCHIZOAFFECTIVE DISORDER, UNSPECIFIED TYPE (MULTI): ICD-10-CM

## 2024-04-12 DIAGNOSIS — F22 DELUSIONS (MULTI): ICD-10-CM

## 2024-04-12 DIAGNOSIS — M62.82 NON-TRAUMATIC RHABDOMYOLYSIS: Primary | ICD-10-CM

## 2024-04-12 DIAGNOSIS — F20.9 SCHIZOPHRENIA, UNSPECIFIED TYPE (MULTI): ICD-10-CM

## 2024-04-12 LAB
ALBUMIN SERPL BCP-MCNC: 4.9 G/DL (ref 3.4–5)
ALP SERPL-CCNC: 77 U/L (ref 33–120)
ALT SERPL W P-5'-P-CCNC: 49 U/L (ref 10–52)
AMPHETAMINES UR QL SCN: ABNORMAL
ANION GAP SERPL CALC-SCNC: 16 MMOL/L (ref 10–20)
APAP SERPL-MCNC: <10 UG/ML
APPEARANCE UR: CLEAR
AST SERPL W P-5'-P-CCNC: 64 U/L (ref 9–39)
BARBITURATES UR QL SCN: ABNORMAL
BASOPHILS # BLD AUTO: 0.07 X10*3/UL (ref 0–0.1)
BASOPHILS NFR BLD AUTO: 0.8 %
BENZODIAZ UR QL SCN: ABNORMAL
BILIRUB SERPL-MCNC: 1.1 MG/DL (ref 0–1.2)
BILIRUB UR STRIP.AUTO-MCNC: NEGATIVE MG/DL
BUN SERPL-MCNC: 24 MG/DL (ref 6–23)
BZE UR QL SCN: ABNORMAL
CALCIUM SERPL-MCNC: 10.4 MG/DL (ref 8.6–10.6)
CANNABINOIDS UR QL SCN: ABNORMAL
CHLORIDE SERPL-SCNC: 101 MMOL/L (ref 98–107)
CK SERPL-CCNC: 2695 U/L (ref 0–325)
CO2 SERPL-SCNC: 27 MMOL/L (ref 21–32)
COLOR UR: YELLOW
CREAT SERPL-MCNC: 1.35 MG/DL (ref 0.5–1.3)
EGFRCR SERPLBLD CKD-EPI 2021: 69 ML/MIN/1.73M*2
EOSINOPHIL # BLD AUTO: 0.12 X10*3/UL (ref 0–0.7)
EOSINOPHIL NFR BLD AUTO: 1.3 %
ERYTHROCYTE [DISTWIDTH] IN BLOOD BY AUTOMATED COUNT: 13.4 % (ref 11.5–14.5)
ETHANOL SERPL-MCNC: <10 MG/DL
FENTANYL+NORFENTANYL UR QL SCN: ABNORMAL
GLUCOSE SERPL-MCNC: 99 MG/DL (ref 74–99)
GLUCOSE UR STRIP.AUTO-MCNC: NORMAL MG/DL
HCT VFR BLD AUTO: 39.6 % (ref 41–52)
HGB BLD-MCNC: 13.3 G/DL (ref 13.5–17.5)
HIV 1+2 AB+HIV1 P24 AG SERPL QL IA: NONREACTIVE
IMM GRANULOCYTES # BLD AUTO: 0.03 X10*3/UL (ref 0–0.7)
IMM GRANULOCYTES NFR BLD AUTO: 0.3 % (ref 0–0.9)
KETONES UR STRIP.AUTO-MCNC: NEGATIVE MG/DL
LEUKOCYTE ESTERASE UR QL STRIP.AUTO: NEGATIVE
LYMPHOCYTES # BLD AUTO: 3.04 X10*3/UL (ref 1.2–4.8)
LYMPHOCYTES NFR BLD AUTO: 32.7 %
MCH RBC QN AUTO: 26.4 PG (ref 26–34)
MCHC RBC AUTO-ENTMCNC: 33.6 G/DL (ref 32–36)
MCV RBC AUTO: 79 FL (ref 80–100)
METHADONE UR QL SCN: ABNORMAL
MONOCYTES # BLD AUTO: 0.95 X10*3/UL (ref 0.1–1)
MONOCYTES NFR BLD AUTO: 10.2 %
MUCOUS THREADS #/AREA URNS AUTO: ABNORMAL /LPF
NEUTROPHILS # BLD AUTO: 5.08 X10*3/UL (ref 1.2–7.7)
NEUTROPHILS NFR BLD AUTO: 54.7 %
NITRITE UR QL STRIP.AUTO: NEGATIVE
NRBC BLD-RTO: 0 /100 WBCS (ref 0–0)
OPIATES UR QL SCN: ABNORMAL
OXYCODONE+OXYMORPHONE UR QL SCN: ABNORMAL
PCP UR QL SCN: ABNORMAL
PH UR STRIP.AUTO: 5.5 [PH]
PLATELET # BLD AUTO: 424 X10*3/UL (ref 150–450)
POTASSIUM SERPL-SCNC: 3.9 MMOL/L (ref 3.5–5.3)
PROT SERPL-MCNC: 9.3 G/DL (ref 6.4–8.2)
PROT UR STRIP.AUTO-MCNC: ABNORMAL MG/DL
RBC # BLD AUTO: 5.04 X10*6/UL (ref 4.5–5.9)
RBC # UR STRIP.AUTO: ABNORMAL /UL
RBC #/AREA URNS AUTO: ABNORMAL /HPF
SALICYLATES SERPL-MCNC: <3 MG/DL
SODIUM SERPL-SCNC: 140 MMOL/L (ref 136–145)
SP GR UR STRIP.AUTO: 1.04
TREPONEMA PALLIDUM IGG+IGM AB [PRESENCE] IN SERUM OR PLASMA BY IMMUNOASSAY: NONREACTIVE
UROBILINOGEN UR STRIP.AUTO-MCNC: NORMAL MG/DL
WBC # BLD AUTO: 9.3 X10*3/UL (ref 4.4–11.3)
WBC #/AREA URNS AUTO: ABNORMAL /HPF

## 2024-04-12 PROCEDURE — 87800 DETECT AGNT MULT DNA DIREC: CPT

## 2024-04-12 PROCEDURE — 93010 ELECTROCARDIOGRAM REPORT: CPT

## 2024-04-12 PROCEDURE — 1210000001 HC SEMI-PRIVATE ROOM DAILY

## 2024-04-12 PROCEDURE — 96360 HYDRATION IV INFUSION INIT: CPT

## 2024-04-12 PROCEDURE — 87389 HIV-1 AG W/HIV-1&-2 AB AG IA: CPT

## 2024-04-12 PROCEDURE — 93005 ELECTROCARDIOGRAM TRACING: CPT

## 2024-04-12 PROCEDURE — 2500000004 HC RX 250 GENERAL PHARMACY W/ HCPCS (ALT 636 FOR OP/ED)

## 2024-04-12 PROCEDURE — 81001 URINALYSIS AUTO W/SCOPE: CPT

## 2024-04-12 PROCEDURE — 99221 1ST HOSP IP/OBS SF/LOW 40: CPT | Performed by: STUDENT IN AN ORGANIZED HEALTH CARE EDUCATION/TRAINING PROGRAM

## 2024-04-12 PROCEDURE — 99285 EMERGENCY DEPT VISIT HI MDM: CPT | Mod: 25

## 2024-04-12 PROCEDURE — 82550 ASSAY OF CK (CPK): CPT

## 2024-04-12 PROCEDURE — 99285 EMERGENCY DEPT VISIT HI MDM: CPT

## 2024-04-12 PROCEDURE — 86780 TREPONEMA PALLIDUM: CPT

## 2024-04-12 PROCEDURE — 36415 COLL VENOUS BLD VENIPUNCTURE: CPT

## 2024-04-12 PROCEDURE — 96361 HYDRATE IV INFUSION ADD-ON: CPT

## 2024-04-12 PROCEDURE — 80143 DRUG ASSAY ACETAMINOPHEN: CPT

## 2024-04-12 PROCEDURE — 2500000004 HC RX 250 GENERAL PHARMACY W/ HCPCS (ALT 636 FOR OP/ED): Performed by: STUDENT IN AN ORGANIZED HEALTH CARE EDUCATION/TRAINING PROGRAM

## 2024-04-12 PROCEDURE — 80307 DRUG TEST PRSMV CHEM ANLYZR: CPT

## 2024-04-12 PROCEDURE — 2500000001 HC RX 250 WO HCPCS SELF ADMINISTERED DRUGS (ALT 637 FOR MEDICARE OP)

## 2024-04-12 PROCEDURE — 87661 TRICHOMONAS VAGINALIS AMPLIF: CPT

## 2024-04-12 PROCEDURE — 80053 COMPREHEN METABOLIC PANEL: CPT

## 2024-04-12 PROCEDURE — 85025 COMPLETE CBC W/AUTO DIFF WBC: CPT

## 2024-04-12 RX ORDER — LORAZEPAM 1 MG/1
2 TABLET ORAL ONCE
Status: COMPLETED | OUTPATIENT
Start: 2024-04-12 | End: 2024-04-12

## 2024-04-12 RX ORDER — DIPHENHYDRAMINE HCL 25 MG
50 CAPSULE ORAL ONCE
Status: COMPLETED | OUTPATIENT
Start: 2024-04-12 | End: 2024-04-12

## 2024-04-12 RX ORDER — OLANZAPINE 5 MG/1
15 TABLET ORAL NIGHTLY
Status: DISCONTINUED | OUTPATIENT
Start: 2024-04-12 | End: 2024-04-15 | Stop reason: HOSPADM

## 2024-04-12 RX ORDER — SODIUM CHLORIDE, SODIUM LACTATE, POTASSIUM CHLORIDE, CALCIUM CHLORIDE 600; 310; 30; 20 MG/100ML; MG/100ML; MG/100ML; MG/100ML
250 INJECTION, SOLUTION INTRAVENOUS CONTINUOUS
Status: DISCONTINUED | OUTPATIENT
Start: 2024-04-12 | End: 2024-04-14

## 2024-04-12 RX ORDER — HALOPERIDOL 5 MG/1
5 TABLET ORAL ONCE
Status: COMPLETED | OUTPATIENT
Start: 2024-04-12 | End: 2024-04-12

## 2024-04-12 RX ADMIN — DIPHENHYDRAMINE HYDROCHLORIDE 50 MG: 25 CAPSULE ORAL at 17:02

## 2024-04-12 RX ADMIN — SODIUM CHLORIDE, POTASSIUM CHLORIDE, SODIUM LACTATE AND CALCIUM CHLORIDE 1000 ML: 600; 310; 30; 20 INJECTION, SOLUTION INTRAVENOUS at 20:54

## 2024-04-12 RX ADMIN — LORAZEPAM 2 MG: 1 TABLET ORAL at 17:02

## 2024-04-12 RX ADMIN — SODIUM CHLORIDE, POTASSIUM CHLORIDE, SODIUM LACTATE AND CALCIUM CHLORIDE 1000 ML: 600; 310; 30; 20 INJECTION, SOLUTION INTRAVENOUS at 19:44

## 2024-04-12 RX ADMIN — SODIUM CHLORIDE, POTASSIUM CHLORIDE, SODIUM LACTATE AND CALCIUM CHLORIDE 100 ML/HR: 600; 310; 30; 20 INJECTION, SOLUTION INTRAVENOUS at 23:39

## 2024-04-12 RX ADMIN — HALOPERIDOL 5 MG: 5 TABLET ORAL at 17:02

## 2024-04-12 SDOH — HEALTH STABILITY: MENTAL HEALTH: HAVE YOU EVER DONE ANYTHING, STARTED TO DO ANYTHING, OR PREPARED TO DO ANYTHING TO END YOUR LIFE?: NO

## 2024-04-12 SDOH — HEALTH STABILITY: MENTAL HEALTH: BEHAVIORS/MOOD: CALM;COOPERATIVE;SLEEPING;MANIPULATIVE

## 2024-04-12 SDOH — HEALTH STABILITY: MENTAL HEALTH: HAVE YOU ACTUALLY HAD ANY THOUGHTS OF KILLING YOURSELF?: NO

## 2024-04-12 SDOH — HEALTH STABILITY: MENTAL HEALTH: HAVE YOU WISHED YOU WERE DEAD OR WISHED YOU COULD GO TO SLEEP AND NOT WAKE UP?: NO

## 2024-04-12 SDOH — HEALTH STABILITY: MENTAL HEALTH: SUICIDE ASSESSMENT: ADULT (C-SSRS)

## 2024-04-12 SDOH — HEALTH STABILITY: MENTAL HEALTH: DELUSIONS: PARANOID

## 2024-04-12 SDOH — HEALTH STABILITY: MENTAL HEALTH: CONTENT: PREOCCUPATION

## 2024-04-12 SDOH — HEALTH STABILITY: MENTAL HEALTH

## 2024-04-12 SDOH — HEALTH STABILITY: MENTAL HEALTH: SLEEP PATTERN: SLEEPS ALL NIGHT

## 2024-04-12 ASSESSMENT — COLUMBIA-SUICIDE SEVERITY RATING SCALE - C-SSRS
1. SINCE LAST CONTACT, HAVE YOU WISHED YOU WERE DEAD OR WISHED YOU COULD GO TO SLEEP AND NOT WAKE UP?: NO
2. HAVE YOU ACTUALLY HAD ANY THOUGHTS OF KILLING YOURSELF?: NO
6. HAVE YOU EVER DONE ANYTHING, STARTED TO DO ANYTHING, OR PREPARED TO DO ANYTHING TO END YOUR LIFE?: NO
6. HAVE YOU EVER DONE ANYTHING, STARTED TO DO ANYTHING, OR PREPARED TO DO ANYTHING TO END YOUR LIFE?: NO
2. HAVE YOU ACTUALLY HAD ANY THOUGHTS OF KILLING YOURSELF?: NO
1. IN THE PAST MONTH, HAVE YOU WISHED YOU WERE DEAD OR WISHED YOU COULD GO TO SLEEP AND NOT WAKE UP?: NO

## 2024-04-12 ASSESSMENT — LIFESTYLE VARIABLES
EVER HAD A DRINK FIRST THING IN THE MORNING TO STEADY YOUR NERVES TO GET RID OF A HANGOVER: NO
EVER FELT BAD OR GUILTY ABOUT YOUR DRINKING: NO
HAVE PEOPLE ANNOYED YOU BY CRITICIZING YOUR DRINKING: NO
HAVE YOU EVER FELT YOU SHOULD CUT DOWN ON YOUR DRINKING: NO
TOTAL SCORE: 0

## 2024-04-12 ASSESSMENT — PAIN SCALES - GENERAL: PAINLEVEL_OUTOF10: 0 - NO PAIN

## 2024-04-12 ASSESSMENT — PAIN - FUNCTIONAL ASSESSMENT: PAIN_FUNCTIONAL_ASSESSMENT: 0-10

## 2024-04-12 NOTE — ED PROVIDER NOTES
"HPI   Chief Complaint   Patient presents with    Exposure to STD       This patient is a 39-year-old male with past medical history of polysubstance use disorder, schizoaffective disorder, bipolar disorder presenting today for psychiatric evaluation.  Patient initially came in through the ER with concerns of HIV.  Patient on arrival appears paranoid and delusional.  He reports that he recently got out of of rehab too soon.  Admits to cocaine and cannabis use earlier this morning.  When asked if he has any suicidal or homicidal ideations, he reports \"I have self-destructive behavior and I may harm myself but I do not want to\".  Denies any suicidal plan.  When asked about HI, he reports \"I may harm someone, I do not know\".  Denies any auditory visual hallucinations though believes that there is a \"UFO in his room\" and believes that everyone is watching him.  Denies any chest pain, shortness of breath, dysuria or hematuria.  Believes \"I got HIV from nursing home, I need to get tested\".                          Bibi Coma Scale Score: 15                     Patient History   Past Medical History:   Diagnosis Date    Aggressive behavior 07/26/2019    Cannabis use disorder 03/15/2023    Cellulitis 11/01/2021    Formatting of this note might be different from the original. Last Assessment & Plan: Formatting of this note might be different from the original. Assessment: Secondary to dog bite PLAN: - See plan for dog bite Last Assessment & Plan: Formatting of this note might be different from the original. Assessment: Secondary to dog bite PLAN: - See plan for dog bite    Electrolyte disorder 07/23/2019    Homicidal thoughts 11/04/2023    Methamphetamine intoxication (Multi) 07/24/2019    Polysubstance dependence, non-opioid, in remission (Multi) 04/15/2022    Formatting of this note might be different from the original. Dx 2014 with amphetamine, cannabis, alcohol, and hallucinogen mixed abuse/dependence Last Assessment & Plan: " Formatting of this note might be different from the original. A: active severe problem, with unclear dependency/severity. 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 Last Assessment & Plan: Formatting of this note might be different from the original. Assessment: - Chronically elevated platelet count >400k since 9/30/2021 - currently not on any medications PLAN: - start aspirin 81 mg daily Last Assessment & Plan: Formatting of this note might be different from the original. Assessment: - Chronically     No past surgical history on file.  No family history on file.  Social History     Tobacco Use    Smoking status: Every Day     Current packs/day: 1.00     Average packs/day: 1 pack/day for 20.0 years (20.0 ttl pk-yrs)     Types: Cigarettes    Smokeless tobacco: Never   Substance Use Topics    Alcohol use: Yes    Drug use: Yes     Types: Methamphetamines       Physical Exam   ED Triage Vitals [04/12/24 1525]   Temperature Heart Rate Respirations BP   35.6 °C (96.1 °F) (!) 108 16 (!) 169/117      Pulse Ox Temp Source Heart Rate Source Patient Position   97 % Temporal -- --      BP Location FiO2 (%)     -- --       Physical Exam  Vitals and nursing note reviewed.   Constitutional:       General: He is not in acute distress.     Appearance: Normal appearance. He is not ill-appearing.   HENT:      Head: Normocephalic and atraumatic.      Right Ear: External ear normal.      Left Ear: External ear normal.      Nose: Nose normal. No congestion or rhinorrhea.      Mouth/Throat:      Mouth: Mucous membranes are moist.      Pharynx: Oropharynx is clear. No oropharyngeal exudate or posterior oropharyngeal erythema.   Eyes:      Extraocular Movements:  Extraocular movements intact.      Conjunctiva/sclera: Conjunctivae normal.      Pupils: Pupils are equal, round, and reactive to light.   Cardiovascular:      Rate and Rhythm: Normal rate and regular rhythm.      Heart sounds: Normal heart sounds.   Pulmonary:      Effort: No accessory muscle usage or respiratory distress.      Breath sounds: Normal breath sounds. No wheezing, rhonchi or rales.   Abdominal:      General: Abdomen is flat. Bowel sounds are normal. There is no distension.      Palpations: Abdomen is soft.      Tenderness: There is no abdominal tenderness. There is no right CVA tenderness or left CVA tenderness.   Musculoskeletal:         General: No swelling or deformity. Normal range of motion.      Cervical back: Normal range of motion and neck supple.      Right lower leg: No edema.      Left lower leg: No edema.   Skin:     General: Skin is warm and dry.      Capillary Refill: Capillary refill takes less than 2 seconds.   Neurological:      General: No focal deficit present.      Mental Status: He is alert and oriented to person, place, and time.      GCS: GCS eye subscore is 4. GCS verbal subscore is 5. GCS motor subscore is 6.      Cranial Nerves: Cranial nerves 2-12 are intact.      Sensory: No sensory deficit.      Motor: Motor function is intact. No weakness.   Psychiatric:         Attention and Perception: He perceives visual hallucinations.         Mood and Affect: Mood normal. Affect is inappropriate.         Speech: Speech is rapid and pressured and tangential.         Behavior: Behavior is agitated and hyperactive. Behavior is not aggressive or combative. Behavior is cooperative.         Thought Content: Thought content is paranoid. Thought content does not include homicidal or suicidal plan.         Judgment: Judgment is impulsive.         ED Course & MDM   ED Course as of 04/12/24 2146   Fri Apr 12, 2024   9283 Patient requested benadryl, ativan, and haldol. Patient was given meds PO.   "[ML]   1902 Drug Screen, Urine(!)  Positive for amphetamines, cocaine and fentanyl  [ML]   1903 Urinalysis Microscopic(!)  No signs of infection. Trace hematuria but no flank pain to be concerned for stone [ML]   1903 CBC and Auto Differential(!)  No leukocytosis or acute anemia [ML]   1912 Acute Toxicology Panel, Blood  negative [ML]   1918 Creatine Kinase(!): 2,695  Will start IV fluids [ML]   1940 HIV 1/2 Antigen/Antibody Screen with Reflex to Confirmation: Nonreactive  negative [ML]   2131 Electrocardiogram, 12-lead  EKG shows a tachycardic rate and normal rhythm, normal axis, normal intervals. And normal ST and T wave pattern with no evidence of acute ischemia or other acute findings.  115 bpm.  QTc 481.  No acute changes as compared to previous EKG [ML]      ED Course User Index  [ML] Alix Hutchins PA-C         Diagnoses as of 04/12/24 2146   Non-traumatic rhabdomyolysis   Delusions (Multi)   Cocaine use       Medical Decision Making  39-year-old male presenting today for delusions and paranoia.  Initially came in for STD evaluation after being concerned of odell HIV while in nursing home.  During my initial evaluation, patient's speech was tangential and pressured.  Reports that everyone is \"watching him\".  Has visual hallucinations of \"you have fell in his room\".  Does endorse cocaine, methamphetamine and fentanyl use a few hours prior to arrival.  Denies any chest pain or shortness of breath.  EKG shows no signs of ischemia, though he is mildly tachycardic.  On arrival, patient denied any SI but endorsing HI of no one specifically.  He requested Ativan, Benadryl and Haldol which was given to him orally.  He did not need any IM medication intervention or has been aggressive in any way.  Considering this patient was concern for STDs though he denies any dysuria or penile discharge, did order G/C and trichomonas labs as well as HIV and syphilis.  Drug screen came back positive for methamphetamine, cocaine and " fentanyl.  CBC within normal limits though he does have an elevated CK of 2600 and a mild increase in creatinine at 1.35.  Has been given 2 L of IV fluids.  At this time, I am unable to medically clear him considering his cocaine induced rhabdomyolysis.  Will admit to medicine and have inpatient psychiatry evaluate him for possible psychiatric admission once medical problems are clear.        Procedure  Procedures       Alix Hutchins PA-C  04/12/24 2150    ------------------------------------    This patient was seen by the advanced practice provider. I have seen and examined the patient, agree with the workup, evaluation, management and diagnosis. The care plan has been discussed and I concur.    My assessment reveals the following:    HPI:  Patient is a 38 y/o male brought from [facility] to Room 27 for psych evaluation. Patient has h/o bipolar disorder, schizophrenia/schizoaffective disorder, and personality disorder. Patient is very tangential. Initially here for STDs. States he has high risk sexual activity. Concerned about odell HIV while in snf. Does reports some HI and VH, but the VH is more like delusions and paranoia. Patient is unable to provide a reliable history. No F/C/CP/SOB/N/V/abd pain.     Limitations to History: Tangential thinking    PE:  Vital signs reviewed in nursing triage note, EMR flowsheets, and at patient's bedside  GEN: Patient is awake, alert, calm, cooperative, and in mild to moderate psych distress.  HEAD: Normocephalic and atraumatic.  EYES: Anicteric sclera.  MOUTH: Mucous membranes moist.  CV: Regular rhythm. Mildly tachycardic. (+) s1/s2. No murmurs/rubs/gallops.  PULM: CTAB. No wheezes, rales, or crackles.  GI: Soft, non-tender, non-distended without rebound or guarding.  EXT: No deformities noted.   NEURO: Moves all extremities.   SKIN: Warm, dry. No erythema or ecchymosis.    Labs Reviewed   CBC WITH AUTO DIFFERENTIAL - Abnormal       Result Value    WBC 9.3      nRBC  0.0      RBC 5.04      Hemoglobin 13.3 (*)     Hematocrit 39.6 (*)     MCV 79 (*)     MCH 26.4      MCHC 33.6      RDW 13.4      Platelets 424      Neutrophils % 54.7      Immature Granulocytes %, Automated 0.3      Lymphocytes % 32.7      Monocytes % 10.2      Eosinophils % 1.3      Basophils % 0.8      Neutrophils Absolute 5.08      Immature Granulocytes Absolute, Automated 0.03      Lymphocytes Absolute 3.04      Monocytes Absolute 0.95      Eosinophils Absolute 0.12      Basophils Absolute 0.07     COMPREHENSIVE METABOLIC PANEL - Abnormal    Glucose 99      Sodium 140      Potassium 3.9      Chloride 101      Bicarbonate 27      Anion Gap 16      Urea Nitrogen 24 (*)     Creatinine 1.35 (*)     eGFR 69      Calcium 10.4      Albumin 4.9      Alkaline Phosphatase 77      Total Protein 9.3 (*)     AST 64 (*)     Bilirubin, Total 1.1      ALT 49     DRUG SCREEN,URINE - Abnormal    Amphetamine Screen, Urine Presumptive Positive (*)     Barbiturate Screen, Urine Presumptive Negative      Benzodiazepines Screen, Urine Presumptive Negative      Cannabinoid Screen, Urine Presumptive Negative      Cocaine Metabolite Screen, Urine Presumptive Positive (*)     Fentanyl Screen, Urine Presumptive Positive (*)     Opiate Screen, Urine Presumptive Negative      Oxycodone Screen, Urine Presumptive Negative      PCP Screen, Urine Presumptive Negative      Methadone Screen, Urine Presumptive Negative      Narrative:     Drug screen results are presumptive and should not be used to assess   compliance with prescribed medication. Contact the performing [facility] laboratory   to add-on definitive confirmatory testing if clinically indicated.    Toxicology screening results are reported qualitatively. The concentration must   be greater than or equal to the cutoff to be reported as positive. The concentration   at which the screening test can detect an individual drug or metabolite varies.   The absence of expected drug(s) and/or drug  metabolite(s) may indicate non-compliance,   inappropriate timing of specimen collection relative to drug administration, poor drug   absorption, diluted/adulterated urine, or limitations of testing. For medical purposes   only; not valid for forensic use.    Interpretive questions should be directed to the laboratory medical directors.   URINALYSIS WITH REFLEX CULTURE AND MICROSCOPIC - Abnormal    Color, Urine Yellow      Appearance, Urine Clear      Specific Gravity, Urine 1.037 (*)     pH, Urine 5.5      Protein, Urine 30 (1+) (*)     Glucose, Urine Normal      Blood, Urine 1.0 (3+) (*)     Ketones, Urine NEGATIVE      Bilirubin, Urine NEGATIVE      Urobilinogen, Urine Normal      Nitrite, Urine NEGATIVE      Leukocyte Esterase, Urine NEGATIVE     CREATINE KINASE - Abnormal    Creatine Kinase 2,695 (*)    TRICH VAGINALIS, AMPLIFIED - Abnormal    Trichomonas Vaginalis Positive (*)     Narrative:     The APTIMA Trichomonas vaginalis assay is FDA-approved for  testing on female endocervical swabs, vaginal swabs, and  ThinPrep liquid pap samples. Performance characteristics  for Trichomonas vaginalis on specific non-FDA-approved  sample types (female and male urine and male urethral  swabs) have been validated by [facility]. This laboratory is certified by  CLIA to perform high complexity testing. Samples from all  other sites are not validated for this method.   COMPREHENSIVE METABOLIC PANEL - Abnormal    Glucose 103 (*)     Sodium 140      Potassium 4.1      Chloride 105      Bicarbonate 28      Anion Gap 11      Urea Nitrogen 17      Creatinine 0.98      eGFR >90      Calcium 8.8      Albumin 3.6      Alkaline Phosphatase 59      Total Protein 6.5      AST 48 (*)     Bilirubin, Total 0.7      ALT 41     CREATINE KINASE - Abnormal    Creatine Kinase 1,623 (*)    CREATINE KINASE - Abnormal    Creatine Kinase 1,344 (*)    URINALYSIS MICROSCOPIC WITH REFLEX CULTURE - Abnormal    WBC,  Urine 1-5      RBC, Urine 6-10 (*)     Mucus, Urine FEW     ACUTE TOXICOLOGY PANEL, BLOOD - Normal    Acetaminophen <10.0      Salicylate  <3      Alcohol <10     HIV 1/2 ANTIGEN/ANTIBODY SCREEN WIH REFLEX TO CONFIRMATION - Normal    HIV 1/2 Antigen/Antibody Screen with Reflex to Confirmation Nonreactive      Narrative:     HIV Ag/Ab screen is performed using the Siemens Samba TV HIV Ag/Ab Combo assay which detects the presence of HIV p24 antigen as well as antibodies to HIV-1 (Group M and O) and HIV-2.  No laboratory evidence of HIV infection. If acute HIV infection is suspected, consider testing for HIV RNA by PCR (viral load).   C. TRACHOMATIS + N. GONORRHOEAE, AMPLIFIED - Normal    Neisseria gonorrhea,Amplified Negative      Chlamydia trachomatis, Amplified Negative      Narrative:     The APTIMA Combo 2 assay is FDA-approved NAAT using target capture for the in vitro qualitative detection and differentiation of ribosomal RNA (rRNA) for Chlamydia trachomatis and Neisseria gonorrhoeae testing on clinician-collected endocervical, PreservCyt solution liquid Pap specimens, vaginal, throat, rectal, and male urethral swab specimens; patient-collected vaginal swab specimens, and female and male urine specimens from symptomatic and asymptomatic individuals. Samples from all other sites are not validated for this method.   SYPHILIS SCREENING WITH REFLEX - Normal    Syphilis Total Ab Nonreactive     URINALYSIS WITH REFLEX CULTURE AND MICROSCOPIC    Narrative:     The following orders were created for panel order Urinalysis with Reflex Culture and Microscopic.  Procedure                               Abnormality         Status                     ---------                               -----------         ------                     Urinalysis with Reflex C...[[id number]]  Abnormal            Final result               Extra Urine Gray Tube[[id number]]                            Final result                 Please view  results for these tests on the individual orders.   EXTRA URINE GRAY TUBE    Extra Tube Hold for add-ons.           Medical Decision Making:  - Elopement precautions  - Medical clearance for EPAT  - CK elevated close to 3000  - Unable to medically clear for EPAT.  - Will need IVF and admission for rhabdo with inpatient psych consult.     EKG: EKG independently reviewed by the attending ED physician, and I and concur with the resident's/advanced practice provider's interpretation. Sinus rhythm at 115 bpm, normal axis, normal intervals, no ST or T wave changes. Similar to old EKG on 12/22/23.    Differential Diagnoses Considered: Intoxication, Delusions, HI, Paranoia, VH.     Chronic Medical Conditions Significantly Affecting Care: Bipolar disorder, schizophrenia/schizoaffective disorder, and personality disorder    Escalation of Care:  Appropriate for inpatient management    MD David Hsieh MD  04/13/24 0624

## 2024-04-13 LAB
ALBUMIN SERPL BCP-MCNC: 3.6 G/DL (ref 3.4–5)
ALP SERPL-CCNC: 59 U/L (ref 33–120)
ALT SERPL W P-5'-P-CCNC: 41 U/L (ref 10–52)
ANION GAP SERPL CALC-SCNC: 11 MMOL/L (ref 10–20)
AST SERPL W P-5'-P-CCNC: 48 U/L (ref 9–39)
ATRIAL RATE: 115 BPM
BILIRUB SERPL-MCNC: 0.7 MG/DL (ref 0–1.2)
BUN SERPL-MCNC: 17 MG/DL (ref 6–23)
C TRACH RRNA SPEC QL NAA+PROBE: NEGATIVE
CALCIUM SERPL-MCNC: 8.8 MG/DL (ref 8.6–10.6)
CHLORIDE SERPL-SCNC: 105 MMOL/L (ref 98–107)
CK SERPL-CCNC: 1344 U/L (ref 0–325)
CK SERPL-CCNC: 1623 U/L (ref 0–325)
CO2 SERPL-SCNC: 28 MMOL/L (ref 21–32)
CREAT SERPL-MCNC: 0.98 MG/DL (ref 0.5–1.3)
EGFRCR SERPLBLD CKD-EPI 2021: >90 ML/MIN/1.73M*2
GLUCOSE SERPL-MCNC: 103 MG/DL (ref 74–99)
HOLD SPECIMEN: NORMAL
N GONORRHOEA DNA SPEC QL PROBE+SIG AMP: NEGATIVE
P AXIS: 53 DEGREES
P OFFSET: 214 MS
P ONSET: 157 MS
POTASSIUM SERPL-SCNC: 4.1 MMOL/L (ref 3.5–5.3)
PR INTERVAL: 126 MS
PROT SERPL-MCNC: 6.5 G/DL (ref 6.4–8.2)
Q ONSET: 220 MS
QRS COUNT: 18 BEATS
QRS DURATION: 76 MS
QT INTERVAL: 348 MS
QTC CALCULATION(BAZETT): 481 MS
QTC FREDERICIA: 432 MS
R AXIS: 68 DEGREES
SODIUM SERPL-SCNC: 140 MMOL/L (ref 136–145)
T AXIS: 54 DEGREES
T OFFSET: 394 MS
T VAGINALIS RRNA SPEC QL NAA+PROBE: POSITIVE
VENTRICULAR RATE: 115 BPM

## 2024-04-13 PROCEDURE — 2500000001 HC RX 250 WO HCPCS SELF ADMINISTERED DRUGS (ALT 637 FOR MEDICARE OP): Performed by: STUDENT IN AN ORGANIZED HEALTH CARE EDUCATION/TRAINING PROGRAM

## 2024-04-13 PROCEDURE — 82550 ASSAY OF CK (CPK): CPT | Performed by: STUDENT IN AN ORGANIZED HEALTH CARE EDUCATION/TRAINING PROGRAM

## 2024-04-13 PROCEDURE — 2500000004 HC RX 250 GENERAL PHARMACY W/ HCPCS (ALT 636 FOR OP/ED): Performed by: STUDENT IN AN ORGANIZED HEALTH CARE EDUCATION/TRAINING PROGRAM

## 2024-04-13 PROCEDURE — 2500000002 HC RX 250 W HCPCS SELF ADMINISTERED DRUGS (ALT 637 FOR MEDICARE OP, ALT 636 FOR OP/ED): Performed by: STUDENT IN AN ORGANIZED HEALTH CARE EDUCATION/TRAINING PROGRAM

## 2024-04-13 PROCEDURE — 36415 COLL VENOUS BLD VENIPUNCTURE: CPT | Performed by: STUDENT IN AN ORGANIZED HEALTH CARE EDUCATION/TRAINING PROGRAM

## 2024-04-13 PROCEDURE — 99233 SBSQ HOSP IP/OBS HIGH 50: CPT | Performed by: STUDENT IN AN ORGANIZED HEALTH CARE EDUCATION/TRAINING PROGRAM

## 2024-04-13 PROCEDURE — 2500000002 HC RX 250 W HCPCS SELF ADMINISTERED DRUGS (ALT 637 FOR MEDICARE OP, ALT 636 FOR OP/ED): Mod: MUE | Performed by: STUDENT IN AN ORGANIZED HEALTH CARE EDUCATION/TRAINING PROGRAM

## 2024-04-13 PROCEDURE — 1100000001 HC PRIVATE ROOM DAILY

## 2024-04-13 PROCEDURE — 80053 COMPREHEN METABOLIC PANEL: CPT | Performed by: STUDENT IN AN ORGANIZED HEALTH CARE EDUCATION/TRAINING PROGRAM

## 2024-04-13 PROCEDURE — 99222 1ST HOSP IP/OBS MODERATE 55: CPT | Performed by: STUDENT IN AN ORGANIZED HEALTH CARE EDUCATION/TRAINING PROGRAM

## 2024-04-13 RX ORDER — SERTRALINE HYDROCHLORIDE 50 MG/1
50 TABLET, FILM COATED ORAL
COMMUNITY
Start: 2024-04-03 | End: 2025-04-03

## 2024-04-13 RX ORDER — ACETAMINOPHEN 500 MG
5 TABLET ORAL NIGHTLY PRN
Status: DISCONTINUED | OUTPATIENT
Start: 2024-04-13 | End: 2024-04-15 | Stop reason: HOSPADM

## 2024-04-13 RX ORDER — ACETAMINOPHEN 500 MG
1000 TABLET ORAL EVERY 6 HOURS PRN
COMMUNITY
Start: 2024-02-01

## 2024-04-13 RX ORDER — METRONIDAZOLE 500 MG/1
500 TABLET ORAL EVERY 12 HOURS SCHEDULED
Status: DISCONTINUED | OUTPATIENT
Start: 2024-04-13 | End: 2024-04-15 | Stop reason: HOSPADM

## 2024-04-13 RX ADMIN — OLANZAPINE 15 MG: 5 TABLET, FILM COATED ORAL at 22:44

## 2024-04-13 RX ADMIN — METRONIDAZOLE 500 MG: 500 TABLET ORAL at 12:26

## 2024-04-13 RX ADMIN — METRONIDAZOLE 500 MG: 500 TABLET ORAL at 22:44

## 2024-04-13 RX ADMIN — SODIUM CHLORIDE, POTASSIUM CHLORIDE, SODIUM LACTATE AND CALCIUM CHLORIDE 250 ML/HR: 600; 310; 30; 20 INJECTION, SOLUTION INTRAVENOUS at 22:44

## 2024-04-13 RX ADMIN — Medication 5 MG: at 01:27

## 2024-04-13 SDOH — SOCIAL STABILITY: SOCIAL INSECURITY: HAS ANYONE EVER THREATENED TO HURT YOUR FAMILY OR YOUR PETS?: NO

## 2024-04-13 SDOH — SOCIAL STABILITY: SOCIAL INSECURITY: WERE YOU ABLE TO COMPLETE ALL THE BEHAVIORAL HEALTH SCREENINGS?: NO

## 2024-04-13 SDOH — SOCIAL STABILITY: SOCIAL INSECURITY: ARE THERE ANY APPARENT SIGNS OF INJURIES/BEHAVIORS THAT COULD BE RELATED TO ABUSE/NEGLECT?: NO

## 2024-04-13 SDOH — SOCIAL STABILITY: SOCIAL INSECURITY: DOES ANYONE TRY TO KEEP YOU FROM HAVING/CONTACTING OTHER FRIENDS OR DOING THINGS OUTSIDE YOUR HOME?: NO

## 2024-04-13 SDOH — SOCIAL STABILITY: SOCIAL INSECURITY: DO YOU FEEL UNSAFE GOING BACK TO THE PLACE WHERE YOU ARE LIVING?: NO

## 2024-04-13 SDOH — SOCIAL STABILITY: SOCIAL INSECURITY: DO YOU FEEL ANYONE HAS EXPLOITED OR TAKEN ADVANTAGE OF YOU FINANCIALLY OR OF YOUR PERSONAL PROPERTY?: NO

## 2024-04-13 SDOH — SOCIAL STABILITY: SOCIAL INSECURITY: ARE YOU OR HAVE YOU BEEN THREATENED OR ABUSED PHYSICALLY, EMOTIONALLY, OR SEXUALLY BY ANYONE?: NO

## 2024-04-13 SDOH — SOCIAL STABILITY: SOCIAL INSECURITY: ABUSE: ADULT

## 2024-04-13 SDOH — SOCIAL STABILITY: SOCIAL INSECURITY: HAVE YOU HAD THOUGHTS OF HARMING ANYONE ELSE?: NO

## 2024-04-13 ASSESSMENT — ACTIVITIES OF DAILY LIVING (ADL)
TOILETING: INDEPENDENT
BATHING: INDEPENDENT
JUDGMENT_ADEQUATE_SAFELY_COMPLETE_DAILY_ACTIVITIES: YES
HEARING - RIGHT EAR: FUNCTIONAL
FEEDING YOURSELF: INDEPENDENT
WALKS IN HOME: INDEPENDENT
HEARING - LEFT EAR: FUNCTIONAL
PATIENT'S MEMORY ADEQUATE TO SAFELY COMPLETE DAILY ACTIVITIES?: YES
ADEQUATE_TO_COMPLETE_ADL: UNABLE TO ASSESS
GROOMING: INDEPENDENT
ADEQUATE_TO_COMPLETE_ADL: YES
DRESSING YOURSELF: INDEPENDENT

## 2024-04-13 ASSESSMENT — PAIN - FUNCTIONAL ASSESSMENT: PAIN_FUNCTIONAL_ASSESSMENT: 0-10

## 2024-04-13 ASSESSMENT — COGNITIVE AND FUNCTIONAL STATUS - GENERAL
PATIENT BASELINE BEDBOUND: UNABLE TO ASSESS AT THIS TIME
DAILY ACTIVITIY SCORE: 24
MOBILITY SCORE: 24
MOBILITY SCORE: 24

## 2024-04-13 ASSESSMENT — PATIENT HEALTH QUESTIONNAIRE - PHQ9
1. LITTLE INTEREST OR PLEASURE IN DOING THINGS: NOT AT ALL
2. FEELING DOWN, DEPRESSED OR HOPELESS: NOT AT ALL
SUM OF ALL RESPONSES TO PHQ9 QUESTIONS 1 & 2: 0

## 2024-04-13 ASSESSMENT — PAIN SCALES - GENERAL: PAINLEVEL_OUTOF10: 0 - NO PAIN

## 2024-04-13 ASSESSMENT — LIFESTYLE VARIABLES
AUDIT-C TOTAL SCORE: 2
PRESCIPTION_ABUSE_PAST_12_MONTHS: NO
AUDIT-C TOTAL SCORE: 2
SKIP TO QUESTIONS 9-10: 0
HOW OFTEN DO YOU HAVE 6 OR MORE DRINKS ON ONE OCCASION: LESS THAN MONTHLY
HOW OFTEN DO YOU HAVE A DRINK CONTAINING ALCOHOL: MONTHLY OR LESS
HOW MANY STANDARD DRINKS CONTAINING ALCOHOL DO YOU HAVE ON A TYPICAL DAY: 1 OR 2
SUBSTANCE_ABUSE_PAST_12_MONTHS: NO

## 2024-04-13 NOTE — PROGRESS NOTES
Pharmacy Medication History Review    Nathan Vaughn is a 39 y.o. male admitted for Non-traumatic rhabdomyolysis. Pharmacy reviewed the patient's [id number] medications and allergies for accuracy.    The list below reflects the updated PTA list. Comments regarding how patient may be taking medications differently can be found in the Admit Orders Activity  Prior to Admission Medications   Prescriptions Last Dose Informant   OLANZapine (ZyPREXA) 5 mg tablet Past Month at patient request refills Self   Sig: Take 1 tablet (5 mg) by mouth once daily at bedtime.   acetaminophen (Tylenol) 500 mg tablet Past Month at patient request refills Self   Sig: Take 2 tablets (1,000 mg) by mouth every 6 hours if needed.   sertraline (Zoloft) 50 mg tablet Past Week at patient request refills Self   Sig: Take 1 tablet (50 mg) by mouth once daily.      Facility-Administered Medications: None      The list below reflects the updated allergy list. Please review each documented allergy for additional clarification and justification.  Allergies  Reviewed by Elmer Stevenson on 4/13/2024   No Known Allergies         Patient declines M2B at discharge. Pharmacy has been updated to n/a.    Sources used to complete the med history include   Allergy list epic   Epic dispense history  [facility] ( none )  4/12/2024 [facility] clinical summary   4/12/2024 [facility] clinical summary      Below are additional concerns with the patient's PTA list.  Patient is a moderate historian - patient knows medication name and frequency but not the doses - patient request refills on all medications   Per the 4/12/2024 [facility] clinical summary patient is to stop taking fluoxetine 40 mg 1 tab once daily and to start taking sertraline 50 mg 1 tab once daily.       Elmer Stevenson [facility]  Transitions of Care Pharmacy Technician  [facility]  Please reach out via [employer] Secure Chat for questions, or if no response call [phone] or  [email or web address] “[email or web address]”

## 2024-04-13 NOTE — CONSULTS
"Consults     HISTORY OF PRESENT ILLNESS:  Nathan Vaughn is a 39 y.o. male with a past psychiatric history of schizoaffective disorder bipolar type, multiple substance use disorders (PCP, cannabis, methamphetamine, cocaine, opiates) and a past medical history of rhabdomyolysis secondary to drug intoxication who was admitted to [facility] on 4/12/24 for rhabdomyolysis. Psychiatry was consulted on 4/12/24 for delusions, paranoia, and dangerousness to self.    Chart Review  The patient reported \"I have self-destructive behavior and I may harm myself but I do not want to\" as well as \"I may harm someone, I do not know\" when asked by primary team about suicidal and homicidal ideation. He also noted there was a \"UFO in his room\" and endorsed idea he was being watched.     Labs notable for CPK 2695, Cr 1.35, AST/ALT 48/41, with UDS +amphetamine, +cannabinoids, +cocaine at admission. EKG on 4/12/24 showed Qtc Phan 432ms at 115bmp.      Psychiatry consult from 11/05/2023 under similar conditions (rhabdo, substance use, paranoia, suicidal, +auditory hallucinations) indicate he was not adherent to prescribed olanzapine and fluoxetine. UDS +amphetamine & +cannabis at that time.     Last known fill of fluoxetine 30d prescription in December 2023. Last known fill of olanzapine 14d prescription in December 2023.     On interview, he said that he was hospitalized at inpatient rehab until ~1w ago at which point he left (~43d duration treatment) due to conflict with others. He says he came to [facility] to stay with a friend but started using drugs to deal with the stress of everyone looking at him and judging him on the bus. Initially he said he was interested in going to [facility] because they have multiple resources and he wanted to restart rehab. He requested that I not contact his emergency contacts and did not provide any numbers for collateral.     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 He called his brother on speakerphone for me to speak with him, but his brother did not .     Asked about what brought him into the hospital he said \"I was intoxicated\" and explained he had come in to get tested for sexually transmitted infections because he engaged in high risk sexual activity while intoxicated. He reports he has been taking olanzapine 10mg po at bedtime since starting rehab in [place]   ~7-8wks ago. He reported he has not seen an outpatient psychiatrist in >1yr, instead using ED resources and rehab centers for his psychiatric care. He noted that olanzapine was helpful with \"calm stable sleep diet, adequate\" and sertraline helped with \"little energy helps with depression.\"     PSYCHIATRIC REVIEW OF SYSTEMS  Depression: negative  Anxiety: negative  Gely: negative  Psychosis: disorganized speech, disorganized thought , disorganized behavior, and loosening of associations  Delirium: negative   Trauma: negative    PSYCHIATRIC HISTORY  Prior diagnoses: schizoafective disorder, bipolar type; polysusbstance use (PCP, meth, cocaine, cannabis, opiates)  Prior hospitalizations: Multiple (>15), most recently   History of suicide attempts: 2016 by cutting  History of self-harm: +cutting  History of violence: pt denied    Current psychiatrist: none, was seeing PMHNP Carrol Mccord at [facility] >1y ago  Current mental health agency: none  Current : none  Current outpatient treatment: none  Guardian or payee: none    Current psychiatric medications: nonadherent  Past psychiatric medications: Depakote, Zyprexa, Latuda, Geodon, Haldol, Ativan, Abilify (restless), Prozac    Family psychiatric history:      - Psychiatric disorders: paternal schizoaffective disorder, maternal depression     - Suicide: aunt     - " Substance use: multiple relatives    SUBSTANCE USE HISTORY   He reports that he has been smoking cigarettes. He has a 20 pack-year smoking history. He has never used smokeless tobacco. He reports current alcohol use. He reports current drug use. Drug: Methamphetamines.    Tobacco: 20 pack year hx  Alcohol: occasional     - History of severe withdrawal: denies     - Last use: a couple beers in the past week, none in >48h  Other substances: cannabis, methamphetamine, PCP, opiates, cocaine     - Last use: intoxicated on admission  Prior substance use disorder treatment: multiple inpatient and outpatient treatments, court ordered and independently sought.     SOCIAL HISTORY  Social History     Socioeconomic History    Marital status: Single     Spouse name: Not on file    Number of children: Not on file    Years of education: Not on file    Highest education level: Not on file   Occupational History    Not on file   Tobacco Use    Smoking status: Every Day     Current packs/day: 1.00     Average packs/day: 1 pack/day for 20.0 years (20.0 ttl pk-yrs)     Types: Cigarettes    Smokeless tobacco: Never   Substance and Sexual Activity    Alcohol use: Yes    Drug use: Yes     Types: Methamphetamines    Sexual activity: Not on file   Other Topics Concern    Not on file   Social History Narrative    Not on file     Social Determinants of Health     Financial Resource Strain: Patient Declined (4/3/2024)    Received from [facility]    Overall Financial Resource Strain (CARDIA)     Difficulty of Paying Living Expenses: Patient declined   Recent Concern: Financial Resource Strain - Medium Risk (2/1/2024)    Received from [facility]    Overall Financial Resource Strain (CARDIA)     Difficulty of Paying Living Expenses: Somewhat hard   Food Insecurity: Patient Declined (4/3/2024)    Received from [facility]    Hunger Vital Sign     Worried About Running Out of Food in the Last Year: Patient declined     Ran Out of Food in  the Last Year: Patient declined   Transportation Needs: Patient Declined (4/3/2024)    Received from [facility]    PRAPARE - Transportation     Lack of Transportation (Medical): Patient declined     Lack of Transportation (Non-Medical): Patient declined   Physical Activity: Patient Declined (4/3/2024)    Received from [facility]    Exercise Vital Sign     Days of Exercise per Week: Patient declined     Minutes of Exercise per Session: Patient declined   Stress: Patient Declined (4/3/2024)    Received from [facility]    [employer] - Occupational Stress Questionnaire     Feeling of Stress : Patient declined   Social Connections: Patient Declined (4/3/2024)    Received from [facility]    Social Connection and Isolation Panel [NHANES]     Frequency of Communication with Friends and Family: Patient declined     Frequency of Social Gatherings with Friends and Family: Patient declined     Attends Scientologist Services: Patient declined     Active Member of Clubs or Organizations: Patient declined     Attends Club or Organization Meetings: Patient declined     Marital Status: Patient declined   Intimate Partner Violence: Patient Declined (4/3/2024)    Received from [facility]    Humiliation, Afraid, Rape, and Kick questionnaire     Fear of Current or Ex-Partner: Patient declined     Emotionally Abused: Patient declined     Physically Abused: Patient declined     Sexually Abused: Patient declined   Housing Stability: Patient Declined (4/3/2024)    Received from [facility]    Housing Stability Vital Sign     Unable to Pay for Housing in the Last Year: Patient declined     Number of Places Lived in the Last Year: 5     Unstable Housing in the Last Year: Patient declined   Recent Concern: Housing Stability - High Risk (2/1/2024)    Received from [facility]    Housing Stability Vital Sign     Unable to Pay for Housing in the Last Year: Yes     Number of Places Lived in the Last Year:  Not on file     Unstable Housing in the Last Year: Yes      Current living situation: reports he has been going from rehab to rehab for >1yr. Says he has a key to stay with his godfather in [facility] but would not provide contact information for collateral to confirm.   Current employment/source of income: SSDI  Current stressors: left rehab, returned to use  Born [place] raised[place] by grandmother  Childhood: no h/o trauma or abuse  Education: GED following non-completion of HS  Employment: none  Marital status: never  Children: none  Legal history: Incarcerated 15y-19y for felonous assault; multiple similar incercerations   history: none  Access to weapons: denies    PAST MEDICAL HISTORY  Past Medical History:   Diagnosis Date    Aggressive behavior 07/26/2019    Cannabis use disorder 03/15/2023    Cellulitis 11/01/2021    Formatting of this note might be different from the original. Last Assessment & Plan: Formatting of this note might be different from the original. Assessment: Secondary to dog bite PLAN: - See plan for dog bite Last Assessment & Plan: Formatting of this note might be different from the original. Assessment: Secondary to dog bite PLAN: - See plan for dog bite    Electrolyte disorder 07/23/2019    Homicidal thoughts 11/04/2023    Methamphetamine intoxication (Multi) 07/24/2019    Polysubstance dependence, non-opioid, in remission (Multi) 04/15/2022    Formatting of this note might be different from the original. Dx 2014 with amphetamine, cannabis, alcohol, and hallucinogen mixed abuse/dependence Last Assessment & Plan: Formatting of this note might be different from the original. A: active severe problem, with unclear dependency/severity. Prior hx of dependency on various substances at different times, so primary substance is not easily identif    Renal disease 07/23/2019    Stimulant use disorder 03/15/2023    Substance-induced psychotic disorder (Multi) 03/13/2023    Thrombocytosis  "11/04/2021    Formatting of this note might be different from the original. Last Assessment & Plan: Formatting of this note might be different from the original. Assessment: - Chronically elevated platelet count >400k since 9/30/2021 - currently not on any medications PLAN: - start aspirin 81 mg daily Last Assessment & Plan: Formatting of this note might be different from the original. Assessment: - Chronically     PAST SURGICAL HISTORY  No past surgical history on file.     FAMILY HISTORY  No family history on file.     ALLERGIES  Patient has no known allergies.    OARRS REVIEW  OARRS checked: 4/13/24 Noah Reynoso MD  OARRS comments: n/a empty    OBJECTIVE    VITALS      12/23/2023    12:18 AM 4/12/2024     3:25 PM 4/12/2024     8:53 PM 4/12/2024    10:26 PM 4/13/2024     2:50 AM 4/13/2024     5:12 AM 4/13/2024     8:13 AM   Vitals   Systolic 159 169 98 141 93 131 96   Diastolic 105 117 62 78 62 78 58   Heart Rate 118 108 99 99 93 81 83   Temp  35.6 °C (96.1 °F) 36.4 °C (97.5 °F) 36.3 °C (97.3 °F) 36.6 °C (97.9 °F) 36.3 °C (97.3 °F) 36.5 °C (97.7 °F)   Resp 16 16 16 18 16 18 18   Height (in)  1.778 m (5' 10\")        Weight (lb)  240        BMI  34.44 kg/m2        BSA (m2)  2.32 m2             MENTAL STATUS EXAM  Appearance: appears stated age, stubble, short hair, hospital gown in hospital bed, preparing to eat breakfast  Attitude: superficially cooperative, demanding  Behavior: appropriate eye contact, while talking his IV began beeping - he promptly straightened his arm and then reached pressing the button to reset the machine. He then reached over to the handle on his breakfast tray table to lower his desktop to a comfortable eating position.   Motor Activity: no tremor, no abnormal movements, gait not observed  Speech: loud, mostly clear with some mumbling  Mood: \"I was intoxicated\"  Affect: slightly expansive as he used humor to deflect serious questions and was either guarded or demanding about more " factual or serious content during the interview.   Thought Process: some loose associations  Thought Content: felt that people on the bus were judging him, wants to start either rehab or iop, says he can stay with godfather in [facility], was using drugs prior to admission, denies any active thoughts of suicide or harm to other, no past violence by his report.   Thought Perception: says he has seen a ufo several months ago and wont elaborate but denies any recent or current perceptual disturbances. No persecutory delusions elicited.   Cognition: grossly oriented to person, place, reason for admission.   Insight: limited regarding substance use  Judgement: limited regarding substance use    MEDICAL REVIEW OF SYSTEMS  Review of Systems     HOME MEDICATIONS  Medication Documentation Review Audit       Reviewed by Elmer Stevenson (Technician) on 04/13/24 at 0541      Medication Order Taking? Sig Documenting Provider Last Dose Status   acetaminophen (Tylenol) 500 mg tablet [id number] Yes Take 2 tablets (1,000 mg) by mouth every 6 hours if needed. Historical Provider, MD Past Week Active   OLANZapine (ZyPREXA) 15 mg tablet [id number] Yes Take 1 tablet (15 mg) by mouth once daily at bedtime. Historical Provider, MD Past Week Active   sertraline (Zoloft) 50 mg tablet [id number] Yes Take 1 tablet (50 mg) by mouth once daily. Historical Provider, MD Past Week Active                  CURRENT MEDICATIONS  Scheduled medications  OLANZapine, 15 mg, oral, Nightly      Continuous medications  lactated Ringer's, 250 mL/hr, Last Rate: 100 mL/hr (04/12/24 2339)      PRN medications  PRN medications: melatonin     LABS  Results for orders placed or performed during the hospital encounter of 04/12/24 (from the past 24 hour(s))   Drug Screen, Urine   Result Value Ref Range    Amphetamine Screen, Urine Presumptive Positive (A) Presumptive Negative    Barbiturate Screen, Urine Presumptive Negative Presumptive Negative    Benzodiazepines  Screen, Urine Presumptive Negative Presumptive Negative    Cannabinoid Screen, Urine Presumptive Negative Presumptive Negative    Cocaine Metabolite Screen, Urine Presumptive Positive (A) Presumptive Negative    Fentanyl Screen, Urine Presumptive Positive (A) Presumptive Negative    Opiate Screen, Urine Presumptive Negative Presumptive Negative    Oxycodone Screen, Urine Presumptive Negative Presumptive Negative    PCP Screen, Urine Presumptive Negative Presumptive Negative    Methadone Screen, Urine Presumptive Negative Presumptive Negative   Urinalysis with Reflex Culture and Microscopic   Result Value Ref Range    Color, Urine Yellow Light-Yellow, Yellow, Dark-Yellow    Appearance, Urine Clear Clear    Specific Gravity, Urine 1.037 (N) 1.005 - 1.035    pH, Urine 5.5 5.0, 5.5, 6.0, 6.5, 7.0, 7.5, 8.0    Protein, Urine 30 (1+) (A) NEGATIVE, 10 (TRACE), 20 (TRACE) mg/dL    Glucose, Urine Normal Normal mg/dL    Blood, Urine 1.0 (3+) (A) NEGATIVE    Ketones, Urine NEGATIVE NEGATIVE mg/dL    Bilirubin, Urine NEGATIVE NEGATIVE    Urobilinogen, Urine Normal Normal mg/dL    Nitrite, Urine NEGATIVE NEGATIVE    Leukocyte Esterase, Urine NEGATIVE NEGATIVE   Extra Urine Gray Tube   Result Value Ref Range    Extra Tube Hold for add-ons.    Urinalysis Microscopic   Result Value Ref Range    WBC, Urine 1-5 1-5, NONE /HPF    RBC, Urine 6-10 (A) NONE, 1-2, 3-5 /HPF    Mucus, Urine FEW Reference range not established. /LPF   CBC and Auto Differential   Result Value Ref Range    WBC 9.3 4.4 - 11.3 x10*3/uL    nRBC 0.0 0.0 - 0.0 /100 WBCs    RBC 5.04 4.50 - 5.90 x10*6/uL    Hemoglobin 13.3 (L) 13.5 - 17.5 g/dL    Hematocrit 39.6 (L) 41.0 - 52.0 %    MCV 79 (L) 80 - 100 fL    MCH 26.4 26.0 - 34.0 pg    MCHC 33.6 32.0 - 36.0 g/dL    RDW 13.4 11.5 - 14.5 %    Platelets 424 150 - 450 x10*3/uL    Neutrophils % 54.7 40.0 - 80.0 %    Immature Granulocytes %, Automated 0.3 0.0 - 0.9 %    Lymphocytes % 32.7 13.0 - 44.0 %    Monocytes % 10.2  2.0 - 10.0 %    Eosinophils % 1.3 0.0 - 6.0 %    Basophils % 0.8 0.0 - 2.0 %    Neutrophils Absolute 5.08 1.20 - 7.70 x10*3/uL    Immature Granulocytes Absolute, Automated 0.03 0.00 - 0.70 x10*3/uL    Lymphocytes Absolute 3.04 1.20 - 4.80 x10*3/uL    Monocytes Absolute 0.95 0.10 - 1.00 x10*3/uL    Eosinophils Absolute 0.12 0.00 - 0.70 x10*3/uL    Basophils Absolute 0.07 0.00 - 0.10 x10*3/uL   Comprehensive Metabolic Panel   Result Value Ref Range    Glucose 99 74 - 99 mg/dL    Sodium 140 136 - 145 mmol/L    Potassium 3.9 3.5 - 5.3 mmol/L    Chloride 101 98 - 107 mmol/L    Bicarbonate 27 21 - 32 mmol/L    Anion Gap 16 10 - 20 mmol/L    Urea Nitrogen 24 (H) 6 - 23 mg/dL    Creatinine 1.35 (H) 0.50 - 1.30 mg/dL    eGFR 69 >60 mL/min/1.73m*2    Calcium 10.4 8.6 - 10.6 mg/dL    Albumin 4.9 3.4 - 5.0 g/dL    Alkaline Phosphatase 77 33 - 120 U/L    Total Protein 9.3 (H) 6.4 - 8.2 g/dL    AST 64 (H) 9 - 39 U/L    Bilirubin, Total 1.1 0.0 - 1.2 mg/dL    ALT 49 10 - 52 U/L   Acute Toxicology Panel, Blood   Result Value Ref Range    Acetaminophen <10.0 10.0 - 30.0 ug/mL    Salicylate  <3 4 - 20 mg/dL    Alcohol <10 <=10 mg/dL   HIV 1/2 Antigen/Antibody Screen with Reflex to Confirmation   Result Value Ref Range    HIV 1/2 Antigen/Antibody Screen with Reflex to Confirmation Nonreactive Nonreactive   Cardiac Enzymes - CPK   Result Value Ref Range    Creatine Kinase 2,695 (H) 0 - 325 U/L   Electrocardiogram, 12-lead   Result Value Ref Range    Ventricular Rate 115 BPM    Atrial Rate 115 BPM    NV Interval 126 ms    QRS Duration 76 ms    QT Interval 348 ms    QTC Calculation(Bazett) 481 ms    P Axis 53 degrees    R Axis 68 degrees    T Axis 54 degrees    QRS Count 18 beats    Q Onset 220 ms    P Onset 157 ms    P Offset 214 ms    T Offset 394 ms    QTC Fredericia 432 ms   Syphilis Screen with Reflex   Result Value Ref Range    Syphilis Total Ab Nonreactive Nonreactive   Comprehensive metabolic panel   Result Value Ref Range     Glucose 103 (H) 74 - 99 mg/dL    Sodium 140 136 - 145 mmol/L    Potassium 4.1 3.5 - 5.3 mmol/L    Chloride 105 98 - 107 mmol/L    Bicarbonate 28 21 - 32 mmol/L    Anion Gap 11 10 - 20 mmol/L    Urea Nitrogen 17 6 - 23 mg/dL    Creatinine 0.98 0.50 - 1.30 mg/dL    eGFR >90 >60 mL/min/1.73m*2    Calcium 8.8 8.6 - 10.6 mg/dL    Albumin 3.6 3.4 - 5.0 g/dL    Alkaline Phosphatase 59 33 - 120 U/L    Total Protein 6.5 6.4 - 8.2 g/dL    AST 48 (H) 9 - 39 U/L    Bilirubin, Total 0.7 0.0 - 1.2 mg/dL    ALT 41 10 - 52 U/L   Creatine Kinase   Result Value Ref Range    Creatine Kinase 1,623 (H) 0 - 325 U/L     IMAGING  Electrocardiogram, 12-lead  Result Date: 4/13/2024  Sinus tachycardia Otherwise normal ECG When compared with ECG of 22-DEC-2023 23:25, No significant change was found See ED provider note for full interpretation and clinical correlation Confirmed by Pam Castle ([id number]) on 4/13/2024 3:10:13 AM    PSYCHIATRIC RISK ASSESSMENT  Violence Risk Factors:  male, current psychiatric illness, past history of violence, substance abuse , 1st psychiatric hospitalization by age 20 , unemployed, lower socioeconomic status, violent or homicidal ideation, persecutory delusions, lack of insight, and impulsivity  Acute Risk of Harm to Others is Considered: Moderate  Suicide Risk Factors: male, prior suicide attempts , family history of completed suicide, lives alone or lack of social support, current psychiatric illness, substance abuse , mood congruent delusions, and nonadherence to medication treatment for psychosis   Protective Factors: none  Acute Risk of Harm to Self is Considered: Moderate    ASSESSMENT AND PLAN  Nathan Vaughn is a 39 y.o. male with a past psychiatric history of schizoaffective disorder bipolar type, multiple substance use disorders (PCP, cannabis, methamphetamine, cocaine, opiates) and a past medical history of rhabdomyolysis secondary to drug intoxication who was admitted to [facility] on 4/12/24 for  rhabdomyolysis. Psychiatry was consulted on 4/12/24 for delusions, paranoia, and dangerousness to self.    On initial assessment, Mr. Vaughn reported he came to the hospital intoxicated (amphetamine, cannabis, cocaine) for STI testing. While intoxicated, he commended he may harm himself or others and made bizarre statements about UFOs. On interview he was guarded, demanding, and did not provide any numbers for collateral. While he initially demanded inpatient treatment he changed his mind after speaking with a family member and then requested discharge to do IOP on his own, saying he could stay in [place]. I was unable to confirm any potential details of his discharge plan (housing, IOP). Additionally, he remained expansive, guarded, and slightly paranoid of judgement of others. Given his history of psychosis, very poor social and personal resources, and active uncontrolled substance use that exacerbate psychosis - during which he has said he may harm himself or others - he requires further evaluation and treatment on an inpatient psychiatric unit at this time.     Labs notable for CPK 2695, Cr 1.35, AST/ALT 48/41, with UDS +amphetamine, +cannabinoids, +cocaine at admission. EKG on 4/12/24 showed Qtc Phan 432ms at 115bmp.     IMPRESSION  - substance induced psychotic disorder  - schizoaffective disorder bipolar type, by history  - stimulant use disorder (cocaine, methamphetamines)  - cannabis use disorder  - PCP use disorder by history    RECOMMENDATIONS    Safety:  - Patient does currently meet criteria for inpatient psychiatric admission. Once patient is deemed medically cleared, please document in note that patient is MEDICALLY CLEARED and contact [facility] for referral at [phone], pager [phone]. Issue Application for Emergency Admission (pink slip) only after patient is accepted to an inpatient psychiatric unit and is ready to be discharged. Search “Application for Emergency Admission” under SmartText.”  -  "Patient lacks the capacity to leave AMA at this time and thus cannot leave AMA. Call [facility] if patient attempts to leave AMA.  - To evaluate decision-making capacity, recommend use of the Capacity Evaluation Tool. Search “ IP Capacity Evaluation under SmartText\" unless the patient has a legal guardian, in which case all decisions per the legal guardian.  - Patient does require a 1:1 sitter from a psychiatric perspective at this time.  - As with all hospitalized patients, would recommend delirium precautions, as below.    Medications:  - CONTINUE olanzapine 15mg po at bedtime  - START sertraline 100mg po daily  - START hydroxyzine 50mg po q6hrs prn anxiety    AGITATION RECOMMENDATIONS:  - olanzapine 5mg ODT/IM q8hrs prn for dangerous agitation (give IM only when po unable to be given)    - Discussed recommendations with primary team.  - Psychiatry will sign off.    Thank you for allowing us to participate in the care of this patient. Please page [phone] with any questions or concerns.    Patient discussed with Dr. Haynes, who agrees with above plan.    Medication Consent  Medication Consent: n/a; consult service    Noah Reynoso MD   "

## 2024-04-13 NOTE — PROGRESS NOTES
Assessment/Plan   39-year-old male with past medical history of polysubstance use disorder, schizoaffective disorder, bipolar disorder who presented for psychiatric evaluation and STD testing.  Patient reporting paranoia and delusions in the setting of cocaine and cannabis use which she reported earlier in the morning.  He wanted to get HIV testing because he felt he got it in alf.  When asked about SI/HI he said he has self-destructive behavior and could harm himself but he does not want to, denied a plan. Placed on 1:1, admitted to medicine. Hiv/syphilis neg, GC/Chlam pending. Pt is a smoker. Utox was positive for amphetamines, cocaine and fentanyl.  Labs showed EDUARDO and rhabdo CPK elevation to 2.6K and cr of 1.3.  Due to his fluid started on maintenance.    # Rhabdo secondary to substance abuse  # EDUARDO secondary to rhabdo versus dehydration versus substance toxicity  -Status post 2 L of fluids, change maintenance fluids to 250 cc an hour  -CK downtrending, will trend  -Creatinine improved 1.35 to 0.96.     #polysubstance abuse  -Leading to rhabdo as above  -Dependence, cocaine and fentanyl  -Currently stable  - SW for resources for substance use and mental health    # Schizoaffective disorder, bipolar disorder versus substance-induced psychosis  #passive SI  -Currently suspect substance-induced psychosis.  Reported history of above  -Patient admitted to self-destructive behavior and that he could harm himself but does not have a plan or intents to  -Reported to having paranoia and delusions  -Psych consulted  -Continue one-to-one sitter    # at risk for STI  # Trichomonas infection  -Patient reported that he feels he had HIV at alf  -HIV negative, syphilis negative  -Trichomonas positive, start Flagyl, counseling provided.  -Pending GC/chlamydia    Scheduled outpatient appointments in system:   No future  "appointments.  ---------------------------------------------------------------------------------------------------  Subjective   No new events.  Patient on one-to-one use, sleeping.  Denies any intentional himself.  Sleepy and goes back to sleep easily awoken.  Discussed current results, dx and management, and patient voiced understanding.  Has no new or other complaints     ---------------------------------------------------------------------------------------------------  Objective   Last Recorded Vitals  Blood pressure 96/58, pulse 83, temperature 36.5 °C (97.7 °F), temperature source Temporal, resp. rate 18, height 1.778 m (5' 10\"), weight 109 kg (240 lb), SpO2 97%.  Intake/Output last 3 Shifts:  No intake/output data recorded.    Physical Exam  Vitals and nursing note reviewed.   Constitutional:       General: He is not in acute distress.     Appearance: Normal appearance. He is not ill-appearing or toxic-appearing.      Comments: Patient is somnolent but easily arousable to voice, falls back to sleep easily   HENT:      Head: Normocephalic and atraumatic.      Mouth/Throat:      Mouth: Mucous membranes are moist.   Eyes:      General: No scleral icterus.     Extraocular Movements: Extraocular movements intact.      Conjunctiva/sclera: Conjunctivae normal.   Cardiovascular:      Rate and Rhythm: Normal rate and regular rhythm.      Heart sounds: S1 normal and S2 normal. No murmur heard.  Pulmonary:      Effort: Pulmonary effort is normal. No respiratory distress.      Breath sounds: No wheezing, rhonchi or rales.   Abdominal:      General: Bowel sounds are normal. There is no distension.      Palpations: Abdomen is soft.      Tenderness: There is no abdominal tenderness. There is no guarding or rebound.   Musculoskeletal:         General: No swelling or deformity.      Cervical back: Neck supple.   Skin:     General: Skin is warm and dry.      Findings: No rash.   Neurological:      General: No focal deficit " present.      Mental Status: He is alert. Mental status is at baseline.      Comments: Moving all extremities   Psychiatric:      Comments: Somnolent and difficult to assess at this time.  Denied having SI or hallucinations         Relevant Results  Lab Results   Component Value Date    WBC 9.3 04/12/2024    HGB 13.3 (L) 04/12/2024    HCT 39.6 (L) 04/12/2024    MCV 79 (L) 04/12/2024     04/12/2024      Lab Results   Component Value Date    GLUCOSE 103 (H) 04/13/2024    CALCIUM 8.8 04/13/2024     04/13/2024    K 4.1 04/13/2024    CO2 28 04/13/2024     04/13/2024    BUN 17 04/13/2024    CREATININE 0.98 04/13/2024     Scheduled medications  OLANZapine, 15 mg, oral, Nightly      Continuous medications  lactated Ringer's, 250 mL/hr, Last Rate: 100 mL/hr (04/12/24 4448)      PRN medications  PRN medications: melatonin    David Anderson MD       Face Mask

## 2024-04-13 NOTE — H&P
"History Of Present Illness:    39-year-old male with past medical history of polysubstance use disorder, schizoaffective disorder, bipolar disorder presenting today for psychiatric evaluation.  Patient initially came in through the ER with concerns of HIV.  Patient on arrival appears paranoid and delusional. Admits to cocaine and cannabis use earlier this morning.  When asked if he has any suicidal or homicidal ideations, he reports \"I have self-destructive behavior and I may harm myself but I do not want to\".  Denies any suicidal plan.  When asked about HI, he reports \"I may harm someone, I do not know\".  Denies any auditory visual hallucinations though believes that there is a \"UFO in his room\" and believes that everyone is watching him.  Denies any chest pain, shortness of breath, dysuria or hematuria.  Believes \"I got HIV from [facility], I need to get tested\".     At the ED labs showed rhabdo (CPK 2695 and Cr 1.35). Psych recommended medical clearance first; they will follow up with the patient as of tomorrow.    Of note patient had a similar hospitalization at [facility] back in February (presented with hallucinations/delusions and rhabdo 2ary to drug intoxication)    Past Medical History:  He has a past medical history of Aggressive behavior (07/26/2019), Cannabis use disorder (03/15/2023), Cellulitis (11/01/2021), Electrolyte disorder (07/23/2019), Homicidal thoughts (11/04/2023), Methamphetamine intoxication (Multi) (07/24/2019), Polysubstance dependence, non-opioid, in remission (Multi) (04/15/2022), Renal disease (07/23/2019), Stimulant use disorder (03/15/2023), Substance-induced psychotic disorder (Multi) (03/13/2023), and Thrombocytosis (11/04/2021).    Past Surgical History:  He has no past surgical history on file.      Social History:  He reports that he has been smoking cigarettes. He has a 20 pack-year smoking history. He has never used smokeless tobacco. He reports current alcohol use. He reports current drug " use. Drug: Methamphetamines.    Family History:  No family history on file.     Allergies:  Patient has no known allergies.    Inpatient Medications:  Scheduled medications   Medication Dose Route Frequency    OLANZapine  15 mg oral Nightly     PRN medications   Medication     Continuous Medications   Medication Dose Last Rate    lactated Ringer's  100 mL/hr       Outpatient Medications:  Current Outpatient Medications   Medication Instructions    cholecalciferol (VITAMIN D3) 1,250 mcg, oral, Every 7 days    FLUoxetine (PROZAC) 40 mg, oral, Daily    lurasidone (LATUDA) 40 mg, oral, Daily before lunch    nicotine polacrilex (NICORETTE) 4 mg, Mouth/Throat, As needed    OLANZapine (ZYPREXA) 15 mg, oral, Nightly    topiramate (TOPAMAX) 25 mg, oral, Nightly     Physical Exam  AO x3, in no distress  No JVD, supple neck  CTAB, no crackles/rales/wheezing  S1/S2 wnl, no mrg  Soft, nd, nt, +ve bs, no organomegaly  Warm and Dry extremities, +5 strength of all extremities     Assessment/Plan   39-year-old male with past medical history of polysubstance use disorder, schizoaffective disorder, bipolar disorder presenting with delusions/hallucinations, reports cocaine/methamphetamine intoxication and moderate rhabdomyolysis and EDUARDO on labs. Psychiatry to follow patient.    #Rhabdo  #EDUARDO  -s/p 2lt of LR at the ED; mivf LR @100ml/hr  -CPK and Cr in am    #Polysubstance abuse  #Schizoaffective disorder  -olanzapine 15mg   [ ] Psychiatry to see patient in am     Code Status:  Full Code  I spent 60 minutes in the professional and overall care of this patient.  Antoni Baum MD

## 2024-04-14 LAB
ALBUMIN SERPL BCP-MCNC: 3.3 G/DL (ref 3.4–5)
ANION GAP SERPL CALC-SCNC: 12 MMOL/L (ref 10–20)
BUN SERPL-MCNC: 10 MG/DL (ref 6–23)
CALCIUM SERPL-MCNC: 8.8 MG/DL (ref 8.6–10.6)
CHLORIDE SERPL-SCNC: 106 MMOL/L (ref 98–107)
CK SERPL-CCNC: 695 U/L (ref 0–325)
CO2 SERPL-SCNC: 24 MMOL/L (ref 21–32)
CREAT SERPL-MCNC: 0.87 MG/DL (ref 0.5–1.3)
EGFRCR SERPLBLD CKD-EPI 2021: >90 ML/MIN/1.73M*2
ERYTHROCYTE [DISTWIDTH] IN BLOOD BY AUTOMATED COUNT: 13.2 % (ref 11.5–14.5)
GLUCOSE SERPL-MCNC: 87 MG/DL (ref 74–99)
HCT VFR BLD AUTO: 36.7 % (ref 41–52)
HGB BLD-MCNC: 12 G/DL (ref 13.5–17.5)
MCH RBC QN AUTO: 27.3 PG (ref 26–34)
MCHC RBC AUTO-ENTMCNC: 32.7 G/DL (ref 32–36)
MCV RBC AUTO: 83 FL (ref 80–100)
NRBC BLD-RTO: 0 /100 WBCS (ref 0–0)
PHOSPHATE SERPL-MCNC: 3.8 MG/DL (ref 2.5–4.9)
PLATELET # BLD AUTO: 333 X10*3/UL (ref 150–450)
POTASSIUM SERPL-SCNC: 4.1 MMOL/L (ref 3.5–5.3)
RBC # BLD AUTO: 4.4 X10*6/UL (ref 4.5–5.9)
SARS-COV-2 RNA RESP QL NAA+PROBE: NOT DETECTED
SODIUM SERPL-SCNC: 138 MMOL/L (ref 136–145)
WBC # BLD AUTO: 5.5 X10*3/UL (ref 4.4–11.3)

## 2024-04-14 PROCEDURE — 87635 SARS-COV-2 COVID-19 AMP PRB: CPT | Performed by: STUDENT IN AN ORGANIZED HEALTH CARE EDUCATION/TRAINING PROGRAM

## 2024-04-14 PROCEDURE — 1100000001 HC PRIVATE ROOM DAILY

## 2024-04-14 PROCEDURE — 82550 ASSAY OF CK (CPK): CPT | Performed by: STUDENT IN AN ORGANIZED HEALTH CARE EDUCATION/TRAINING PROGRAM

## 2024-04-14 PROCEDURE — 85027 COMPLETE CBC AUTOMATED: CPT | Performed by: STUDENT IN AN ORGANIZED HEALTH CARE EDUCATION/TRAINING PROGRAM

## 2024-04-14 PROCEDURE — 2500000002 HC RX 250 W HCPCS SELF ADMINISTERED DRUGS (ALT 637 FOR MEDICARE OP, ALT 636 FOR OP/ED): Performed by: STUDENT IN AN ORGANIZED HEALTH CARE EDUCATION/TRAINING PROGRAM

## 2024-04-14 PROCEDURE — 80069 RENAL FUNCTION PANEL: CPT | Performed by: STUDENT IN AN ORGANIZED HEALTH CARE EDUCATION/TRAINING PROGRAM

## 2024-04-14 PROCEDURE — 36415 COLL VENOUS BLD VENIPUNCTURE: CPT | Performed by: STUDENT IN AN ORGANIZED HEALTH CARE EDUCATION/TRAINING PROGRAM

## 2024-04-14 PROCEDURE — 2500000004 HC RX 250 GENERAL PHARMACY W/ HCPCS (ALT 636 FOR OP/ED): Performed by: STUDENT IN AN ORGANIZED HEALTH CARE EDUCATION/TRAINING PROGRAM

## 2024-04-14 PROCEDURE — 2500000002 HC RX 250 W HCPCS SELF ADMINISTERED DRUGS (ALT 637 FOR MEDICARE OP, ALT 636 FOR OP/ED): Mod: MUE | Performed by: STUDENT IN AN ORGANIZED HEALTH CARE EDUCATION/TRAINING PROGRAM

## 2024-04-14 PROCEDURE — 99232 SBSQ HOSP IP/OBS MODERATE 35: CPT | Performed by: STUDENT IN AN ORGANIZED HEALTH CARE EDUCATION/TRAINING PROGRAM

## 2024-04-14 PROCEDURE — 2500000001 HC RX 250 WO HCPCS SELF ADMINISTERED DRUGS (ALT 637 FOR MEDICARE OP): Performed by: STUDENT IN AN ORGANIZED HEALTH CARE EDUCATION/TRAINING PROGRAM

## 2024-04-14 RX ORDER — SERTRALINE HYDROCHLORIDE 50 MG/1
100 TABLET, FILM COATED ORAL DAILY
Status: DISCONTINUED | OUTPATIENT
Start: 2024-04-14 | End: 2024-04-15 | Stop reason: HOSPADM

## 2024-04-14 RX ORDER — HYDROXYZINE HYDROCHLORIDE 25 MG/1
50 TABLET, FILM COATED ORAL EVERY 6 HOURS PRN
Status: DISCONTINUED | OUTPATIENT
Start: 2024-04-14 | End: 2024-04-15 | Stop reason: HOSPADM

## 2024-04-14 RX ADMIN — OLANZAPINE 15 MG: 5 TABLET, FILM COATED ORAL at 21:29

## 2024-04-14 RX ADMIN — METRONIDAZOLE 500 MG: 500 TABLET ORAL at 21:30

## 2024-04-14 RX ADMIN — METRONIDAZOLE 500 MG: 500 TABLET ORAL at 11:22

## 2024-04-14 RX ADMIN — SODIUM CHLORIDE, POTASSIUM CHLORIDE, SODIUM LACTATE AND CALCIUM CHLORIDE 250 ML/HR: 600; 310; 30; 20 INJECTION, SOLUTION INTRAVENOUS at 06:53

## 2024-04-14 ASSESSMENT — COGNITIVE AND FUNCTIONAL STATUS - GENERAL
DAILY ACTIVITIY SCORE: 24
MOBILITY SCORE: 24

## 2024-04-14 ASSESSMENT — PAIN SCALES - GENERAL
PAINLEVEL_OUTOF10: 0 - NO PAIN
PAINLEVEL_OUTOF10: 0 - NO PAIN

## 2024-04-14 ASSESSMENT — PAIN - FUNCTIONAL ASSESSMENT: PAIN_FUNCTIONAL_ASSESSMENT: 0-10

## 2024-04-14 NOTE — CARE PLAN
The patient's goals for the shift include      The clinical goals for the shift include iv fluids    Problem: Pain  Goal: My pain/discomfort is manageable  Outcome: Progressing     Problem: Safety  Goal: Patient will be injury free during hospitalization  Outcome: Progressing  Goal: I will remain free of falls  Outcome: Progressing     Problem: Daily Care  Goal: Daily care needs are met  Outcome: Progressing     Problem: Psychosocial Needs  Goal: Demonstrates ability to cope with hospitalization/illness  Outcome: Progressing  Goal: Collaborate with me, my family, and caregiver to identify my specific goals  Outcome: Progressing  Flowsheets (Taken 4/14/2024 0900)  Cultural Requests During Hospitalization: none  Spiritual Requests During Hospitalization: none     Problem: Discharge Barriers  Goal: My discharge needs are met  Outcome: Progressing     Problem: Infection related to problem list condition  Goal: Infection will resolve through treatment  Outcome: Progressing     Problem: Ineffective Coping  Goal: LTG - Verbalizes alternatives to suicide  Outcome: Progressing  Goal: STG - Verbalizes control of suicidal thoughts/behaviors  Outcome: Progressing  Goal: Notifies staff when experiencing harmful thoughts toward self/others  Outcome: Progressing  Goal: Verbalizes improved well being  Outcome: Progressing  Goal: Verbalizes ways to manage anxiety  Outcome: Progressing     Problem: Self Care Deficit  Goal: STG - Patient completes hygiene  Outcome: Progressing  Goal: Increase group attendance  Outcome: Progressing  Goal: Accepts need for medications  Outcome: Progressing  Goal: STG - Goes to and eats meals independently in dining room 100% of time  Outcome: Progressing     Problem: Alteration in Sleep  Goal: STG - Reports nightly sleep, duration, and quality  Outcome: Progressing  Goal: STG - Identifies sleep hygiene aids  Outcome: Progressing  Goal: STG - Informs staff if unable to sleep  Outcome: Progressing  Goal:  STG - Attends breathing and relaxation group  Outcome: Progressing     Problem: Alteration in Mood  Goal: STG - Desires improved energy level  Outcome: Progressing  Goal: STG - Desires improved mood  Outcome: Progressing     Problem: Altered Thought Processes as Evidenced by  Goal: STG - Desires improvement in ability to think and concentrate  Outcome: Progressing  Goal: STG - Participates in Occupational Therapy and other cognitive assessments  Outcome: Progressing

## 2024-04-14 NOTE — NURSING NOTE
Patient did not arrive to room until approximately 0830.     Patient awake, alert x 4. Bed locked in low position. Call bell within reach. Sitter with patient. Safety maintained.     Patient was anxious at times. Patient wanted nurse to call a violent code because he stated he would wait to get aggressive when police arrived. Nurse able to speak with patient and get doctor involved.    Once patient spoke with doctor, he was okay. He calmed himself.     IV nurse placed new iv in left hand. Safety maintained.     1900 patient remained safe during shift.

## 2024-04-14 NOTE — PROGRESS NOTES
Transitional Care Coordinator Progress Note:   Attempted to meet with pt this morning but pt was eating his breakfast and requested I come back at another time.  Per [facility] pt will likely be ready for discharge to inpatient psych.  Pt will need a Covid test within 72 hr, medical clearance documented and an order for EPAT.  Care coordinator will continue to follow for discharge planning needs.     Leiyd Hill MSN, RN-BC  Transitional Care Coordinator (TCC)  [phone]

## 2024-04-14 NOTE — CARE PLAN
The patient's goals for the shift include      The clinical goals for the shift include iv fluids        Problem: Psychosocial Needs  Goal: Collaborate with me, my family, and caregiver to identify my specific goals  Recent Flowsheet Documentation  Taken 4/14/2024 0900 by Mary Sheehan RN  Cultural Requests During Hospitalization: none  Spiritual Requests During Hospitalization: none

## 2024-04-15 VITALS
HEIGHT: 70 IN | TEMPERATURE: 98.1 F | DIASTOLIC BLOOD PRESSURE: 69 MMHG | HEART RATE: 71 BPM | WEIGHT: 240 LBS | RESPIRATION RATE: 17 BRPM | BODY MASS INDEX: 34.36 KG/M2 | SYSTOLIC BLOOD PRESSURE: 106 MMHG | OXYGEN SATURATION: 95 %

## 2024-04-15 LAB
ALBUMIN SERPL BCP-MCNC: 3.5 G/DL (ref 3.4–5)
ANION GAP SERPL CALC-SCNC: 13 MMOL/L (ref 10–20)
BUN SERPL-MCNC: 11 MG/DL (ref 6–23)
CALCIUM SERPL-MCNC: 9 MG/DL (ref 8.6–10.6)
CHLORIDE SERPL-SCNC: 106 MMOL/L (ref 98–107)
CO2 SERPL-SCNC: 24 MMOL/L (ref 21–32)
CREAT SERPL-MCNC: 0.88 MG/DL (ref 0.5–1.3)
EGFRCR SERPLBLD CKD-EPI 2021: >90 ML/MIN/1.73M*2
ERYTHROCYTE [DISTWIDTH] IN BLOOD BY AUTOMATED COUNT: 13.1 % (ref 11.5–14.5)
GLUCOSE SERPL-MCNC: 90 MG/DL (ref 74–99)
HCT VFR BLD AUTO: 38.3 % (ref 41–52)
HGB BLD-MCNC: 12.3 G/DL (ref 13.5–17.5)
MCH RBC QN AUTO: 26.7 PG (ref 26–34)
MCHC RBC AUTO-ENTMCNC: 32.1 G/DL (ref 32–36)
MCV RBC AUTO: 83 FL (ref 80–100)
NRBC BLD-RTO: 0 /100 WBCS (ref 0–0)
PHOSPHATE SERPL-MCNC: 4.6 MG/DL (ref 2.5–4.9)
PLATELET # BLD AUTO: 356 X10*3/UL (ref 150–450)
POTASSIUM SERPL-SCNC: 3.5 MMOL/L (ref 3.5–5.3)
RBC # BLD AUTO: 4.61 X10*6/UL (ref 4.5–5.9)
SODIUM SERPL-SCNC: 139 MMOL/L (ref 136–145)
WBC # BLD AUTO: 5.1 X10*3/UL (ref 4.4–11.3)

## 2024-04-15 PROCEDURE — 99239 HOSP IP/OBS DSCHRG MGMT >30: CPT | Performed by: STUDENT IN AN ORGANIZED HEALTH CARE EDUCATION/TRAINING PROGRAM

## 2024-04-15 PROCEDURE — 85027 COMPLETE CBC AUTOMATED: CPT | Performed by: STUDENT IN AN ORGANIZED HEALTH CARE EDUCATION/TRAINING PROGRAM

## 2024-04-15 PROCEDURE — 36415 COLL VENOUS BLD VENIPUNCTURE: CPT | Performed by: STUDENT IN AN ORGANIZED HEALTH CARE EDUCATION/TRAINING PROGRAM

## 2024-04-15 PROCEDURE — 80069 RENAL FUNCTION PANEL: CPT | Performed by: STUDENT IN AN ORGANIZED HEALTH CARE EDUCATION/TRAINING PROGRAM

## 2024-04-15 PROCEDURE — 2500000002 HC RX 250 W HCPCS SELF ADMINISTERED DRUGS (ALT 637 FOR MEDICARE OP, ALT 636 FOR OP/ED): Performed by: STUDENT IN AN ORGANIZED HEALTH CARE EDUCATION/TRAINING PROGRAM

## 2024-04-15 PROCEDURE — 2500000001 HC RX 250 WO HCPCS SELF ADMINISTERED DRUGS (ALT 637 FOR MEDICARE OP): Performed by: STUDENT IN AN ORGANIZED HEALTH CARE EDUCATION/TRAINING PROGRAM

## 2024-04-15 PROCEDURE — 2500000002 HC RX 250 W HCPCS SELF ADMINISTERED DRUGS (ALT 637 FOR MEDICARE OP, ALT 636 FOR OP/ED): Mod: MUE | Performed by: STUDENT IN AN ORGANIZED HEALTH CARE EDUCATION/TRAINING PROGRAM

## 2024-04-15 RX ORDER — HYDROXYZINE HYDROCHLORIDE 50 MG/1
50 TABLET, FILM COATED ORAL EVERY 6 HOURS PRN
Start: 2024-04-15 | End: 2024-05-15

## 2024-04-15 RX ORDER — ACETAMINOPHEN 500 MG
5 TABLET ORAL NIGHTLY PRN
Start: 2024-04-15 | End: 2024-05-15

## 2024-04-15 RX ADMIN — SERTRALINE HYDROCHLORIDE 100 MG: 50 TABLET ORAL at 09:07

## 2024-04-15 RX ADMIN — METRONIDAZOLE 500 MG: 500 TABLET ORAL at 09:07

## 2024-04-15 ASSESSMENT — COGNITIVE AND FUNCTIONAL STATUS - GENERAL
MOBILITY SCORE: 24
DAILY ACTIVITIY SCORE: 24
DAILY ACTIVITIY SCORE: 24
MOBILITY SCORE: 24

## 2024-04-15 ASSESSMENT — PAIN SCALES - GENERAL
PAINLEVEL_OUTOF10: 0 - NO PAIN
PAINLEVEL_OUTOF10: 0 - NO PAIN

## 2024-04-15 ASSESSMENT — COLUMBIA-SUICIDE SEVERITY RATING SCALE - C-SSRS
6. HAVE YOU EVER DONE ANYTHING, STARTED TO DO ANYTHING, OR PREPARED TO DO ANYTHING TO END YOUR LIFE?: NO
2. HAVE YOU ACTUALLY HAD ANY THOUGHTS OF KILLING YOURSELF?: NO
1. SINCE LAST CONTACT, HAVE YOU WISHED YOU WERE DEAD OR WISHED YOU COULD GO TO SLEEP AND NOT WAKE UP?: NO

## 2024-04-15 ASSESSMENT — PAIN - FUNCTIONAL ASSESSMENT: PAIN_FUNCTIONAL_ASSESSMENT: 0-10

## 2024-04-15 NOTE — NURSING NOTE
Patient anticipated to discharge to [facility] at 1545 by [facility], AVS to be provided to transport, pink slip provided with transport, report called to Julissa at [facility].

## 2024-04-15 NOTE — NURSING NOTE
Patient PIVs x2 removed this am by patient, no dressings applied, patient belongings returned, cell phone and shoes are not in med room, followed up with ED - no belongings noted, left via [facility] - noted to be irritable when discharging.

## 2024-04-15 NOTE — CARE PLAN
The patient's goals for the shift include safety    The clinical goals for the shift include pt will be compliant with care by the end of the shift    Over the shift, the patient did make progress toward the following goals.

## 2024-04-15 NOTE — PROGRESS NOTES
04/15/24 1300   Discharge Planning   Home or Post Acute Services Post acute facilities (Rehab/SNF/etc)   Type of Post Acute Facility Services Other (Comment)  (Inpatient Psych)   Patient expects to be discharged to: [facility]   Does the patient need discharge transport arranged? Yes   RoundTrip coordination needed? Yes   Has discharge transport been arranged? Yes   What day is the transport expected? 04/15/24   What time is the transport expected? 1545     Patient to discharge to [facility] today. Transport arranged via [facility] ([phone]) stretcher at 345pm. Report #: . Patient, MD, [place] JONES aware. Blue slip delivered to unit secretary.  Fifi REEVESN, RN  Transitional Care Coordinator (TCC)  [phone]

## 2024-04-15 NOTE — CARE PLAN
The patient's goals for the shift include safety    The clinical goals for the shift include Pt will remain safe this shift    Over the shift, the patient did not make progress toward the following goals. Barriers to progression include SI. Recommendations to address these barriers include one on supervision.

## 2024-04-15 NOTE — PROGRESS NOTES
Transitional Care Coordinator Progress Note:   Attempted to meet with pt this morning but pt refused to answer any questions.  Pt had eaten breakfast and was now back in bed with sitter at the bedside.      Leidy Hill MSN, RN-BC  Transitional Care Coordinator (TCC)  [phone]

## 2024-04-15 NOTE — DISCHARGE SUMMARY
Discharge Diagnosis  Non-traumatic rhabdomyolysis    Issues Requiring Follow-Up  Fu with PCP    Test Results Pending At Discharge  Pending Labs       No current pending labs.            Hospital Course         Nathan Vaughn is a 39-year-old male with past medical history of polysubstance use disorder, schizoaffective disorder, bipolar disorder who presented for psychiatric evaluation and STD testing. Patient reporting paranoia and delusions in the setting of cocaine and cannabis use which she reported earlier in the morning. Came for STI testing and substance induced psychosis. Had SI passive. Treated for marisol and rhabdo, now is resolved. Medically clear, epat notified, pt aware, gave 2 preferences per patient as well. [facility] .     Discharge plan of care discussed, education and counseling provided involving active problem care plan, warning signs,  risks/benefits of new medications or medication changes, follow-up care and testing.  Patient advised to follow-up with her primary care within 1 week of discharge or the next available for posthospitalization transition of care.  There was verbalized understanding and agreement with discharge care plan.      Pt instructed to take all medications as prescribed.  Keep all follow-up appointments.  Contact their primary care physician with any questions or concerns that arise.  Come to the emergency department with worsening of your symptoms or any other medical emergency. Instructed that any outpatient tests that are ordered for outpatient follow up are meant to expedite outpatient work up and management, and that the results are not followed or managed by the inpatient ordering team and MUST be followed up with the outpatient primary care or outpatient specialist.  Verbal understanding and agreement obtained to order tests for outpatient follow up purposes.       Time conducting this discharge is >40 minutes                     Pertinent Physical Exam At Time of Discharge  Physical Exam    Home Medications     Medication List      START taking these medications     hydrOXYzine HCL 50 mg tablet; Commonly known as: Atarax; Take 1 tablet   (50 mg) by mouth every 6 hours if needed for anxiety.   melatonin 5 mg tablet; Take 1 tablet (5 mg) by mouth as needed at   bedtime for sleep.     CONTINUE taking these medications     acetaminophen 500 mg tablet; Commonly known as: Tylenol   OLANZapine 5 mg tablet; Commonly known as: ZyPREXA   sertraline 50 mg tablet; Commonly known as: Zoloft       Outpatient Follow-Up  No future appointments.    Lexii Buckley MD

## 2024-04-15 NOTE — PROGRESS NOTES
Assessment/Plan     Nathan Vaughn is a 39-year-old male with past medical history of polysubstance use disorder, schizoaffective disorder, bipolar disorder who presented for psychiatric evaluation and STD testing. Patient reporting paranoia and delusions in the setting of cocaine and cannabis use which she reported earlier in the morning. Came for STI testing and substance induced psychosis. Had SI passive. Treated for eduardo and rhabdo.   - Medically clear, epat notified, pt aware, gave 2 preferences per patient as well. Ready        -------------------------------------------  39-year-old male with past medical history of polysubstance use disorder, schizoaffective disorder, bipolar disorder who presented for psychiatric evaluation and STD testing.  Patient reporting paranoia and delusions in the setting of cocaine and cannabis use which she reported earlier in the morning.  He wanted to get HIV testing because he felt he got it in retirement.  When asked about SI/HI he said he has self-destructive behavior and could harm himself but he does not want to, denied a plan. Placed on 1:1, admitted to medicine. Hiv/syphilis neg, GC/Chlam pending. Pt is a smoker. Utox was positive for amphetamines, cocaine and fentanyl.  Labs showed EDUARDO and rhabdo CPK elevation to 2.6K and cr of 1.3.  Due to his fluid started on maintenance.    # Rhabdo secondary to substance abuse  # EDUARDO secondary to rhabdo versus dehydration versus substance toxicity  -Status post 2 L of fluids, change maintenance fluids to 250 cc an hour  -CK downtrending, will trend  -Creatinine improved 1.35 to 0.96.   -CPK down trended  -Resolved, DC fluids      #polysubstance abuse  -Leading to rhabdo as above  -Dependence, cocaine and fentanyl  - SW consulted for resources for substance use and mental health  - counselling provided by primary    # Schizoaffective disorder, bipolar disorder versus substance-induced psychosis  #passive SI  -Currently suspect  "substance-induced psychosis.  Reported history of above  -Patient admitted to self-destructive behavior and that he could harm himself but does not have a plan or intents to  -Reported to having paranoia and delusions  -Psych consulted, recommended inpatient psych.  Continue olanzapine, sertraline, as needed Atarax  -Continue one-to-one sitter  -Medically cleared, EPAT notified, COVID ordered.  - Reports his first choice for inpatient psych would be [facility] and if he cannot get in there a second choice would be [facility].  This [facility] and in the consult    # at risk for STI  # Trichomonas infection  -Patient reported that he feels he had HIV at residential  -HIV negative, syphilis negative  -Trichomonas positive, start Flagyl, counseling provided.  Will complete 7 days of treatment.  Counseled on need to inform any partners and for them to get treated as well.  Recommended follow-up test outpatient with primary care  - GC/chlamydia negative      Scheduled outpatient appointments in system:   No future appointments.  ---------------------------------------------------------------------------------------------------  Subjective   No new events.  Patient on one-to-one use, sleeping awakens, more participative in conversations.  No acute issues per patient.  Feeling well.  Reports his first choice for inpatient psych would be [facility] and if he cannot get in there a second choice would be [facility].  This was communicated to [facility] and in consult.   ---------------------------------------------------------------------------------------------------  Objective   Last Recorded Vitals  Blood pressure 106/69, pulse 71, temperature 36.7 °C (98.1 °F), resp. rate 17, height 1.778 m (5' 10\"), weight 109 kg (240 lb), SpO2 95%.  Intake/Output last 3 Shifts:  No intake/output data recorded.    Physical Exam  Vitals and nursing note reviewed.   Constitutional:       General: He is not in acute " distress.     Appearance: Normal appearance. He is not ill-appearing or toxic-appearing.      Comments: Sleeping but awakens easily, more interactive and participating but withdrawn   HENT:      Head: Normocephalic and atraumatic.      Mouth/Throat:      Mouth: Mucous membranes are moist.   Eyes:      General: No scleral icterus.     Extraocular Movements: Extraocular movements intact.      Conjunctiva/sclera: Conjunctivae normal.   Cardiovascular:      Rate and Rhythm: Normal rate and regular rhythm.      Heart sounds: S1 normal and S2 normal. No murmur heard.  Pulmonary:      Effort: Pulmonary effort is normal. No respiratory distress.      Breath sounds: No wheezing, rhonchi or rales.   Abdominal:      General: Bowel sounds are normal. There is no distension.      Palpations: Abdomen is soft.      Tenderness: There is no abdominal tenderness. There is no guarding or rebound.   Musculoskeletal:         General: No swelling or deformity.      Cervical back: Neck supple.   Skin:     General: Skin is warm and dry.      Findings: No rash.   Neurological:      General: No focal deficit present.      Mental Status: He is alert. Mental status is at baseline.      Comments: Moving all extremities   Psychiatric:         Mood and Affect: Mood normal.      Comments: Withdrawn         Relevant Results  Lab Results   Component Value Date    WBC 5.1 04/15/2024    HGB 12.3 (L) 04/15/2024    HCT 38.3 (L) 04/15/2024    MCV 83 04/15/2024     04/15/2024      Lab Results   Component Value Date    GLUCOSE 90 04/15/2024    CALCIUM 9.0 04/15/2024     04/15/2024    K 3.5 04/15/2024    CO2 24 04/15/2024     04/15/2024    BUN 11 04/15/2024    CREATININE 0.88 04/15/2024     Scheduled medications  metroNIDAZOLE, 500 mg, oral, q12h ETHAN  OLANZapine, 15 mg, oral, Nightly  sertraline, 100 mg, oral, Daily      Continuous medications       PRN medications  PRN medications: hydrOXYzine HCL, melatonin    Lexii Buckley MD

## 2024-04-15 NOTE — SIGNIFICANT EVENT
Application for Emergency Admission      Ready for Transfer?  Is the patient medically cleared for transfer to inpatient psychiatry: Yes  Has the patient been accepted to an inpatient psychiatric hospital: Yes    Application for Emergency Admission  IN ACCORDANCE WITH [place] 5122[place]10 [place]  The Chief Clinical Officer of: Elizabeth Rasmussen  4/15/2024 .12:59 PM    Reason for Hospitalization  The undersigned has reason to believe that: Nathan Vaughn Is a mentally ill person subject to hospitalization by court order under division B Section 5122.01 of the Revised Code, i.e., this person:    1.Yes  Represents a substantial risk of physical harm to self as manifested by evidence of threats of, or attempts at, suicide or serious self-inflicted bodily harm    2.Yes Represents a substantial risk of physical harm to others as manifested by evidence of recent homicidal or other violent behavior, evidence of recent threats that place another in reasonable fear of violent behavior and serious physical harm, or other evidence of present dangerousness    3.Yes Represents a substantial and immediate risk of serious physical impairment or injury to self as manifested by  evidence that the person is unable to provide for and is not providing for the person's basic physical needs because of the person's mental illness and that appropriate provision for those needs cannot be made  immediately available in the community    4.Yes Would benefit from treatment in a hospital for his mental illness and is in need of such treatment as manifested by evidence of behavior that creates a grave and imminent risk to substantial rights of others or  himself.    5.Yes Would benefit from treatment as manifested by evidence of behavior that indicates all of the following:       (a) The person is unlikely to survive safely in the community without supervision, based on a clinical determination.       (b) The person has a history of lack of  compliance with treatment for mental illness and one of the following applies:      (i) At least twice within the thirty-six months prior to the filing of an affidavit seeking court-ordered treatment of the person under section 5122.111 of the Revised Code, the lack of compliance has been a significant factor in necessitating hospitalization in a hospital or receipt of services in a forensic or other mental health unit of a correctional facility, provided that the thirty-six-month period shall be extended by the length of any hospitalization or incarceration of the person that occurred within the thirty-six-month period.      (ii) Within the forty-eight months prior to the filing of an affidavit seeking court-ordered treatment of the person under section 5122.111 of the Revised Code, the lack of compliance resulted in one or more acts of serious violent behavior toward self or others or threats of, or attempts at, serious physical harm to self or others, provided that the forty-eight-month period shall be extended by the length of any hospitalization or incarceration of the person that occurred within the forty-eight-month period.      (c) The person, as a result of mental illness, is unlikely to voluntarily participate in necessary treatment.       (d) In view of the person's treatment history and current behavior, the person is in need of treatment in order to prevent a relapse or deterioration that would be likely to result in substantial risk of serious harm to the person or others.    (e) Represents a substantial risk of physical harm to self or others if allowed to remain at liberty pending examination.    Therefore, it is requested that said person be admitted to the above named facility.    STATEMENT OF BELIEF    Must be filled out by one of the following: a psychiatrist, licensed physician, licensed clinical psychologist, health or ,  or .  (Statement shall include the  circumstances under which the individual was taken into custody and the reason for the person's belief that hospitalization is necessary. The statement shall also include a reference to efforts made to secure the individual's property at his residence if he was taken into custody there. Every reasonable and appropriate effort should be made to take this person into custody in the least conspicuous manner possible.)           Lexii Buckley MD 4/15/2024     _____________________________________________________________   Place of Employment: [facility]    STATEMENT OF OBSERVATION BY PSYCHIATRIST, LICENSED PHYSICIAN, OR LICENSED CLINICAL PSYCHOLOGIST, IF APPLICABLE    Place of Observation (e.g., [facility] mental health center, general hospital, office, emergency facility)  (If applicable, please complete)    Lexii Buckley MD 4/15/2024    _____________________________________________________________

## 2024-07-06 ENCOUNTER — HOSPITAL ENCOUNTER (EMERGENCY)
Facility: HOSPITAL | Age: 39
Discharge: HOME | End: 2024-07-07
Attending: EMERGENCY MEDICINE
Payer: MEDICARE

## 2024-07-06 DIAGNOSIS — F19.90 POLYSUBSTANCE USE DISORDER: Primary | ICD-10-CM

## 2024-07-06 LAB
ALBUMIN SERPL BCP-MCNC: 4.9 G/DL (ref 3.4–5)
ALP SERPL-CCNC: 90 U/L (ref 33–120)
ALT SERPL W P-5'-P-CCNC: 38 U/L (ref 10–52)
ANION GAP SERPL CALC-SCNC: 18 MMOL/L (ref 10–20)
APAP SERPL-MCNC: <10 UG/ML
AST SERPL W P-5'-P-CCNC: 41 U/L (ref 9–39)
BASOPHILS # BLD AUTO: 0.03 X10*3/UL (ref 0–0.1)
BASOPHILS NFR BLD AUTO: 0.4 %
BILIRUB SERPL-MCNC: 0.9 MG/DL (ref 0–1.2)
BUN SERPL-MCNC: 16 MG/DL (ref 6–23)
CALCIUM SERPL-MCNC: 9.8 MG/DL (ref 8.6–10.6)
CHLORIDE SERPL-SCNC: 100 MMOL/L (ref 98–107)
CO2 SERPL-SCNC: 21 MMOL/L (ref 21–32)
CREAT SERPL-MCNC: 1.15 MG/DL (ref 0.5–1.3)
EGFRCR SERPLBLD CKD-EPI 2021: 84 ML/MIN/1.73M*2
EOSINOPHIL # BLD AUTO: 0.09 X10*3/UL (ref 0–0.7)
EOSINOPHIL NFR BLD AUTO: 1.2 %
ERYTHROCYTE [DISTWIDTH] IN BLOOD BY AUTOMATED COUNT: 13.2 % (ref 11.5–14.5)
ETHANOL SERPL-MCNC: <10 MG/DL
GLUCOSE SERPL-MCNC: 105 MG/DL (ref 74–99)
HCT VFR BLD AUTO: 38.3 % (ref 41–52)
HGB BLD-MCNC: 13.4 G/DL (ref 13.5–17.5)
IMM GRANULOCYTES # BLD AUTO: 0.02 X10*3/UL (ref 0–0.7)
IMM GRANULOCYTES NFR BLD AUTO: 0.3 % (ref 0–0.9)
LYMPHOCYTES # BLD AUTO: 1.96 X10*3/UL (ref 1.2–4.8)
LYMPHOCYTES NFR BLD AUTO: 26.7 %
MCH RBC QN AUTO: 26.6 PG (ref 26–34)
MCHC RBC AUTO-ENTMCNC: 35 G/DL (ref 32–36)
MCV RBC AUTO: 76 FL (ref 80–100)
MONOCYTES # BLD AUTO: 0.97 X10*3/UL (ref 0.1–1)
MONOCYTES NFR BLD AUTO: 13.2 %
NEUTROPHILS # BLD AUTO: 4.28 X10*3/UL (ref 1.2–7.7)
NEUTROPHILS NFR BLD AUTO: 58.2 %
NRBC BLD-RTO: 0 /100 WBCS (ref 0–0)
PLATELET # BLD AUTO: 442 X10*3/UL (ref 150–450)
POTASSIUM SERPL-SCNC: 4.7 MMOL/L (ref 3.5–5.3)
PROT SERPL-MCNC: 8.4 G/DL (ref 6.4–8.2)
RBC # BLD AUTO: 5.03 X10*6/UL (ref 4.5–5.9)
SALICYLATES SERPL-MCNC: <3 MG/DL
SODIUM SERPL-SCNC: 134 MMOL/L (ref 136–145)
WBC # BLD AUTO: 7.4 X10*3/UL (ref 4.4–11.3)

## 2024-07-06 PROCEDURE — 85025 COMPLETE CBC W/AUTO DIFF WBC: CPT

## 2024-07-06 PROCEDURE — 96372 THER/PROPH/DIAG INJ SC/IM: CPT

## 2024-07-06 PROCEDURE — 2500000004 HC RX 250 GENERAL PHARMACY W/ HCPCS (ALT 636 FOR OP/ED): Mod: JG

## 2024-07-06 PROCEDURE — 99285 EMERGENCY DEPT VISIT HI MDM: CPT | Performed by: EMERGENCY MEDICINE

## 2024-07-06 PROCEDURE — 2500000001 HC RX 250 WO HCPCS SELF ADMINISTERED DRUGS (ALT 637 FOR MEDICARE OP)

## 2024-07-06 PROCEDURE — 80320 DRUG SCREEN QUANTALCOHOLS: CPT

## 2024-07-06 PROCEDURE — 80053 COMPREHEN METABOLIC PANEL: CPT

## 2024-07-06 PROCEDURE — 36415 COLL VENOUS BLD VENIPUNCTURE: CPT

## 2024-07-06 PROCEDURE — 99284 EMERGENCY DEPT VISIT MOD MDM: CPT

## 2024-07-06 PROCEDURE — 99285 EMERGENCY DEPT VISIT HI MDM: CPT

## 2024-07-06 RX ORDER — LORAZEPAM 0.5 MG/1
2 TABLET ORAL ONCE
Status: COMPLETED | OUTPATIENT
Start: 2024-07-06 | End: 2024-07-06

## 2024-07-06 RX ORDER — OLANZAPINE 10 MG/2ML
5 INJECTION, POWDER, FOR SOLUTION INTRAMUSCULAR ONCE
Status: COMPLETED | OUTPATIENT
Start: 2024-07-06 | End: 2024-07-06

## 2024-07-06 ASSESSMENT — PAIN SCALES - GENERAL: PAINLEVEL_OUTOF10: 0 - NO PAIN

## 2024-07-06 ASSESSMENT — PAIN - FUNCTIONAL ASSESSMENT: PAIN_FUNCTIONAL_ASSESSMENT: 0-10

## 2024-07-06 ASSESSMENT — COLUMBIA-SUICIDE SEVERITY RATING SCALE - C-SSRS
6. HAVE YOU EVER DONE ANYTHING, STARTED TO DO ANYTHING, OR PREPARED TO DO ANYTHING TO END YOUR LIFE?: YES
4. HAVE YOU HAD THESE THOUGHTS AND HAD SOME INTENTION OF ACTING ON THEM?: YES
5. HAVE YOU STARTED TO WORK OUT OR WORKED OUT THE DETAILS OF HOW TO KILL YOURSELF? DO YOU INTEND TO CARRY OUT THIS PLAN?: YES
6. HAVE YOU EVER DONE ANYTHING, STARTED TO DO ANYTHING, OR PREPARED TO DO ANYTHING TO END YOUR LIFE?: YES
1. IN THE PAST MONTH, HAVE YOU WISHED YOU WERE DEAD OR WISHED YOU COULD GO TO SLEEP AND NOT WAKE UP?: YES
2. HAVE YOU ACTUALLY HAD ANY THOUGHTS OF KILLING YOURSELF?: YES

## 2024-07-07 ENCOUNTER — CLINICAL SUPPORT (OUTPATIENT)
Dept: EMERGENCY MEDICINE | Facility: HOSPITAL | Age: 39
End: 2024-07-07
Payer: MEDICARE

## 2024-07-07 VITALS
RESPIRATION RATE: 18 BRPM | DIASTOLIC BLOOD PRESSURE: 76 MMHG | OXYGEN SATURATION: 97 % | HEART RATE: 100 BPM | BODY MASS INDEX: 31.5 KG/M2 | SYSTOLIC BLOOD PRESSURE: 112 MMHG | TEMPERATURE: 97.3 F | HEIGHT: 70 IN | WEIGHT: 220 LBS

## 2024-07-07 LAB
AMPHETAMINES UR QL SCN: ABNORMAL
BARBITURATES UR QL SCN: ABNORMAL
BENZODIAZ UR QL SCN: ABNORMAL
BZE UR QL SCN: ABNORMAL
CANNABINOIDS UR QL SCN: ABNORMAL
FENTANYL+NORFENTANYL UR QL SCN: ABNORMAL
METHADONE UR QL SCN: ABNORMAL
OPIATES UR QL SCN: ABNORMAL
OXYCODONE+OXYMORPHONE UR QL SCN: ABNORMAL
PCP UR QL SCN: ABNORMAL

## 2024-07-07 PROCEDURE — 93005 ELECTROCARDIOGRAM TRACING: CPT

## 2024-07-07 PROCEDURE — 80307 DRUG TEST PRSMV CHEM ANLYZR: CPT

## 2024-07-07 RX ORDER — SERTRALINE HYDROCHLORIDE 50 MG/1
50 TABLET, FILM COATED ORAL DAILY
Qty: 20 TABLET | Refills: 0 | Status: SHIPPED | OUTPATIENT
Start: 2024-07-07 | End: 2024-07-27

## 2024-07-07 RX ORDER — OLANZAPINE 5 MG/1
5 TABLET ORAL NIGHTLY
Qty: 20 TABLET | Refills: 0 | Status: SHIPPED | OUTPATIENT
Start: 2024-07-07 | End: 2024-07-27

## 2024-07-07 SDOH — HEALTH STABILITY: MENTAL HEALTH: NON-SPECIFIC ACTIVE SUICIDAL THOUGHTS (PAST 1 MONTH): YES

## 2024-07-07 SDOH — HEALTH STABILITY: MENTAL HEALTH: ACTIVE SUICIDAL IDEATION WITH SPECIFIC PLAN AND INTENT (PAST 1 MONTH): NO

## 2024-07-07 SDOH — HEALTH STABILITY: MENTAL HEALTH: ANXIETY SYMPTOMS: NO PROBLEMS REPORTED OR OBSERVED.

## 2024-07-07 SDOH — HEALTH STABILITY: MENTAL HEALTH: SUICIDAL BEHAVIOR (3 MONTHS): NO

## 2024-07-07 SDOH — HEALTH STABILITY: MENTAL HEALTH: SUICIDAL BEHAVIOR (LIFETIME): YES

## 2024-07-07 SDOH — HEALTH STABILITY: MENTAL HEALTH: DEPRESSION SYMPTOMS: FEELINGS OF HELPLESSNESS;INCREASED IRRITABILITY

## 2024-07-07 SDOH — HEALTH STABILITY: MENTAL HEALTH: ACTIVE SUICIDAL IDEATION WITH SOME INTENT TO ACT, WITHOUT SPECIFIC PLAN (PAST 1 MONTH): NO

## 2024-07-07 SDOH — HEALTH STABILITY: MENTAL HEALTH: WISH TO BE DEAD (PAST 1 MONTH): NO

## 2024-07-07 ASSESSMENT — LIFESTYLE VARIABLES
TOTAL SCORE: 0
EVER FELT BAD OR GUILTY ABOUT YOUR DRINKING: NO
EVER HAD A DRINK FIRST THING IN THE MORNING TO STEADY YOUR NERVES TO GET RID OF A HANGOVER: NO
HAVE YOU EVER FELT YOU SHOULD CUT DOWN ON YOUR DRINKING: NO
PRESCIPTION_ABUSE_PAST_12_MONTHS: NO
HAVE PEOPLE ANNOYED YOU BY CRITICIZING YOUR DRINKING: NO
SUBSTANCE_ABUSE_PAST_12_MONTHS: YES

## 2024-07-07 NOTE — ED PROVIDER NOTES
HPI: 39-year-old male history of antisocial personality disorder, polysubstance use disorder, suspected substance-induced psychosis presenting to the ED for evaluation.  Patient was reportedly on the bus, [place] police, endorsing polysubstance use including cocaine, meth, marijuana and alcohol.  Patient states that he currently wants to kill himself but with no clear plan.  He is endorsing suicidal and homicidal ideation, states he did not hurt anyone.  States he used multiple drugs today and is feeling agitated, requesting something to help relax.    ------------------------------------------------------------------------------------------------------------------------------------------  Physical Exam:    VS: As documented in the triage note and EMR flowsheet from this visit were reviewed.  General: Disheveled, rapid speech.  No acute distress.  Eyes: Pupils round and reactive. No scleral icterus.   HENT: Atraumatic. Normocephalic. Moist mucous membranes.   CV: Regular rate, regular rhythm.. No pedal edema appreciated.  Resp: Clear to auscultation bilaterally. Non-labored.    GI: Soft, nontender to palpation. Nondistended.   Skin: Warm, dry, intact. No systemic rashes or lesions appreciated.  Neuro: Alert. No focal neuro deficits observed. Speech fluent. Answers questions appropriately.   Psych: Disheveled.  Rapid speech, jumping from idea to idea.  Tangential, jumping from thoughts, endorsing homicidal ideation and suicidal ideation, states that he has used multiple intoxicants.  Endorsing hallucinations but not forthcoming in explaining.    ------------------------------------------------------------------------------------------------------------------------------------------    Medical Decision Making:  ED Course as of 07/07/24 0843   Sun Jul 07, 2024 0030 Med clear [KR]   2894 Electrocardiogram, 12-lead  Normal sinus rhythm rate 71, normal axis.  No acute ST segment elevation or depressions.  MN  136, QRS 86, QTc 456. [KR]   0664 Spoke with patient, he continues to endorse suicidal ideation with thoughts of wanting to harm himself.  Still pending U[facility] however will recommend evaluation by EPAT [KR]      ED Course User Index  [KR] Swati Deleon DO   39-year-old male history of antisocial personality disorder, polysubstance use disorder, prior versus substance-induced psychosis presenting to the ED for psychiatric evaluation.  Vital stable on arrival, EPAT labs obtained for medical clearance.  Tox panel negative.  Agreeable to oral medications given oral Ativan and Zyprexa.  Patient did require multiple attempts at verbal redirection, was initially in a hallway bed roaming around department, able to be verbally de-escalated but required significant attention.  Appears to have tangential thoughts, rapid pressured speech and flight of ideas.  Endorsing suicidal ideation without clear plan.  Did consider component of acute intoxication as contributing factor, monitored with plan for psychiatry evaluation.  On reevaluation patient was endorsing continued suicidal ideation therefore do feel that psychiatry evaluation is appropriate.  Pending their evaluation would recommend [facility] to patient.  Signed out to oncoming provider pending EPAT evaluation and recommendations.    Differential Diagnoses Considered:  See MDM     Chronic Medical Conditions Significantly Affecting Care:  See MDM     External Records Reviewed:  I reviewed recent and relevant outside records as noted in HPI above and MDM.      Social Determinants of Health Significantly Affecting Care:  See HPI/MDM     Prescription Drug Consideration:  See MDM     Diagnostic testing considered:  See MDM and relevant sections      Swati Dleeon DO  EM PGY-2     Swati Deleon DO  Resident  07/07/24 4948

## 2024-07-07 NOTE — PROGRESS NOTES
EPAT - Social Work Psychiatric Assessment    Arrival Details  Mode of Arrival: Ambulatory  Admission Source:  ([place])  Admission Type: Voluntary  EPAT Assessment Start Date: 07/07/24  EPAT Assessment Start Time: 0730  Name of : SHYAM Lombardi LSW    History of Present Illness    Admission Reason: Psychiatric Evaluation    HPI:     Patient is a 40yo male presenting to the ED via [place] police (not under arrest) with chief complaint of “addiction problem” and suicidal ideation. Reportedly, “Pt states that he has substance abuse disorder that makes him want to kill himself. Pt was riding the bus all day and was [place] off by [place] police. Pt states that he took cocaine, meth, marijuana, and ETOH”. Patient's extensive chart history, triage, and provider note reviewed prior to assessment. Patient has a psychiatric history of Antisocial Personality D/O, PSUD, and likely Substance-Induced Psychotic/Mood Disorder. Patient reports a remote history of cutting his wrist approximately 9 years ago, indicated high risk at triage. BAL negative, UTOX not available but patient reports using meth, cocaine, and cannabis prior to this encounter.     The patient has a significant history of inpatient admissions - he is typically medically admitted for rhabdo in the setting of chronic stimulant abuse and then transferred to a psychiatric unit once CK is managed. The patient demonstrates chronic non-compliance with outpatient treatment due to being in the hospital more frequently than being in the community. Patient consistently presents to the ED with reports of SI/HI/paranoia and sometimes CAH, frequently immediately after discharge from another psychiatric facility, and demonstrates no change in behaviors after these admissions. The patient is known to be vague with report of SI or HI and will often grandstand/escalate himself when asked to clarify reports, likely in an attempt to intimidate the . Within the past month,  "patient was released from skilled nursing then presented directly to [facility] ED on 6/3. Patient was medically admitted 6/3-6/5 for rhabdo and then transferred to [facility] for an inpatient admission (discharge date not available for review). Once discharged, patient again presented back to [facility] ED 6/15/24 and was again admitted to [facility] on 6/16/24 (discharge date not available), at which point he had reported a need for admission due to homelessness. Finally, patient presented back to [facility] and was admitted there 6/30-7/3/24.     Of note, it was documented during patient's most recent admission to [facility] 6/30-7/3/24,   Patient was uncooperative with assessments from RN, SW, physicians. Patient was selectively alert and linear/organized, as he would engage in conversations regarding requesting foods or going to receive meals. Otherwise, patient would ignore assessment questions. Patient ignored SW assessment except that when told he would likely be discharged at the end of pink slip unless he signed voluntary, the patient signed voluntary. However, patient remained uncooperative with assessment even after signing voluntary and staff explaining the meaning of voluntary involving talking with treatment team. On 7/3 the patient was interviewed with resident physician, attending physician, RYAN. Patient ignored attempts at engagement for several minutes and eventually abruptly jumped from lying position to standing on his bed, posturing at staff, and yelling \"YOU WANNA TALK NOW?! YOU STILL WANNA TALK, B*TCH?!\" Patient's behaviors overall consistent with intimidation, violation of the rights of others, disregard for the safety of others supportive of diagnosis of antisocial personality disorder. Patient's voluntary form was declined due to uncooperative with assessment, and patient was deemed not to meet criteria for probate. At no point during hospitalization did it seem that psychosis was driving patient's behaviors, and zyprexa was " continued and sent to pharmacy in setting of presumed substance induced psychosis on arrival to ED. On the day of discharge the patient had no SI/HI, auditory/visual hallucinations.    SW Readmission Information   Readmission within 30 Days: Yes  Previous ED Visit Date and Reason : see HPI  Previous Discharge Date and Location: see HPI  Factors Contributing to  Readmission Inpatient/ED (Team Perspective): Homeless, Substance Abuse    Psychiatric Symptoms  Anxiety Symptoms: No problems reported or observed.  Depression Symptoms: Feelings of helplessness, Increased irritability  Gely Symptoms: No problems reported or observed.    Psychosis Symptoms  Hallucination Type: No problems reported or observed.  Delusion Type: No problems reported or observed.    Additional Symptoms - Adult  Generalized Anxiety Disorder: No problems reported or observed.  Obsessive Compulsive Disorder: No problems reported or observed.  Panic Attack: No problems reported or observed.  Post Traumatic Stress Disorder: No problems reported or observed.  Delirium: No problems reported or observed.    Past Psychiatric History/Meds/Treatments  Past Psychiatric History: Psychiatric Diagnosis: Antisocial Personality D/O, PSUD, substance-induced mood/psychosis  Current  Center: None  Previous Admissions: pt reports over 20 in his lifetime  History of self-harm/SA: remote hx of cutting his wrist 9 years ago  Past Psychiatric Meds/Treatments: Denies; pt most recently discharged on Zyprexa 10mg  Past Violence/Victimization History: Pt's chart is flagged as a violence risk - known to use intimidation techniques while admitted to avoid engaging in tx    Current Mental Health Contacts   Name/Phone Number: None   Last Appointment Date: None  Provider Name/Phone Number: None  Provider Last Appointment Date: None    Support System: Community, Extended family    Living Arrangement: Homeless         Income Information  Employment Status  for: Patient  Employment Status: Disabled  Income Source: Disability    [employer] Service/Education History  Current or Previous  Service: None  Education Level:  (did not assess)    Social/Cultural History  Social History: [place] Citizen: yes  Payee: pt's grandmother is his payee   Guardian/POA: Self  Cultural Requests During Hospitalization: none  Spiritual Requests During Hospitalization: none    Legal  Criminal Activity/ Legal Involvement Pertinent to Current Situation/ Hospitalization: None  Legal Concerns: per court docket, drug possession charge in 2017    Drug Screening  Have you used any substances (canabis, cocaine, heroin, hallucinogens, inhalants, etc.) in the past 12 months?: Yes  Have you used any prescription drugs other than prescribed in the past 12 months?: No  Is a toxicology screen needed?: Yes    Stage of Change  Stage of Change: Action  History of Treatment: Inpatient, Dual, Sober living  Type of Treatment Offered:  ([facility])  Treatment Offered: Accepted  Duration of Substance Use: unknown  Frequency of Substance Use: pt reports 4x/month, likely whenever not admitted to the hospital    Psychosocial  Psychosocial (WDL): Within Defined Limits    Orientation  Orientation Level: Oriented X4    General Appearance  Motor Activity: Unremarkable  Speech Pattern:  (Unremarkable)  General Attitude: Cooperative, Hostile  Appearance/Hygiene: Unremarkable    Thought Process  Coherency:  (goal-oriented)  Content: Blaming others  Delusions:  (None)  Perception: Not altered  Hallucination: None  Judgment/Insight:  (Limited at baseline)  Confusion: None  Cognition: Appropriate judgement, Appropriate safety awareness, Appropriate attention/concentration    Sleep Pattern  Sleep Pattern: Restlessness (2/2 stimulant abuse)    Risk Factors  Self Harm/Suicidal Ideation Plan: vague SI, no plan/intent  Previous Self Harm/Suicidal Plans: remote hx of cutting his wrist 9 years ago  Risk Factors: Male, Personality  disorder (antisocial, borderline), Substance abuse    Violence Risk Assessment  Assessment of Violence: On admission  Thoughts of Harm to Others: Yes - currently present  Description of Violence Behavior: None noted  Homicidal ideation: Yes - currently present (pt is a chronically elevated risk to others given reports of HI at [facility]; no current plan/intent or individual identified. Currently low acute risk..)  Current Homicidal Intent: No  Current Homicidal Plan: No  Access to Homicidal Means: No  Identified Victim: None identified  History of Harm to Others: No  Access to Weapons: No  Criminal Charges Pending: No    Ability to Assess Risk Screen  Risk Screen - Ability to Assess: Able to be screened  [place] Suicide Severity Rating Scale (Screener/Recent Self-Report)  1. Wish to be Dead (Past 1 Month): No  2. Non-Specific Active Suicidal Thoughts (Past 1 Month): Yes  3. Active Suicidal Ideation with any Methods (Not Plan) Without Intent to Act (Past 1 Month): No  4. Active Suicidal Ideation with Some Intent to Act, Without Specific Plan (Past 1 Month): No  5. Active Suicidal Ideation with Specific Plan and Intent (Past 1 Month): No  6. Suicidal Behavior (Lifetime): Yes  6. Suicidal Behavior (3 Months): No  Calculated C-SSRS Risk Score (Lifetime/Recent): Moderate Risk  Step 1: Risk Factors  Current & Past Psychiatric Dx: Mood disorder, Alcohol/substance abuse disorders, Cluster B personality disorders or traits (i.e., borderline, antisocial, histrionic & narcissistic), Conduct problems (antisocial behavior, aggression, impulsivity)  Precipitants/Stressors: Substance intoxication or withdrawal, Pending incarceration or homelessness  Change in Treatment: Non-compliant or not receiving treatment  Access to Lethal Methods : No  Step 2: Protective Factors   Protective Factors Internal: Frustration tolerance, Identifies reasons for living  Protective Factors External: Cultural, spiritual and/or moral attitudes against  suicide  Step 3: Suicidal Ideation Intensity  How Many Times Have You Had These Thoughts: 2-5 times in a week  When You Have the Thoughts How Long do They Last : Fleeting - few seconds or minutes  Could/Can You Stop Thinking About Killing Yourself or Wanting to Die if You Want to: Can control thoughts with little difficulty  Are There Things - Anyone or Anything - That Stopped You From Wanting to Die or Acting on: Deterrents probably stopped you  What Sort of Reasons Did You Have For Thinking About Wanting to Die or Killing Yourself: Completely to get attention, revenge, or a reaction from others  Total Score: 9  Step 5: Documentation  Risk Level: Moderate suicide risk (Patient is a chronically elevated risk at baseline due to lifetime history; currently low acute risk. Discussed with Dr. Davidson)    Psychiatric Impression and Plan of Care  Assessment and Plan:     Upon assessment the patient remained calm and superficially cooperative. As soon as this writer introduced self as part of the psychiatric team, the patient immediately stated “I want to be admitted to [facility] and then stay in sober living for a while”. The patient was resistant to efforts to discuss any/all psychiatric symptomology, frequently challenging this writer in ways such as “why are you asking things like that” and attempting to redirect the conversation to address his social needs (i.e. housing, food). Patient continued to endorse vague, non-committal SI/HI but no plan or intent were elicited and no individual was identified. The patient remained congruous with his previously documented baseline and remained purposefully elusive with his reports. Patient denied access to firearms. When asked regarding AH/VH, patient reported “I see you and me”. No delusions were elicited and patient did not appear to be responding to internal stimuli. No acute symptoms of depression, psychosis, or letty were elicited. Despite his reports of SI,  "patient remained spontaneously, independently, and concretely future/goal-oriented and help-seeking. He demonstrates continued resourcefulness, behaviors consistent with self-preservation, and a working knowledge of all resources available to him at this time. The patient reports being “finally done” with polysubstance use and demonstrated recognition of its impact on his frequent presentations, again reporting “my goal is to get to [facility] or [facility] and then I want to get with [facility] to help with housing once I get out”. [facility]     Diagnostic Impression: Antisocial Personality D/O, PSUD     Psychiatric Impression and Plan for Care: Patient is presenting at his well-documented baseline with no evidence of acute decompensation. Patient would benefit from further efforts to maintain sobriety. [facility] Davidson who is in agreement.     Specific Resources Provided to Patient: [facility]    Outcome/Disposition  Patient's Perception of Outcome Achieved: \"Admit me\"  Assessment, Recommendations and Risk Level Reviewed with: Dr. Davidson  Contact Name: none provided  Contact Number(s): -  Contact Relationship: -  EPAT Assessment Completed Date: 07/07/24  EPAT Assessment Completed Time: 0933      "

## 2024-07-07 NOTE — DISCHARGE INSTRUCTIONS
Please return to the emergency department, should you have any worsening symptoms, are unable to get an appointment with your primary care physician within the discussed time frame, or have any further concerns.     Please follow up with: Primary care physician as needed.    You may take ibuprofen or tylenol for pain. You may alternate ibuprofen and tylenol every 6 hours for better pain control.    Do not exceed 2400mg of ibuprofen or 4000mg of tylenol in a 24 hour period.      --  For adult appointments/referrals:  [phone] ([phone])    For pediatric appointments/referrals:  [address] [place] [address]IDS ([phone])

## 2024-07-07 NOTE — PROGRESS NOTES
Emergency Medicine Transition of Care Note.      I received Nathan Vaughn in signout from Dr. Deleon.  Please see the previous ED provider note for all HPI, PE and MDM up to the time of signout at 0700. This is in addition to the primary record. In brief Nathan Vaughn is an 39 y.o. male presenting for   Chief Complaint   Patient presents with    Addiction Problem    Suicidal    .      At the time of signout we were awaiting: EPAT evaluation and [facility]    ED Course as of 07/07/24 0949   Sun Jul 07, 2024   0030 Med clear [KR]   0649 Electrocardiogram, 12-lead  Normal sinus rhythm rate 71, normal axis.  No acute ST segment elevation or depressions.  , QRS 86, QTc 456. [KR]   0649 Spoke with patient, he continues to endorse suicidal ideation with thoughts of wanting to harm himself.  Still pending UA however will recommend evaluation by EPAT [KR]      ED Course User Index  [KR] Swati Deleon DO         Diagnoses as of 07/07/24 0949   Polysubstance use disorder       Final diagnoses:   None       Medical Decision Making    Briefly, this is a 39-year-old male presented to the ED for polysubstance use presents to the ED significantly [place], concern for decompensated psych.  Has prior history of antisocial disorder, received Suboxone Ativan on arrival due to agitation, with appropriate improvement of mood.    He was denies any, pending EPAT evaluation and [facility].  EPAT cleared patient, safe to go home given that he does not have any active SI or HI or auditory visual hallucinations.  Seen by [facility], noted that he does not want to go to rehab today, and that father be significantly difficult to have him placed given his psychiatric/mood instability.  Patient was given resources upon discharge.  Advised to return the emergency department should he have any further concerns.      Bob Light,   Emergency Medicine , PGY-2    I reviewed the case with the attending ED physician. The attending ED physician  agrees with the plan. Patient and/or patient´s representative was counseled regarding labs, imaging, likely diagnosis, and plan. All questions were answered.    Disclaimer: This note was dictated by speech recognition.  Attempt at proofreading was made to minimize errors.  Errors in transcription may be present.  Please call if questions.

## 2024-07-07 NOTE — ED TRIAGE NOTES
Pt states that he has substance abuse disorder that makes him want to kill himself. Pt was riding the bus all day and was pulled off by [place] police. Pt states that he took cocaine, meth, marijuana, and ETOH

## 2024-07-08 LAB
ATRIAL RATE: 71 BPM
P AXIS: 69 DEGREES
P OFFSET: 206 MS
P ONSET: 150 MS
PR INTERVAL: 136 MS
Q ONSET: 218 MS
QRS COUNT: 11 BEATS
QRS DURATION: 86 MS
QT INTERVAL: 420 MS
QTC CALCULATION(BAZETT): 456 MS
QTC FREDERICIA: 444 MS
R AXIS: 80 DEGREES
T AXIS: 60 DEGREES
T OFFSET: 428 MS
VENTRICULAR RATE: 71 BPM

## 2024-08-08 ENCOUNTER — CLINICAL SUPPORT (OUTPATIENT)
Dept: EMERGENCY MEDICINE | Facility: HOSPITAL | Age: 39
End: 2024-08-08
Payer: MEDICARE

## 2024-08-08 ENCOUNTER — HOSPITAL ENCOUNTER (EMERGENCY)
Facility: HOSPITAL | Age: 39
Discharge: HOME | End: 2024-08-09
Attending: STUDENT IN AN ORGANIZED HEALTH CARE EDUCATION/TRAINING PROGRAM
Payer: MEDICARE

## 2024-08-08 VITALS
OXYGEN SATURATION: 98 % | SYSTOLIC BLOOD PRESSURE: 131 MMHG | WEIGHT: 220 LBS | TEMPERATURE: 99.1 F | DIASTOLIC BLOOD PRESSURE: 74 MMHG | HEIGHT: 70 IN | HEART RATE: 102 BPM | RESPIRATION RATE: 14 BRPM | BODY MASS INDEX: 31.5 KG/M2

## 2024-08-08 DIAGNOSIS — F60.2 ANTISOCIAL PERSONALITY DISORDER (MULTI): ICD-10-CM

## 2024-08-08 DIAGNOSIS — F29 PSYCHOSIS, UNSPECIFIED PSYCHOSIS TYPE (MULTI): Primary | ICD-10-CM

## 2024-08-08 LAB
ALBUMIN SERPL BCP-MCNC: 4.1 G/DL (ref 3.4–5)
ALP SERPL-CCNC: 85 U/L (ref 33–120)
ALT SERPL W P-5'-P-CCNC: 20 U/L (ref 10–52)
ANION GAP SERPL CALC-SCNC: 12 MMOL/L (ref 10–20)
APAP SERPL-MCNC: <10 UG/ML
AST SERPL W P-5'-P-CCNC: 21 U/L (ref 9–39)
ATRIAL RATE: 101 BPM
BASOPHILS # BLD AUTO: 0.03 X10*3/UL (ref 0–0.1)
BASOPHILS NFR BLD AUTO: 0.7 %
BILIRUB SERPL-MCNC: 0.9 MG/DL (ref 0–1.2)
BUN SERPL-MCNC: 16 MG/DL (ref 6–23)
CALCIUM SERPL-MCNC: 9.3 MG/DL (ref 8.6–10.6)
CHLORIDE SERPL-SCNC: 104 MMOL/L (ref 98–107)
CO2 SERPL-SCNC: 27 MMOL/L (ref 21–32)
CREAT SERPL-MCNC: 1.31 MG/DL (ref 0.5–1.3)
EGFRCR SERPLBLD CKD-EPI 2021: 72 ML/MIN/1.73M*2
EOSINOPHIL # BLD AUTO: 0.1 X10*3/UL (ref 0–0.7)
EOSINOPHIL NFR BLD AUTO: 2.4 %
ERYTHROCYTE [DISTWIDTH] IN BLOOD BY AUTOMATED COUNT: 13.3 % (ref 11.5–14.5)
ETHANOL SERPL-MCNC: <10 MG/DL
GLUCOSE SERPL-MCNC: 101 MG/DL (ref 74–99)
HCT VFR BLD AUTO: 35 % (ref 41–52)
HGB BLD-MCNC: 12.1 G/DL (ref 13.5–17.5)
HOLD SPECIMEN: NORMAL
HOLD SPECIMEN: NORMAL
IMM GRANULOCYTES # BLD AUTO: 0.01 X10*3/UL (ref 0–0.7)
IMM GRANULOCYTES NFR BLD AUTO: 0.2 % (ref 0–0.9)
LYMPHOCYTES # BLD AUTO: 1.3 X10*3/UL (ref 1.2–4.8)
LYMPHOCYTES NFR BLD AUTO: 31.8 %
MCH RBC QN AUTO: 26.9 PG (ref 26–34)
MCHC RBC AUTO-ENTMCNC: 34.6 G/DL (ref 32–36)
MCV RBC AUTO: 78 FL (ref 80–100)
MONOCYTES # BLD AUTO: 0.31 X10*3/UL (ref 0.1–1)
MONOCYTES NFR BLD AUTO: 7.6 %
NEUTROPHILS # BLD AUTO: 2.34 X10*3/UL (ref 1.2–7.7)
NEUTROPHILS NFR BLD AUTO: 57.3 %
NRBC BLD-RTO: 0 /100 WBCS (ref 0–0)
P AXIS: 40 DEGREES
P OFFSET: 199 MS
P ONSET: 139 MS
PLATELET # BLD AUTO: 411 X10*3/UL (ref 150–450)
POTASSIUM SERPL-SCNC: 3.3 MMOL/L (ref 3.5–5.3)
PR INTERVAL: 156 MS
PROT SERPL-MCNC: 7.8 G/DL (ref 6.4–8.2)
Q ONSET: 217 MS
QRS COUNT: 16 BEATS
QRS DURATION: 86 MS
QT INTERVAL: 376 MS
QTC CALCULATION(BAZETT): 487 MS
QTC FREDERICIA: 447 MS
R AXIS: 53 DEGREES
RBC # BLD AUTO: 4.5 X10*6/UL (ref 4.5–5.9)
SALICYLATES SERPL-MCNC: <3 MG/DL
SODIUM SERPL-SCNC: 140 MMOL/L (ref 136–145)
T AXIS: 41 DEGREES
T OFFSET: 405 MS
VENTRICULAR RATE: 101 BPM
WBC # BLD AUTO: 4.1 X10*3/UL (ref 4.4–11.3)

## 2024-08-08 PROCEDURE — 96372 THER/PROPH/DIAG INJ SC/IM: CPT | Performed by: STUDENT IN AN ORGANIZED HEALTH CARE EDUCATION/TRAINING PROGRAM

## 2024-08-08 PROCEDURE — 80143 DRUG ASSAY ACETAMINOPHEN: CPT

## 2024-08-08 PROCEDURE — 2500000004 HC RX 250 GENERAL PHARMACY W/ HCPCS (ALT 636 FOR OP/ED): Performed by: STUDENT IN AN ORGANIZED HEALTH CARE EDUCATION/TRAINING PROGRAM

## 2024-08-08 PROCEDURE — 84075 ASSAY ALKALINE PHOSPHATASE: CPT

## 2024-08-08 PROCEDURE — 85025 COMPLETE CBC W/AUTO DIFF WBC: CPT

## 2024-08-08 PROCEDURE — 99285 EMERGENCY DEPT VISIT HI MDM: CPT

## 2024-08-08 PROCEDURE — 2500000004 HC RX 250 GENERAL PHARMACY W/ HCPCS (ALT 636 FOR OP/ED)

## 2024-08-08 PROCEDURE — 96372 THER/PROPH/DIAG INJ SC/IM: CPT

## 2024-08-08 PROCEDURE — 36415 COLL VENOUS BLD VENIPUNCTURE: CPT

## 2024-08-08 PROCEDURE — 93005 ELECTROCARDIOGRAM TRACING: CPT

## 2024-08-08 RX ORDER — MIDAZOLAM HYDROCHLORIDE 1 MG/ML
5 INJECTION INTRAMUSCULAR; INTRAVENOUS ONCE
Status: COMPLETED | OUTPATIENT
Start: 2024-08-08 | End: 2024-08-08

## 2024-08-08 RX ORDER — HALOPERIDOL 5 MG/ML
INJECTION INTRAMUSCULAR
Status: COMPLETED
Start: 2024-08-08 | End: 2024-08-08

## 2024-08-08 RX ORDER — HALOPERIDOL 5 MG/ML
5 INJECTION INTRAMUSCULAR ONCE
Status: COMPLETED | OUTPATIENT
Start: 2024-08-08 | End: 2024-08-08

## 2024-08-08 RX ORDER — MIDAZOLAM HYDROCHLORIDE 5 MG/ML
INJECTION, SOLUTION INTRAMUSCULAR; INTRAVENOUS
Status: COMPLETED
Start: 2024-08-08 | End: 2024-08-08

## 2024-08-08 SDOH — HEALTH STABILITY: MENTAL HEALTH: DELUSIONS: PARANOID

## 2024-08-08 SDOH — HEALTH STABILITY: MENTAL HEALTH
BEHAVIORS/MOOD: AGITATED;ANXIOUS;APPEARS INTOXICATED;AGGRESSIVE PHYSICALLY, SELF;AGGRESSIVE VERBALLY, OTHERS;DEFIANT;DELUSIONS;INCONGRUENT;FEARFUL;FLIGHT OF IDEAS;IMPULSIVE;HYPER-VERBAL;HOSTILE;HALLUCINATIONS;LABILE;PARANOID;PACING

## 2024-08-08 SDOH — HEALTH STABILITY: MENTAL HEALTH: CONTENT: DELUSIONS

## 2024-08-08 ASSESSMENT — LIFESTYLE VARIABLES
EVER HAD A DRINK FIRST THING IN THE MORNING TO STEADY YOUR NERVES TO GET RID OF A HANGOVER: NO
HAVE YOU EVER FELT YOU SHOULD CUT DOWN ON YOUR DRINKING: NO
TOTAL SCORE: 0
HAVE PEOPLE ANNOYED YOU BY CRITICIZING YOUR DRINKING: NO
EVER FELT BAD OR GUILTY ABOUT YOUR DRINKING: NO

## 2024-08-08 ASSESSMENT — COLUMBIA-SUICIDE SEVERITY RATING SCALE - C-SSRS
6. HAVE YOU EVER DONE ANYTHING, STARTED TO DO ANYTHING, OR PREPARED TO DO ANYTHING TO END YOUR LIFE?: NO
1. IN THE PAST MONTH, HAVE YOU WISHED YOU WERE DEAD OR WISHED YOU COULD GO TO SLEEP AND NOT WAKE UP?: NO
2. HAVE YOU ACTUALLY HAD ANY THOUGHTS OF KILLING YOURSELF?: NO

## 2024-08-08 ASSESSMENT — PAIN - FUNCTIONAL ASSESSMENT: PAIN_FUNCTIONAL_ASSESSMENT: 0-10

## 2024-08-08 ASSESSMENT — PAIN SCALES - GENERAL: PAINLEVEL_OUTOF10: 0 - NO PAIN

## 2024-08-08 NOTE — ED TRIAGE NOTES
"Patient presents to the Emergency department for a psychiatric evaluation. Patient was brought in by [place] Police for erratic behavior. Per [place] [employer], patient was acting aggressive on scene. Upon arrival patient, patient was exhibiting aggressive behavior due to paranoid delusions. Patient states the spinning sun in the adin is out to get him. When attempting to deescalate the situation, patient became aggressive and stated \"How would you like it if your mom \". Patient states he has been off of his medications for some time, but does not know what medications or how long he's been off of it. Patient refusing to participate in triage assessment and will not answer questions. [employer] at bedside, patient given 5mg Midazolam IM and 5mg of Haloperidol IM via verbal order from Dr. Abdi. Patient changed into gown, belongings secured. Patient on video monitoring. Patient given water.   "

## 2024-08-09 PROCEDURE — 99223 1ST HOSP IP/OBS HIGH 75: CPT

## 2024-08-09 RX ORDER — ARIPIPRAZOLE 10 MG/1
10 TABLET ORAL DAILY
Qty: 30 TABLET | Refills: 0 | Status: SHIPPED | OUTPATIENT
Start: 2024-08-09 | End: 2024-08-09

## 2024-08-09 RX ORDER — POTASSIUM CHLORIDE 20 MEQ/1
40 TABLET, EXTENDED RELEASE ORAL ONCE
Status: DISCONTINUED | OUTPATIENT
Start: 2024-08-09 | End: 2024-08-09 | Stop reason: HOSPADM

## 2024-08-09 RX ORDER — SERTRALINE HYDROCHLORIDE 50 MG/1
50 TABLET, FILM COATED ORAL DAILY
Qty: 20 TABLET | Refills: 0 | Status: SHIPPED | OUTPATIENT
Start: 2024-08-09 | End: 2024-08-29

## 2024-08-09 RX ORDER — ARIPIPRAZOLE 10 MG/1
10 TABLET ORAL DAILY
Qty: 30 TABLET | Refills: 0 | Status: SHIPPED | OUTPATIENT
Start: 2024-08-09 | End: 2024-09-08

## 2024-08-09 RX ORDER — FLUOXETINE 10 MG/1
30 TABLET ORAL DAILY
Qty: 30 TABLET | Refills: 0 | Status: SHIPPED | OUTPATIENT
Start: 2024-08-09 | End: 2024-08-19

## 2024-08-09 RX ORDER — FLUOXETINE 10 MG/1
30 TABLET ORAL DAILY
Qty: 30 TABLET | Refills: 0 | Status: SHIPPED | OUTPATIENT
Start: 2024-08-09 | End: 2024-08-09

## 2024-08-09 RX ORDER — OLANZAPINE 5 MG/1
5 TABLET, ORALLY DISINTEGRATING ORAL NIGHTLY
Qty: 30 TABLET | Refills: 0 | Status: SHIPPED | OUTPATIENT
Start: 2024-08-09 | End: 2024-08-09

## 2024-08-09 RX ORDER — SERTRALINE HYDROCHLORIDE 50 MG/1
50 TABLET, FILM COATED ORAL DAILY
Qty: 20 TABLET | Refills: 0 | Status: SHIPPED | OUTPATIENT
Start: 2024-08-09 | End: 2024-08-09

## 2024-08-09 RX ORDER — OLANZAPINE 5 MG/1
5 TABLET, ORALLY DISINTEGRATING ORAL NIGHTLY
Qty: 30 TABLET | Refills: 0 | Status: SHIPPED | OUTPATIENT
Start: 2024-08-09 | End: 2024-09-08
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
## 2024-08-09 NOTE — DISCHARGE INSTRUCTIONS
Who presents to ED for psychiatric evaluation.  Your presentation is noted to be likely behavioral.  Follow-up with psychiatry outpatient.  Psychiatry clinic phone number is [phone].

## 2024-08-09 NOTE — ED PROVIDER NOTES
HPI   Chief Complaint   Patient presents with    Psychiatric Evaluation       HPI  Patient is a 39-year-old past medical history of schizophrenia and bipolar disorder presented with hallucination and paranoia.  According to patient, he was released from a psych facility on Monday and has not been taking his medication.  Patient said that the sun is after him and that we need to get rid of the sun.  Patient denies any fever, nausea and vomiting.  Patient denies any suicidal ideation but does acknowledge that he been hearing voices.    Patient History   Past Medical History:   Diagnosis Date    Aggressive behavior 07/26/2019    Cannabis use disorder 03/15/2023    Cellulitis 11/01/2021    Formatting of this note might be different from the original. Last Assessment & Plan: Formatting of this note might be different from the original. Assessment: Secondary to dog bite PLAN: - See plan for dog bite Last Assessment & Plan: Formatting of this note might be different from the original. Assessment: Secondary to dog bite PLAN: - See plan for dog bite    Electrolyte disorder 07/23/2019    Homicidal thoughts 11/04/2023    Methamphetamine intoxication (Multi) 07/24/2019    Polysubstance dependence, non-opioid, in remission (Multi) 04/15/2022    Formatting of this note might be different from the original. Dx 2014 with amphetamine, cannabis, alcohol, and hallucinogen mixed abuse/dependence Last Assessment & Plan: Formatting of this note might be different from the original. A: active severe problem, with unclear dependency/severity. 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 Last Assessment & Plan: Formatting of this note might be different from the  original. Assessment: - Chronically elevated platelet count >400k since 9/30/2021 - currently not on any medications PLAN: - start aspirin 81 mg daily Last Assessment & Plan: Formatting of this note might be different from the original. Assessment: - Chronically     No past surgical history on file.  No family history on file.  Social History     Tobacco Use    Smoking status: Every Day     Current packs/day: 1.00     Average packs/day: 1 pack/day for 20.0 years (20.0 ttl pk-yrs)     Types: Cigarettes    Smokeless tobacco: Never   Substance Use Topics    Alcohol use: Yes    Drug use: Yes     Types: Methamphetamines, Cocaine, Marijuana       Physical Exam   ED Triage Vitals [08/08/24 1621]   Temperature Heart Rate Respirations BP   37.3 °C (99.1 °F) (!) 128 (!) 22 128/74      Pulse Ox Temp src Heart Rate Source Patient Position   100 % -- -- --      BP Location FiO2 (%)     -- --       Physical Exam  Constitutional:       Appearance: Normal appearance.   Cardiovascular:      Rate and Rhythm: Normal rate and regular rhythm.      Pulses: Normal pulses.      Heart sounds: Normal heart sounds.   Pulmonary:      Effort: Pulmonary effort is normal.      Breath sounds: Normal breath sounds.   Abdominal:      General: Abdomen is flat. Bowel sounds are normal.      Palpations: Abdomen is soft.   Musculoskeletal:      Cervical back: Normal range of motion.   Neurological:      Mental Status: He is alert.   Psychiatric:      Comments: Pressured speech, disoriented, belligerent, paranoia, erratic behavior       ED Course & [facility]   ED Course as of 08/09/24 0810   Thu Aug 08, 2024   2017 Signout to me at 5 PM pending reevaluation  Initially presented to the ED agitated and required sedation.  Labs pending  Will reassess once patient is awake  Patient had recent psychiatric hospitalization and will likely require patch clearance prior to dispo [FN]      ED Course User Index  [FN] Jaelyn Crawford MD         Diagnoses as of 08/09/24 0810    Psychosis, unspecified psychosis type (Multi)             Medical Decision Making  Patient is a 39-year-old past medical history of schizophrenia and bipolar disorder presented with hallucination and paranoia. According to patient, he was released from a psych facility on Monday and has not been taking his medication.  Patient said that the sun is after him and that we need to get rid of the sun. Patient denies any fever, nausea and vomiting. Patient denies any suicidal ideation but does acknowledge that he been hearing voices.  Patient got belligerent and agitated.  Patient was not redirectable so patient was given 5 mg of Haldol and 5 mg of Versed IM.  After the medication patient was peaceful and went to sleep.  At bedside, patient was hemodynamically stable and sleeping.  Patient physical exam was benign.  EPAT was consulted.  They wanted to wait till patient is awake to talk to him. Patient CBC, CMP, and toxicology were all unremarkable.  The oncoming physician will follow up with patient care.      Procedure  Procedures     Neo Abdi MD  Resident  08/09/24 0864       Tonny Ramos MD  08/09/24 1315

## 2024-08-30 ENCOUNTER — CLINICAL SUPPORT (OUTPATIENT)
Dept: EMERGENCY MEDICINE | Facility: HOSPITAL | Age: 39
End: 2024-08-30
Payer: MEDICARE

## 2024-08-30 ENCOUNTER — HOSPITAL ENCOUNTER (OUTPATIENT)
Facility: HOSPITAL | Age: 39
Setting detail: OBSERVATION
Discharge: OTHER INSTITUTION | End: 2024-08-31
Attending: EMERGENCY MEDICINE | Admitting: EMERGENCY MEDICINE
Payer: MEDICARE

## 2024-08-30 DIAGNOSIS — F99 PSYCHIATRIC COMPLAINT: Primary | ICD-10-CM

## 2024-08-30 PROBLEM — R45.851 SUICIDAL IDEATION: Status: ACTIVE | Noted: 2024-08-30

## 2024-08-30 LAB
ALBUMIN SERPL BCP-MCNC: 5 G/DL (ref 3.4–5)
ALP SERPL-CCNC: 74 U/L (ref 33–120)
ALT SERPL W P-5'-P-CCNC: 38 U/L (ref 10–52)
AMPHETAMINES UR QL SCN: ABNORMAL
ANION GAP SERPL CALC-SCNC: 18 MMOL/L (ref 10–20)
APAP SERPL-MCNC: <10 UG/ML
AST SERPL W P-5'-P-CCNC: 42 U/L (ref 9–39)
BARBITURATES UR QL SCN: ABNORMAL
BASOPHILS # BLD AUTO: 0.04 X10*3/UL (ref 0–0.1)
BASOPHILS NFR BLD AUTO: 0.4 %
BENZODIAZ UR QL SCN: ABNORMAL
BILIRUB SERPL-MCNC: 1.5 MG/DL (ref 0–1.2)
BUN SERPL-MCNC: 18 MG/DL (ref 6–23)
BZE UR QL SCN: ABNORMAL
CALCIUM SERPL-MCNC: 9.9 MG/DL (ref 8.6–10.6)
CANNABINOIDS UR QL SCN: ABNORMAL
CHLORIDE SERPL-SCNC: 99 MMOL/L (ref 98–107)
CO2 SERPL-SCNC: 25 MMOL/L (ref 21–32)
CREAT SERPL-MCNC: 1.25 MG/DL (ref 0.5–1.3)
EGFRCR SERPLBLD CKD-EPI 2021: 76 ML/MIN/1.73M*2
EOSINOPHIL # BLD AUTO: 0.11 X10*3/UL (ref 0–0.7)
EOSINOPHIL NFR BLD AUTO: 1.2 %
ERYTHROCYTE [DISTWIDTH] IN BLOOD BY AUTOMATED COUNT: 13.9 % (ref 11.5–14.5)
ERYTHROCYTE [DISTWIDTH] IN BLOOD BY AUTOMATED COUNT: 13.9 % (ref 11.5–14.5)
ETHANOL SERPL-MCNC: <10 MG/DL
FENTANYL+NORFENTANYL UR QL SCN: ABNORMAL
GLUCOSE SERPL-MCNC: 117 MG/DL (ref 74–99)
HCT VFR BLD AUTO: 38.8 % (ref 41–52)
HCT VFR BLD AUTO: 38.8 % (ref 41–52)
HGB BLD-MCNC: 13.5 G/DL (ref 13.5–17.5)
HGB BLD-MCNC: 13.5 G/DL (ref 13.5–17.5)
HOLD SPECIMEN: NORMAL
IMM GRANULOCYTES # BLD AUTO: 0.02 X10*3/UL (ref 0–0.7)
IMM GRANULOCYTES NFR BLD AUTO: 0.2 % (ref 0–0.9)
LYMPHOCYTES # BLD AUTO: 2.58 X10*3/UL (ref 1.2–4.8)
LYMPHOCYTES NFR BLD AUTO: 28.9 %
MAGNESIUM SERPL-MCNC: 2.32 MG/DL (ref 1.6–2.4)
MCH RBC QN AUTO: 27.2 PG (ref 26–34)
MCH RBC QN AUTO: 27.2 PG (ref 26–34)
MCHC RBC AUTO-ENTMCNC: 34.8 G/DL (ref 32–36)
MCHC RBC AUTO-ENTMCNC: 34.8 G/DL (ref 32–36)
MCV RBC AUTO: 78 FL (ref 80–100)
MCV RBC AUTO: 78 FL (ref 80–100)
METHADONE UR QL SCN: ABNORMAL
MONOCYTES # BLD AUTO: 0.97 X10*3/UL (ref 0.1–1)
MONOCYTES NFR BLD AUTO: 10.9 %
NEUTROPHILS # BLD AUTO: 5.2 X10*3/UL (ref 1.2–7.7)
NEUTROPHILS NFR BLD AUTO: 58.4 %
NRBC BLD-RTO: 0 /100 WBCS (ref 0–0)
NRBC BLD-RTO: 0 /100 WBCS (ref 0–0)
OPIATES UR QL SCN: ABNORMAL
OXYCODONE+OXYMORPHONE UR QL SCN: ABNORMAL
PCP UR QL SCN: ABNORMAL
PLATELET # BLD AUTO: 395 X10*3/UL (ref 150–450)
PLATELET # BLD AUTO: 395 X10*3/UL (ref 150–450)
POTASSIUM SERPL-SCNC: 3.7 MMOL/L (ref 3.5–5.3)
PROT SERPL-MCNC: 8.8 G/DL (ref 6.4–8.2)
RBC # BLD AUTO: 4.96 X10*6/UL (ref 4.5–5.9)
RBC # BLD AUTO: 4.96 X10*6/UL (ref 4.5–5.9)
SALICYLATES SERPL-MCNC: <3 MG/DL
SODIUM SERPL-SCNC: 138 MMOL/L (ref 136–145)
WBC # BLD AUTO: 8.9 X10*3/UL (ref 4.4–11.3)
WBC # BLD AUTO: 8.9 X10*3/UL (ref 4.4–11.3)

## 2024-08-30 PROCEDURE — 93005 ELECTROCARDIOGRAM TRACING: CPT

## 2024-08-30 PROCEDURE — 2500000002 HC RX 250 W HCPCS SELF ADMINISTERED DRUGS (ALT 637 FOR MEDICARE OP, ALT 636 FOR OP/ED)

## 2024-08-30 PROCEDURE — 2500000001 HC RX 250 WO HCPCS SELF ADMINISTERED DRUGS (ALT 637 FOR MEDICARE OP)

## 2024-08-30 PROCEDURE — 99285 EMERGENCY DEPT VISIT HI MDM: CPT

## 2024-08-30 PROCEDURE — 96372 THER/PROPH/DIAG INJ SC/IM: CPT

## 2024-08-30 PROCEDURE — 80320 DRUG SCREEN QUANTALCOHOLS: CPT

## 2024-08-30 PROCEDURE — 83735 ASSAY OF MAGNESIUM: CPT | Performed by: EMERGENCY MEDICINE

## 2024-08-30 PROCEDURE — 80053 COMPREHEN METABOLIC PANEL: CPT | Performed by: EMERGENCY MEDICINE

## 2024-08-30 PROCEDURE — 2500000004 HC RX 250 GENERAL PHARMACY W/ HCPCS (ALT 636 FOR OP/ED)

## 2024-08-30 PROCEDURE — 80307 DRUG TEST PRSMV CHEM ANLYZR: CPT | Performed by: EMERGENCY MEDICINE

## 2024-08-30 PROCEDURE — 36415 COLL VENOUS BLD VENIPUNCTURE: CPT | Performed by: EMERGENCY MEDICINE

## 2024-08-30 PROCEDURE — 99285 EMERGENCY DEPT VISIT HI MDM: CPT | Performed by: EMERGENCY MEDICINE

## 2024-08-30 PROCEDURE — 85025 COMPLETE CBC W/AUTO DIFF WBC: CPT | Performed by: EMERGENCY MEDICINE

## 2024-08-30 RX ORDER — DIPHENHYDRAMINE HCL 25 MG
25 CAPSULE ORAL ONCE
Status: COMPLETED | OUTPATIENT
Start: 2024-08-30 | End: 2024-08-30

## 2024-08-30 RX ORDER — HALOPERIDOL 5 MG/ML
5 INJECTION INTRAMUSCULAR ONCE
Status: COMPLETED | OUTPATIENT
Start: 2024-08-30 | End: 2024-08-30

## 2024-08-30 RX ORDER — LORAZEPAM 2 MG/ML
1 INJECTION INTRAMUSCULAR ONCE
Status: COMPLETED | OUTPATIENT
Start: 2024-08-30 | End: 2024-08-30

## 2024-08-30 RX ORDER — OLANZAPINE 10 MG/2ML
5 INJECTION, POWDER, FOR SOLUTION INTRAMUSCULAR ONCE
Status: DISCONTINUED | OUTPATIENT
Start: 2024-08-30 | End: 2024-08-30

## 2024-08-30 RX ORDER — OLANZAPINE 5 MG/1
5 TABLET ORAL ONCE
Status: COMPLETED | OUTPATIENT
Start: 2024-08-30 | End: 2024-08-30

## 2024-08-30 RX ADMIN — DIPHENHYDRAMINE HYDROCHLORIDE 25 MG: 25 CAPSULE ORAL at 04:20

## 2024-08-30 RX ADMIN — OLANZAPINE 5 MG: 5 TABLET, FILM COATED ORAL at 19:07

## 2024-08-30 RX ADMIN — LORAZEPAM 1 MG: 2 INJECTION, SOLUTION INTRAMUSCULAR; INTRAVENOUS at 04:21

## 2024-08-30 RX ADMIN — HALOPERIDOL LACTATE 5 MG: 5 INJECTION, SOLUTION INTRAMUSCULAR at 04:21

## 2024-08-30 SDOH — HEALTH STABILITY: MENTAL HEALTH: SUICIDAL BEHAVIOR (LIFETIME): YES

## 2024-08-30 SDOH — HEALTH STABILITY: MENTAL HEALTH: IN THE PAST FEW WEEKS, HAVE YOU FELT THAT YOU OR YOUR FAMILY WOULD BE BETTER OFF IF YOU WERE DEAD?: YES

## 2024-08-30 SDOH — HEALTH STABILITY: MENTAL HEALTH: DEPRESSION SYMPTOMS: FEELINGS OF HELPLESSNESS;FEELINGS OF HOPELESSESS;FEELINGS OF WORTHLESSNESS

## 2024-08-30 SDOH — HEALTH STABILITY: MENTAL HEALTH: ARE YOU HAVING THOUGHTS OF KILLING YOURSELF RIGHT NOW?: YES

## 2024-08-30 SDOH — HEALTH STABILITY: MENTAL HEALTH: HOW DID YOU TRY TO KILL YOURSELF?: CUT WRISTS

## 2024-08-30 SDOH — HEALTH STABILITY: MENTAL HEALTH: IN THE PAST FEW WEEKS, HAVE YOU WISHED YOU WERE DEAD?: YES

## 2024-08-30 SDOH — HEALTH STABILITY: MENTAL HEALTH: IN THE PAST WEEK, HAVE YOU BEEN HAVING THOUGHTS ABOUT KILLING YOURSELF?: YES

## 2024-08-30 SDOH — HEALTH STABILITY: MENTAL HEALTH: WHEN DID YOU TRY TO KILL YOURSELF?: 2016

## 2024-08-30 SDOH — HEALTH STABILITY: MENTAL HEALTH: ANXIETY SYMPTOMS: GENERALIZED

## 2024-08-30 SDOH — HEALTH STABILITY: MENTAL HEALTH: NON-SPECIFIC ACTIVE SUICIDAL THOUGHTS (PAST 1 MONTH): YES

## 2024-08-30 SDOH — HEALTH STABILITY: MENTAL HEALTH: BEHAVIORS/MOOD: CALM;COOPERATIVE

## 2024-08-30 SDOH — HEALTH STABILITY: MENTAL HEALTH: ACTIVE SUICIDAL IDEATION WITH SOME INTENT TO ACT, WITHOUT SPECIFIC PLAN (PAST 1 MONTH): YES

## 2024-08-30 SDOH — HEALTH STABILITY: MENTAL HEALTH: HAVE YOU EVER TRIED TO KILL YOURSELF?: YES

## 2024-08-30 SDOH — ECONOMIC STABILITY: HOUSING INSECURITY: FEELS SAFE LIVING IN HOME: NO

## 2024-08-30 SDOH — HEALTH STABILITY: MENTAL HEALTH: WISH TO BE DEAD (PAST 1 MONTH): YES

## 2024-08-30 SDOH — HEALTH STABILITY: MENTAL HEALTH: ACTIVE SUICIDAL IDEATION WITH SPECIFIC PLAN AND INTENT (PAST 1 MONTH): NO

## 2024-08-30 SDOH — HEALTH STABILITY: MENTAL HEALTH: HALLUCINATION: VISUAL

## 2024-08-30 ASSESSMENT — COLUMBIA-SUICIDE SEVERITY RATING SCALE - C-SSRS
1. IN THE PAST MONTH, HAVE YOU WISHED YOU WERE DEAD OR WISHED YOU COULD GO TO SLEEP AND NOT WAKE UP?: YES
2. HAVE YOU ACTUALLY HAD ANY THOUGHTS OF KILLING YOURSELF?: YES
6. HAVE YOU EVER DONE ANYTHING, STARTED TO DO ANYTHING, OR PREPARED TO DO ANYTHING TO END YOUR LIFE?: YES
4. HAVE YOU HAD THESE THOUGHTS AND HAD SOME INTENTION OF ACTING ON THEM?: YES

## 2024-08-30 ASSESSMENT — PAIN - FUNCTIONAL ASSESSMENT
PAIN_FUNCTIONAL_ASSESSMENT: 0-10

## 2024-08-30 ASSESSMENT — LIFESTYLE VARIABLES
TOTAL SCORE: 0
HAVE YOU EVER FELT YOU SHOULD CUT DOWN ON YOUR DRINKING: NO
PRESCIPTION_ABUSE_PAST_12_MONTHS: NO
EVER FELT BAD OR GUILTY ABOUT YOUR DRINKING: NO
HAVE PEOPLE ANNOYED YOU BY CRITICIZING YOUR DRINKING: NO
EVER HAD A DRINK FIRST THING IN THE MORNING TO STEADY YOUR NERVES TO GET RID OF A HANGOVER: NO
SUBSTANCE_ABUSE_PAST_12_MONTHS: YES

## 2024-08-30 ASSESSMENT — PAIN SCALES - GENERAL
PAINLEVEL_OUTOF10: 0 - NO PAIN

## 2024-08-30 NOTE — ED TRIAGE NOTES
Pt arrived to ED reporting SI and HI with a plan however he is unable to verbalize the plan at this time. Pt seems to be experiencing flight of ideas. He is excited and speaking of multiple topics at a time. He also endorsing AH and VH/ He states that he is unable to tell what they are saying he describes it as the sound of being in care home and hearing people from the vents. He reports a past history of a suicidal attempt 8 years ago by cutting his wrist

## 2024-08-30 NOTE — PROGRESS NOTES
"EPAT - Social Work Psychiatric Assessment    Arrival Details  Mode of Arrival: Ambulatory  Admission Source: Other (Comment) (community patient is homeless)  Admission Type: Involuntary  EPAT Assessment Start Date: 08/30/24  EPAT Assessment Start Time: 0930  Name of : Sussy Presley MSMAX LSW    History of Present Illness    Admission Reason: Evaluation    HPI:   Patient is a 39 year old male walked into ED endorsing SI,HI, \"I want to kill anyone or myself but I don't want to kill anyone or myself\". Patient has a history of antisocial personality disorder, LEVON, unspecified psychosis. Provider note and chart history reviewed. Patient was lying in bed asleep on approach. Patient was able to wake up and participate in assessment. Patient was soft spoken, flat affect, polite. Patient endorsed current SI, no plan, denies HI, denies AH, endorsing VH, \"I see people in the adin, like shadows\". Patient identified to feeling helpless and hopeless and explained, I need help with my drug problem I can't stop using drugs\". Patient reports to using an unknown amount of cocaine, crystal methamphetamines, and cannabis. Patient has been using illicit drugs daily for over 3 years, multiple treatment attempts with little success at daily abstinence. Patient identified stressors for current SI,chronic homelessness, lack of sober support, lack of family support. Patient's mother passed away from heart complications x 1 month ago. Patient has had multiple ED and inpatient psychiatric admits, most recent on 8/8/2024 in ED for unspecified psychosis, appeared to be drug related Patient was admitted at that time to [facility]. Patient reported his discharge instructions were to go to the [facility] [place], \"they didn't have any treatment there, it's just a homeless shelter\". Patient was also admitted to [facility] on 7/18/2024, and [facility] on 7/3/2024. Patient had one initial session at [facility] for MH support on 7/30/2024 and never " returned for follow up. Patient admits to being non compliant with daily medication due to daily drug use and homelessness. Attempted to contact his grandmother which is his emergency contact, Su Stover [phone], unsuccessful. Patient was oriented x 4, polite, no signs of aggression or hostile behavior.    SW Readmission Information   Readmission within 30 Days: Yes  Previous ED Visit Date and Reason : [facility]  Previous Discharge Date and Location: 8/11/2024  Factors Contributing to  Readmission Inpatient/ED (Team Perspective): Med Compliance/Difficulty Obtaining, Substance Abuse, Failed to Keep Follow-Up Appointments  Readmission Factors Team Comments: daily substance use  Factors Contributing to Readmission (Patient/Family Perspective): daily drug use, homelessness  Readmission From: Other (Comment) (community patient is homeless)    Psychiatric Symptoms  Anxiety Symptoms: Generalized  Depression Symptoms: Feelings of helplessness, Feelings of hopelessess, Feelings of worthlessness  Gely Symptoms: No problems reported or observed.    Psychosis Symptoms  Hallucination Type: Visual  Delusion Type: No problems reported or observed.    Additional Symptoms - Adult  Generalized Anxiety Disorder: Excessive anxiety/worry, Irritability, Restlessness  Obsessive Compulsive Disorder: No problems reported or observed.  Panic Attack: No problems reported or observed.  Post Traumatic Stress Disorder: No problems reported or observed.  Delirium: No problems reported or observed.    Past Psychiatric History/Meds/Treatments  Past Psychiatric History: antisocial personality disorder, LEVON,unspecified psychosis  Past Psychiatric Meds/Treatments: abilify 10 mg x1, prozac 10 mg x 3 daily, hydroxzine 50 mg x 6 hours, zyprexa 5mg x 1 at bedtime, zoloft 50mg x 1  Past Violence/Victimization History: denies    Current Mental Health Contacts  [facility] [phone]  Provider Last  "Appointment Date: 7/30/2024    Support System: Friends    Living Arrangement: Homeless    Home Safety  Feels Safe Living in Home: No  Home Safety : patient is homeless    Income Information  Employment Status for: Patient  Employment Status: Disabled  Income Source: Disability    Miltary Service/Education History  Current or Previous  Service: None  Education Level: High school  History of Learning Problems: No  History of School Behavior Problems: No  School History: patient graduated HS some college    Social/Cultural History    Social History:   Patient was raised in the [place] area by maternal grandmother, has 3 sisters, 2 brothers, not close with siblings. Patient previously lives with grandmother. Patient reports to not being able to live with grandmother for the past 6 months, due to \"I was high and I threatened her but I didn't mean it\". Mother passed away x 1 month ago, father is in senior care, patient unsure why. Patient graduated HS and has some college credits. Patient has no work history on disability for MH diagnosis. Patient was able to identify 2 sober friends, unable to identify any family support. Patient is chronically homeless.Patient has no children. Patient enjoys writing, listening to music and sports.  Important Activities: Hobbies    Legal  Legal Considerations: Patient/ Family Ability to Make Healthcare Decisions  Legal Concerns: none    Drug Screening  Have you used any substances (canabis, cocaine, heroin, hallucinogens, inhalants, etc.) in the past 12 months?: Yes  Have you used any prescription drugs other than prescribed in the past 12 months?: No  Is a toxicology screen needed?: Yes    Stage of Change  Stage of Change: Precontemplation    Psychosocial  Psychosocial (WDL): Within Defined Limits  Behaviors/Mood: Cooperative, Flat affect    Orientation  Orientation Level: Oriented X4    General Appearance  Motor Activity: Unremarkable  Speech Pattern: Other (Comment) " (unremarkable)  General Attitude: Cooperative  Appearance/Hygiene: Unremarkable    Thought Process  Coherency: Other (Comment) (unremarkable)  Content: Unremarkable  Delusions: Other (Comment) (none noted or observed)  Perception: Not altered  Hallucination: Visual  Judgment/Insight: Impaired  Confusion: None  Cognition: Impulsive    Sleep Pattern  Sleep Pattern: Difficulty falling asleep, Disturbed/interrupted sleep    Risk Factors  Self Harm/Suicidal Ideation Plan: SI no plan  Previous Self Harm/Suicidal Plans: yes in 2016, cut wrist  Risk Factors: Lower socioeconomic status, Major mental illness, Male, Substance abuse    Violence Risk Assessment  Assessment of Violence: On admission  Thoughts of Harm to Others: No    Ability to Assess Risk Screen  Risk Screen - Ability to Assess: Able to be screened  Ask Suicide-Screening Questions  1. In the past few weeks, have you wished you were dead?: Yes  2. In the past few weeks, have you felt that you or your family would be better off if you were dead?: Yes  3. In the past week, have you been having thoughts about killing yourself?: Yes  4. Have you ever tried to kill yourself?: Yes  How did you try to kill yourself?: cut wrists  When did you try to kill yourself?: 2016  5. Are you having thoughts of killing yourself right now?: Yes  Calculated Risk Score: Imminent Risk  [place] Suicide Severity Rating Scale (Screener/Recent Self-Report)  1. Wish to be Dead (Past 1 Month): Yes  2. Non-Specific Active Suicidal Thoughts (Past 1 Month): Yes  3. Active Suicidal Ideation with any Methods (Not Plan) Without Intent to Act (Past 1 Month): Yes  4. Active Suicidal Ideation with Some Intent to Act, Without Specific Plan (Past 1 Month): Yes  5. Active Suicidal Ideation with Specific Plan and Intent (Past 1 Month): No  6. Suicidal Behavior (Lifetime): Yes  Calculated C-SSRS Risk Score (Lifetime/Recent): High Risk  Step 1: Risk Factors  Current & Past Psychiatric Dx: Psychotic  "disorder, Alcohol/substance abuse disorders, Cluster B personality disorders or traits (i.e., borderline, antisocial, histrionic & narcissistic)  Presenting Symptoms: Anhedonia, Impulsivity, Hopelessness or despair, Anxiety and/or panic, Psychosis  Family History:  (denies)  Precipitants/Stressors: Social isolation, Inadequate social supports, Substance intoxication or withdrawal, Pending incarceration or homelessness  Access to Lethal Methods : No  Step 2: Protective Factors   Protective Factors Internal: Fear of death or the actual act of killing self  Protective Factors External: Supportive social network or family or friends  Step 3: Suicidal Ideation Intensity  How Many Times Have You Had These Thoughts: Less than once a week  When You Have the Thoughts How Long do They Last : Less than 1 hour/some of the time  Could/Can You Stop Thinking About Killing Yourself or Wanting to Die if You Want to: Can control thoughts with some difficulty  Are There Things - Anyone or Anything - That Stopped You From Wanting to Die or Acting on: Deterrents probably stopped you  What Sort of Reasons Did You Have For Thinking About Wanting to Die or Killing Yourself: Equally to get attention, revenge or a reaction from others and to end/stop the pain  Total Score: 11  Step 5: Documentation  Risk Level: High suicide risk    Psychiatric Impression and Plan of Care    Assessment and Plan:   Patient is a 39 year old male presenting self to ED for SI,HI, \"I want to kill everyone and I want to kill myself. I don't want to kill anyone or myself\". At time of ED admission patient presented as tangential thought process, rapid speech, intermittent swearing. During assessment patient was polite and cooperative, endorsed current SI, no plans. C-SSRS reflect high rate of self harm. Patient identified to feeling helpless and hopeless. Current stressors are homelessness,  recent death of mother x 30 days ago and daily illicit drug use. Patient " "self reports to daily use of cocaine, cannabis, and crystal methamphetamine use Patient has been struggling with daily drug use for over 3 years. Chart history reflect multiple ED admissions with similar presentation unspecified psychosis drug related. Patient has also had multiple inpatient psychiatric admits most recent ED admit at [facility] on 8/1/2024 at which time he was transferred to [facility]. Patient denies current HI,AH, admits to ,\" I see adin people and shadow people all the time\". Due to active , unspecified psychosis at time of ED admission, patient recommended for HLOC, Dr Yamilka MARIN concurs. UDS is pending    Unspecified psychosis  Antisocial personality disorder  LEVON    Outcome/Disposition  Assessment, Recommendations and Risk Level Reviewed with: Dr Yamilka MARIN  EPAT Assessment Completed Date: 08/30/24  EPAT Assessment Completed Time: 1058      "

## 2024-08-30 NOTE — ED PROVIDER NOTES
"CC: Suicidal, Homicidal, and Psychiatric Evaluation     History provided by: Patient  Limitations to History: Intoxication and Mental Illness    HPI:  Patient is a 39-year-old male with history of bipolar disorder, schizoaffective disorder, polysubstance use disorder who presents with psychosis, SI, HI.  He states he wants to go to rehab for everything.  He states he had cocaine all day today as well.  He also states his medications for psychiatric disease is not working and he has not been taking it.  During my evaluation, he has a tangential thought process and rapid speech, intermittently swearing and stating \" I want to kill everyone and I want to kill myself but I do not want to kill anyone or myself.\"  He denies any falls or trauma.  He has had multiple ED visits for similar complaint.    External Records Reviewed:  I reviewed prior ED visits, Care Everywhere, discharge summaries and outpatient records as appropriate.   ???????????????????????????????????????????????????????????????  Triage Vitals:  T 36 °C (96.8 °F)  HR (!) 128  BP (!) 154/97  RR 18  O2 95 % None (Room air)    Physical Exam  Constitutional:       Appearance: Normal appearance.   HENT:      Head: Normocephalic and atraumatic.      Nose: Nose normal.      Mouth/Throat:      Mouth: Mucous membranes are moist.   Eyes:      Conjunctiva/sclera: Conjunctivae normal.   Cardiovascular:      Rate and Rhythm: Regular rhythm. Tachycardia present.   Pulmonary:      Effort: Pulmonary effort is normal.   Abdominal:      General: Abdomen is flat.      Palpations: Abdomen is soft.   Musculoskeletal:         General: Normal range of motion.      Cervical back: Normal range of motion.      Comments: Ambulatory with steady gait   Neurological:      General: No focal deficit present.      Mental Status: He is alert.   Psychiatric:      Comments: Internally stimulated, not redirectable, endorses active auditory and visual hallucinations, passive SI, " generalized homicidal ideation, tangential thought process, rapid speech        ???????????????????????????????????????????????????????????????  ED Course/Treatment/Medical Decision Making  MDM:  Patient is a 39-year-old male who presents for psychiatric evaluation.  Vital signs notable for tachycardia likely in the setting of recent substance use, no signs of trauma on evaluation.  He is ambulatory with steady gait in ED, tolerating water.  Given psychiatric history and presentation today I did obtain EPAT labs and consultation.  Concern for acute psychiatric decompensation versus substance use.      ED Course:  ED Course as of 08/30/24 0705   Fri Aug 30, 2024   0414 Patient voluntarily requesting B52 [SA]   0624 Labs overall wnl, slightly elevated AST, otherwise wnl [SA]   0704 Patient is medically clear [SA]      ED Course User Index  [SA] Kassie Alegria, DO         Diagnoses as of 08/30/24 0705   Psychiatric complaint         EKG Interpretation:  See ED Course/Below:    Independent Interpretation of Studies:  I independently interpreted labs/imaging as stated in ED Course or below.    Differential diagnoses considered include but are not limited to: See MDM/Below:    Social Determinants Limiting Care:  Difficulty obtaining outpatient follow-up, Difficulty paying for/obtaining medications, Substance use disorder, Financial difficulties, and Housing insecurity The following actions were taken to address these social determinants:      Discussion of Management with Other Providers: See MDM/Below:    Disposition:  Patient was signed out at 0700 pending completion of their work-up.  Please see the next provider's transition of care note for the remainder of the patient's care.       TEE Maza, PGY-3    I reviewed the case with the attending ED physician. The attending ED physician agrees with the plan. Patient and/or patient´s representative was counseled regarding labs, imaging, likely diagnosis, and  plan. All questions were answered.    Disclaimer: This note was dictated by speech recognition.  Attempt at proofreading was made to minimize errors.  Errors in transcription may be present.  Please call if questions.    Procedures ? SmartLinks last updated 8/30/2024 7:05 AM        Kassie Alegria, DO  Resident  08/30/24 0653       Kassie Alegria, DO  Resident  08/30/24 0705

## 2024-08-31 VITALS
HEART RATE: 73 BPM | SYSTOLIC BLOOD PRESSURE: 108 MMHG | BODY MASS INDEX: 31.5 KG/M2 | HEIGHT: 70 IN | RESPIRATION RATE: 16 BRPM | OXYGEN SATURATION: 95 % | WEIGHT: 220 LBS | TEMPERATURE: 97.1 F | DIASTOLIC BLOOD PRESSURE: 73 MMHG

## 2024-08-31 LAB
ATRIAL RATE: 125 BPM
P AXIS: 40 DEGREES
P OFFSET: 213 MS
P ONSET: 160 MS
PR INTERVAL: 120 MS
Q ONSET: 220 MS
QRS COUNT: 21 BEATS
QRS DURATION: 72 MS
QT INTERVAL: 336 MS
QTC CALCULATION(BAZETT): 484 MS
QTC FREDERICIA: 428 MS
R AXIS: 62 DEGREES
T AXIS: 60 DEGREES
T OFFSET: 388 MS
VENTRICULAR RATE: 125 BPM

## 2024-08-31 PROCEDURE — G0378 HOSPITAL OBSERVATION PER HR: HCPCS

## 2024-08-31 NOTE — PROGRESS NOTES
"Observation History and Physical  [facility] EMERGENCY MEDICINE           History of Present Illness     History provided by: Patient  Limitations to History: Uncooperative  External Records Reviewed: EPAT social work psychiatry assessment.      Patient History:  Nathan Vaughn is a 39 y.o. male who presented to the emergency department with passive suicidal ideation and auditory and visual hallucinations.  The patient has a history of Bipolar disorder, schizoaffective disorder, polysubstance abuse disorder.  The patient continues to endorse passive SI as well as both auditory and visual hallucinations at the time of reevaluation.    Nathan Vaughn was escalated to observation status. Observation was necessary as they continue to require treatment and monitoring of their psychiatric illness while awaiting inpatient behavioral health bed availability.    Physical Exam     Visit Vitals  /63   Pulse 85   Temp 36 °C (96.8 °F) (Temporal)   Resp 14   Ht 1.778 m (5' 10\")   Wt 99.8 kg (220 lb)   SpO2 96%   BMI 31.57 kg/m²   Smoking Status Every Day   BSA 2.22 m²       GENERAL:  The patient appears nourished and normally developed. Vital signs as documented.     PULMONARY:  Without any respiratory distress. Able to speak full sentences, no accessory muscle use    CARDIAC: Warm and well perfused. No cyanosis.    MUSCULOSKELETAL:   Able to ambulate, Non edematous, with no obvious deformities.     SKIN: No pallor. Intact.    NEURO:  No obvious neurological deficits.  Able to follow commands.    Psych: Tangential thoughts with occasional escalations in voice but no homicidal thoughts.  Does continue to endorse passive SI.      Impression and Plan     ED Course as of 08/30/24 2319   Fri Aug 30, 2024   1634 Patient voluntarily requesting B52 [SA]   0624 Labs overall wnl, slightly elevated AST, otherwise wnl [SA]   0704 Patient is medically clear [SA]      ED Course User Index  [SA] Kassie DO Airam    "      Diagnoses as of 08/30/24 2319   Psychiatric complaint        Nathan Vaughn under observation status in [facility] EMERGENCY MEDICINE for psychiatric illness monitoring and treatment while awaiting inpatient behavioral health bed availability.   Emergency Psychiatric Assessment Team has been consulted. Case discussed with them and decision for inpatient hospitalization deemed necessary. Patient is medically clear at this time.     Home medication reconciliation was reviewed and restarted where clinically indicated.     Patient and Family updated on plan of care.     Rudy Hawkins, DO

## 2024-08-31 NOTE — ED NOTES
This RN attempted to call report to [facility]. Adult unit (1500 unit) stated they will not take report since he is not due there until 10am or after.      Joyce Garcia RN  08/31/24 [id number]

## 2024-08-31 NOTE — DISCHARGE SUMMARY
"Observation Disposition Note  [facility] EMERGENCY MEDICINE             Subjective:       Nathan Vaughn has been under observation status in [facility] EMERGENCY MEDICINE for psychiatric illness monitoring and treatment while awaiting inpatient behavioral health bed availability.       Mr. Vaughn has been accepted to [facility] for inpatient behavioral health bed.  Transport set up.  Pink slip has been entered.  Transfer note written.    Physical Exam     Visit Vitals  /73 (BP Location: Right arm, Patient Position: Lying)   Pulse 73   Temp 36.2 °C (97.1 °F) (Temporal)   Resp 16   Ht 1.778 m (5' 10\")   Wt 99.8 kg (220 lb)   SpO2 95%   BMI 31.57 kg/m²   Smoking Status Every Day   BSA 2.22 m²       GENERAL:  The patient appears nourished and normally developed. Vital signs as documented.     PULMONARY:  Without any respiratory distress. Able to speak full sentences, no accessory muscle use    CARDIAC: Warm and well perfused. No cyanosis.    MUSCULOSKELETAL:   Able to ambulate, Non edematous, with no obvious deformities.     SKIN: No pallor. Intact.    NEURO:  No obvious neurological deficits.  Able to follow commands.    Psych: cooperative, resting      Impresssion and Plan     ED Course as of 08/31/24 0914   Fri Aug 30, 2024   0414 Patient voluntarily requesting B52 [SA]   0624 Labs overall wnl, slightly elevated AST, otherwise wnl [SA]   0704 Patient is medically clear [SA]      ED Course User Index  [SA] Kassie Airam, DO         Diagnoses as of 08/31/24 0914   Psychiatric complaint       In summary, Nathan Vaughn has been cared under observation in [facility] for psychiatric illness monitoring and treatment while awaiting inpatient behavioral health bed availability.     Emergency Psychiatric Assessment Team was consulted. Case discussed with them and decision for inpatient hospitalization deemed necessary.     Patient has been " accepted at [facility]    Total length of observation was 30  hours. Dr. Shellie Gates  is the disposition attending.    Discharge Diagnosis  Suicidal ideation    Shellie Gates MD

## 2024-08-31 NOTE — SIGNIFICANT EVENT
Application for Emergency Admission      Ready for Transfer?  Is the patient medically cleared for transfer to inpatient psychiatry: Yes  Has the patient been accepted to an inpatient psychiatric hospital: Yes    Application for Emergency Admission  IN ACCORDANCE WITH SECTION 5122.10 [place]  The Chief Clinical Officer of: Elizabeth Rasmussen 8/31/2024 .1:43 AM    Reason for Hospitalization  The undersigned has reason to believe that: Nathan Vaughn Is a mentally ill person subject to hospitalization by court order under division B Section 5122.01 of the Revised Code, i.e., this person:    1.Yes  Represents a substantial risk of physical harm to self as manifested by evidence of threats of, or attempts at, suicide or serious self-inflicted bodily harm    2.No Represents a substantial risk of physical harm to others as manifested by evidence of recent homicidal or other violent behavior, evidence of recent threats that place another in reasonable fear of violent behavior and serious physical harm, or other evidence of present dangerousness    3.Yes Represents a substantial and immediate risk of serious physical impairment or injury to self as manifested by  evidence that the person is unable to provide for and is not providing for the person's basic physical needs because of the person's mental illness and that appropriate provision for those needs cannot be made  immediately available in the community    4.Yes Would benefit from treatment in a hospital for his mental illness and is in need of such treatment as manifested by evidence of behavior that creates a grave and imminent risk to substantial rights of others or  himself.    5.Yes Would benefit from treatment as manifested by evidence of behavior that indicates all of the following:       (a) The person is unlikely to survive safely in the community without supervision, based on a clinical determination.       (b) The person has a history of lack of compliance  with treatment for mental illness and one of the following applies:      (i) At least twice within the thirty-six months prior to the filing of an affidavit seeking court-ordered treatment of the person under section 5122.111 of the Revised Code, the lack of compliance has been a significant factor in necessitating hospitalization in a hospital or receipt of services in a forensic or other mental health unit of a correctional facility, provided that the thirty-six-month period shall be extended by the length of any hospitalization or incarceration of the person that occurred within the thirty-six-month period.      (ii) Within the forty-eight months prior to the filing of an affidavit seeking court-ordered treatment of the person under section 5122.111 of the Revised Code, the lack of compliance resulted in one or more acts of serious violent behavior toward self or others or threats of, or attempts at, serious physical harm to self or others, provided that the forty-eight-month period shall be extended by the length of any hospitalization or incarceration of the person that occurred within the forty-eight-month period.      (c) The person, as a result of mental illness, is unlikely to voluntarily participate in necessary treatment.       (d) In view of the person's treatment history and current behavior, the person is in need of treatment in order to prevent a relapse or deterioration that would be likely to result in substantial risk of serious harm to the person or others.    (e) Represents a substantial risk of physical harm to self or others if allowed to remain at liberty pending examination.    Therefore, it is requested that said person be admitted to the above named facility.    STATEMENT OF BELIEF    Must be filled out by one of the following: a psychiatrist, licensed physician, licensed clinical psychologist, health or ,  or .  (Statement shall include the circumstances  under which the individual was taken into custody and the reason for the person's belief that hospitalization is necessary. The statement shall also include a reference to efforts made to secure the individual's property at his residence if he was taken into custody there. Every reasonable and appropriate effort should be made to take this person into custody in the least conspicuous manner possible.)    Patient has ongoing suicidal ideation and I think he would benefit from an inpatient psychiatric admission today for treatment.    Jason Kirkpatrick MD 8/31/2024     _____________________________________________________________   Place of Employment: [facility]    STATEMENT OF OBSERVATION BY PSYCHIATRIST, LICENSED PHYSICIAN, OR LICENSED CLINICAL PSYCHOLOGIST, IF APPLICABLE    Place of Observation (e.g., [facility] mental [facility] center, general hospital, office, emergency facility)  (If applicable, please complete)    Jason Kirkpatrick MD 8/31/2024    _____________________________________________________________

## 2024-09-12 PROCEDURE — 99285 EMERGENCY DEPT VISIT HI MDM: CPT

## 2024-09-12 PROCEDURE — 93010 ELECTROCARDIOGRAM REPORT: CPT | Performed by: STUDENT IN AN ORGANIZED HEALTH CARE EDUCATION/TRAINING PROGRAM

## 2024-09-12 PROCEDURE — 99285 EMERGENCY DEPT VISIT HI MDM: CPT | Performed by: STUDENT IN AN ORGANIZED HEALTH CARE EDUCATION/TRAINING PROGRAM

## 2024-09-12 ASSESSMENT — PAIN SCALES - GENERAL: PAINLEVEL_OUTOF10: 10 - WORST POSSIBLE PAIN

## 2024-09-12 ASSESSMENT — COLUMBIA-SUICIDE SEVERITY RATING SCALE - C-SSRS
5. HAVE YOU STARTED TO WORK OUT OR WORKED OUT THE DETAILS OF HOW TO KILL YOURSELF? DO YOU INTEND TO CARRY OUT THIS PLAN?: YES
2. HAVE YOU ACTUALLY HAD ANY THOUGHTS OF KILLING YOURSELF?: YES
6. HAVE YOU EVER DONE ANYTHING, STARTED TO DO ANYTHING, OR PREPARED TO DO ANYTHING TO END YOUR LIFE?: YES
4. HAVE YOU HAD THESE THOUGHTS AND HAD SOME INTENTION OF ACTING ON THEM?: YES
6. HAVE YOU EVER DONE ANYTHING, STARTED TO DO ANYTHING, OR PREPARED TO DO ANYTHING TO END YOUR LIFE?: YES
1. IN THE PAST MONTH, HAVE YOU WISHED YOU WERE DEAD OR WISHED YOU COULD GO TO SLEEP AND NOT WAKE UP?: YES

## 2024-09-12 ASSESSMENT — PAIN DESCRIPTION - DESCRIPTORS: DESCRIPTORS: ACHING

## 2024-09-12 ASSESSMENT — PAIN - FUNCTIONAL ASSESSMENT: PAIN_FUNCTIONAL_ASSESSMENT: 0-10

## 2024-09-12 ASSESSMENT — PAIN DESCRIPTION - LOCATION: LOCATION: GENERALIZED

## 2024-09-12 ASSESSMENT — PAIN DESCRIPTION - PAIN TYPE: TYPE: ACUTE PAIN

## 2024-09-13 ENCOUNTER — CLINICAL SUPPORT (OUTPATIENT)
Dept: EMERGENCY MEDICINE | Facility: HOSPITAL | Age: 39
End: 2024-09-13
Payer: MEDICARE

## 2024-09-13 ENCOUNTER — HOSPITAL ENCOUNTER (EMERGENCY)
Facility: HOSPITAL | Age: 39
Discharge: OTHER INSTITUTION | End: 2024-09-13
Attending: STUDENT IN AN ORGANIZED HEALTH CARE EDUCATION/TRAINING PROGRAM
Payer: MEDICARE

## 2024-09-13 VITALS
HEART RATE: 96 BPM | OXYGEN SATURATION: 96 % | BODY MASS INDEX: 31.5 KG/M2 | DIASTOLIC BLOOD PRESSURE: 70 MMHG | TEMPERATURE: 97.5 F | SYSTOLIC BLOOD PRESSURE: 107 MMHG | RESPIRATION RATE: 16 BRPM | HEIGHT: 70 IN | WEIGHT: 220 LBS

## 2024-09-13 DIAGNOSIS — R45.851 SUICIDAL IDEATION: Primary | ICD-10-CM

## 2024-09-13 LAB
ALBUMIN SERPL BCP-MCNC: 4.8 G/DL (ref 3.4–5)
ALP SERPL-CCNC: 81 U/L (ref 33–120)
ALT SERPL W P-5'-P-CCNC: 24 U/L (ref 10–52)
AMPHETAMINES UR QL SCN: ABNORMAL
ANION GAP SERPL CALC-SCNC: 20 MMOL/L (ref 10–20)
APAP SERPL-MCNC: <10 UG/ML
AST SERPL W P-5'-P-CCNC: 33 U/L (ref 9–39)
ATRIAL RATE: 122 BPM
BARBITURATES UR QL SCN: ABNORMAL
BASOPHILS # BLD AUTO: 0.03 X10*3/UL (ref 0–0.1)
BASOPHILS NFR BLD AUTO: 0.4 %
BENZODIAZ UR QL SCN: ABNORMAL
BILIRUB SERPL-MCNC: 1.2 MG/DL (ref 0–1.2)
BUN SERPL-MCNC: 16 MG/DL (ref 6–23)
BZE UR QL SCN: ABNORMAL
CALCIUM SERPL-MCNC: 9.6 MG/DL (ref 8.6–10.6)
CANNABINOIDS UR QL SCN: ABNORMAL
CHLORIDE SERPL-SCNC: 101 MMOL/L (ref 98–107)
CK SERPL-CCNC: 1264 U/L (ref 0–325)
CO2 SERPL-SCNC: 24 MMOL/L (ref 21–32)
CREAT SERPL-MCNC: 1.11 MG/DL (ref 0.5–1.3)
EGFRCR SERPLBLD CKD-EPI 2021: 88 ML/MIN/1.73M*2
EOSINOPHIL # BLD AUTO: 0.07 X10*3/UL (ref 0–0.7)
EOSINOPHIL NFR BLD AUTO: 0.9 %
ERYTHROCYTE [DISTWIDTH] IN BLOOD BY AUTOMATED COUNT: 14.6 % (ref 11.5–14.5)
ETHANOL SERPL-MCNC: 18 MG/DL
FENTANYL+NORFENTANYL UR QL SCN: ABNORMAL
GLUCOSE SERPL-MCNC: 99 MG/DL (ref 74–99)
HCT VFR BLD AUTO: 37.6 % (ref 41–52)
HGB BLD-MCNC: 12.5 G/DL (ref 13.5–17.5)
IMM GRANULOCYTES # BLD AUTO: 0.03 X10*3/UL (ref 0–0.7)
IMM GRANULOCYTES NFR BLD AUTO: 0.4 % (ref 0–0.9)
LYMPHOCYTES # BLD AUTO: 1.93 X10*3/UL (ref 1.2–4.8)
LYMPHOCYTES NFR BLD AUTO: 24.3 %
MCH RBC QN AUTO: 26.8 PG (ref 26–34)
MCHC RBC AUTO-ENTMCNC: 33.2 G/DL (ref 32–36)
MCV RBC AUTO: 81 FL (ref 80–100)
METHADONE UR QL SCN: ABNORMAL
MONOCYTES # BLD AUTO: 0.77 X10*3/UL (ref 0.1–1)
MONOCYTES NFR BLD AUTO: 9.7 %
NEUTROPHILS # BLD AUTO: 5.1 X10*3/UL (ref 1.2–7.7)
NEUTROPHILS NFR BLD AUTO: 64.3 %
NRBC BLD-RTO: 0 /100 WBCS (ref 0–0)
OPIATES UR QL SCN: ABNORMAL
OXYCODONE+OXYMORPHONE UR QL SCN: ABNORMAL
P AXIS: 48 DEGREES
P OFFSET: 213 MS
P ONSET: 159 MS
PCP UR QL SCN: ABNORMAL
PLATELET # BLD AUTO: 384 X10*3/UL (ref 150–450)
POTASSIUM SERPL-SCNC: 3.5 MMOL/L (ref 3.5–5.3)
PR INTERVAL: 120 MS
PROT SERPL-MCNC: 8.5 G/DL (ref 6.4–8.2)
Q ONSET: 219 MS
QRS COUNT: 20 BEATS
QRS DURATION: 76 MS
QT INTERVAL: 338 MS
QTC CALCULATION(BAZETT): 481 MS
QTC FREDERICIA: 428 MS
R AXIS: 66 DEGREES
RBC # BLD AUTO: 4.66 X10*6/UL (ref 4.5–5.9)
SALICYLATES SERPL-MCNC: <3 MG/DL
SODIUM SERPL-SCNC: 141 MMOL/L (ref 136–145)
T AXIS: 19 DEGREES
T OFFSET: 388 MS
VENTRICULAR RATE: 122 BPM
WBC # BLD AUTO: 7.9 X10*3/UL (ref 4.4–11.3)

## 2024-09-13 PROCEDURE — 96360 HYDRATION IV INFUSION INIT: CPT

## 2024-09-13 PROCEDURE — 96372 THER/PROPH/DIAG INJ SC/IM: CPT | Performed by: STUDENT IN AN ORGANIZED HEALTH CARE EDUCATION/TRAINING PROGRAM

## 2024-09-13 PROCEDURE — 36415 COLL VENOUS BLD VENIPUNCTURE: CPT

## 2024-09-13 PROCEDURE — 2500000001 HC RX 250 WO HCPCS SELF ADMINISTERED DRUGS (ALT 637 FOR MEDICARE OP): Performed by: STUDENT IN AN ORGANIZED HEALTH CARE EDUCATION/TRAINING PROGRAM

## 2024-09-13 PROCEDURE — 96361 HYDRATE IV INFUSION ADD-ON: CPT

## 2024-09-13 PROCEDURE — 2500000004 HC RX 250 GENERAL PHARMACY W/ HCPCS (ALT 636 FOR OP/ED)

## 2024-09-13 PROCEDURE — 84075 ASSAY ALKALINE PHOSPHATASE: CPT

## 2024-09-13 PROCEDURE — 2500000004 HC RX 250 GENERAL PHARMACY W/ HCPCS (ALT 636 FOR OP/ED): Performed by: STUDENT IN AN ORGANIZED HEALTH CARE EDUCATION/TRAINING PROGRAM

## 2024-09-13 PROCEDURE — 80307 DRUG TEST PRSMV CHEM ANLYZR: CPT

## 2024-09-13 PROCEDURE — 82550 ASSAY OF CK (CPK): CPT | Performed by: STUDENT IN AN ORGANIZED HEALTH CARE EDUCATION/TRAINING PROGRAM

## 2024-09-13 PROCEDURE — 2500000001 HC RX 250 WO HCPCS SELF ADMINISTERED DRUGS (ALT 637 FOR MEDICARE OP)

## 2024-09-13 PROCEDURE — 2500000002 HC RX 250 W HCPCS SELF ADMINISTERED DRUGS (ALT 637 FOR MEDICARE OP, ALT 636 FOR OP/ED)

## 2024-09-13 PROCEDURE — 93005 ELECTROCARDIOGRAM TRACING: CPT

## 2024-09-13 PROCEDURE — 80143 DRUG ASSAY ACETAMINOPHEN: CPT

## 2024-09-13 PROCEDURE — 85025 COMPLETE CBC W/AUTO DIFF WBC: CPT

## 2024-09-13 RX ORDER — OLANZAPINE 5 MG/1
5 TABLET ORAL ONCE
Status: COMPLETED | OUTPATIENT
Start: 2024-09-13 | End: 2024-09-13

## 2024-09-13 RX ORDER — FLUOXETINE 10 MG/1
30 CAPSULE ORAL ONCE
Status: DISCONTINUED | OUTPATIENT
Start: 2024-09-13 | End: 2024-09-13 | Stop reason: HOSPADM

## 2024-09-13 RX ORDER — ARIPIPRAZOLE 5 MG/1
10 TABLET ORAL ONCE
Status: COMPLETED | OUTPATIENT
Start: 2024-09-13 | End: 2024-09-13

## 2024-09-13 RX ORDER — HALOPERIDOL 5 MG/ML
5 INJECTION INTRAMUSCULAR ONCE
Status: COMPLETED | OUTPATIENT
Start: 2024-09-13 | End: 2024-09-13

## 2024-09-13 RX ORDER — SERTRALINE HYDROCHLORIDE 50 MG/1
50 TABLET, FILM COATED ORAL ONCE
Status: COMPLETED | OUTPATIENT
Start: 2024-09-13 | End: 2024-09-13

## 2024-09-13 RX ORDER — LORAZEPAM 0.5 MG/1
2 TABLET ORAL ONCE
Status: COMPLETED | OUTPATIENT
Start: 2024-09-13 | End: 2024-09-13

## 2024-09-13 SDOH — HEALTH STABILITY: MENTAL HEALTH: DEPRESSION SYMPTOMS: FEELINGS OF HELPLESSNESS;FEELINGS OF HOPELESSESS;FEELINGS OF WORTHLESSNESS;LOSS OF INTEREST;CRYING

## 2024-09-13 SDOH — HEALTH STABILITY: MENTAL HEALTH: HAVE YOU EVER DONE ANYTHING, STARTED TO DO ANYTHING, OR PREPARED TO DO ANYTHING TO END YOUR LIFE?: YES

## 2024-09-13 SDOH — HEALTH STABILITY: MENTAL HEALTH: HAVE YOU WISHED YOU WERE DEAD OR WISHED YOU COULD GO TO SLEEP AND NOT WAKE UP?: YES

## 2024-09-13 SDOH — HEALTH STABILITY: MENTAL HEALTH: SUICIDAL BEHAVIOR (LIFETIME): YES

## 2024-09-13 SDOH — HEALTH STABILITY: MENTAL HEALTH: ANXIETY SYMPTOMS: GENERALIZED;FEELINGS OF DOOM

## 2024-09-13 SDOH — HEALTH STABILITY: MENTAL HEALTH
OTHER SUICIDE PRECAUTIONS INCLUDE: PATIENT PLACED IN AN EASILY OBSERVABLE ROOM WITH DOOR/CURTAIN REMAINING OPEN;PATIENT PLACED IN GOWN (SNAPS OR PAPER GOWNS PREFERRED) AND WANDED;REMAINING RISKS IDENTIFIED AND MITIGATED;PROVIDER NOTIFIED;PATIENT PLACED IN PSYCH SAFE ROOM (IF AVAILABLE);EL

## 2024-09-13 SDOH — HEALTH STABILITY: MENTAL HEALTH: SUICIDE ASSESSMENT: ADULT (C-SSRS)

## 2024-09-13 SDOH — HEALTH STABILITY: MENTAL HEALTH: ACTIVE SUICIDAL IDEATION WITH SPECIFIC PLAN AND INTENT (PAST 1 MONTH): YES

## 2024-09-13 SDOH — HEALTH STABILITY: MENTAL HEALTH: WISH TO BE DEAD (PAST 1 MONTH): YES

## 2024-09-13 SDOH — HEALTH STABILITY: MENTAL HEALTH: NON-SPECIFIC ACTIVE SUICIDAL THOUGHTS (PAST 1 MONTH): YES

## 2024-09-13 SDOH — HEALTH STABILITY: MENTAL HEALTH: HAVE YOU ACTUALLY HAD ANY THOUGHTS OF KILLING YOURSELF?: YES

## 2024-09-13 SDOH — HEALTH STABILITY: MENTAL HEALTH: HAVE YOU HAD THESE THOUGHTS AND HAD SOME INTENTION OF ACTING ON THEM?: NO

## 2024-09-13 SDOH — HEALTH STABILITY: MENTAL HEALTH: SUICIDAL BEHAVIOR (DESCRIPTION): PT STATES PREVIOUS SUICIDE ATTEMPT VIA CUTTING

## 2024-09-13 SDOH — HEALTH STABILITY: MENTAL HEALTH: CONTENT: AMBIVALENCE

## 2024-09-13 SDOH — HEALTH STABILITY: MENTAL HEALTH: BEHAVIORAL HEALTH(WDL): EXCEPTIONS TO WDL

## 2024-09-13 SDOH — HEALTH STABILITY: MENTAL HEALTH: NEEDS EXPRESSED: DENIES

## 2024-09-13 SDOH — HEALTH STABILITY: MENTAL HEALTH: WAS THIS WITHIN THE PAST THREE MONTHS?: YES

## 2024-09-13 SDOH — HEALTH STABILITY: MENTAL HEALTH: SUICIDAL BEHAVIOR (3 MONTHS): NO

## 2024-09-13 SDOH — HEALTH STABILITY: MENTAL HEALTH: SLEEP PATTERN: RESTLESSNESS

## 2024-09-13 SDOH — HEALTH STABILITY: MENTAL HEALTH: HAVE YOU BEEN THINKING ABOUT HOW YOU MIGHT DO THIS?: YES

## 2024-09-13 SDOH — HEALTH STABILITY: MENTAL HEALTH: BEHAVIORS/MOOD: CALM;COOPERATIVE;FLAT AFFECT;FLIGHT OF IDEAS;HALLUCINATIONS;HYPER-VERBAL;PACING;RESTLESS;WANDERING

## 2024-09-13 SDOH — HEALTH STABILITY: MENTAL HEALTH: ACTIVE SUICIDAL IDEATION WITH SOME INTENT TO ACT, WITHOUT SPECIFIC PLAN (PAST 1 MONTH): YES

## 2024-09-13 ASSESSMENT — LIFESTYLE VARIABLES
PRESCIPTION_ABUSE_PAST_12_MONTHS: NO
HAVE PEOPLE ANNOYED YOU BY CRITICIZING YOUR DRINKING: NO
TOTAL SCORE: 0
EVER HAD A DRINK FIRST THING IN THE MORNING TO STEADY YOUR NERVES TO GET RID OF A HANGOVER: NO
SUBSTANCE_ABUSE_PAST_12_MONTHS: YES
HAVE YOU EVER FELT YOU SHOULD CUT DOWN ON YOUR DRINKING: NO
EVER FELT BAD OR GUILTY ABOUT YOUR DRINKING: NO

## 2024-09-13 ASSESSMENT — PAIN - FUNCTIONAL ASSESSMENT: PAIN_FUNCTIONAL_ASSESSMENT: 0-10

## 2024-09-13 ASSESSMENT — PAIN SCALES - GENERAL: PAINLEVEL_OUTOF10: 0 - NO PAIN

## 2024-09-13 NOTE — PROGRESS NOTES
"Observation Disposition Note  [facility] EMERGENCY MEDICINE             Subjective:       Nathan Vaughn has been under observation status in [facility] EMERGENCY MEDICINE for psychiatric illness monitoring and treatment while awaiting inpatient behavioral health bed availability.       Physical Exam     Visit Vitals  /70   Pulse 96   Temp 36.4 °C (97.5 °F)   Resp 16   Ht 1.778 m (5' 10\")   Wt 99.8 kg (220 lb)   SpO2 96%   BMI 31.57 kg/m²   Smoking Status Every Day   BSA 2.22 m²       GENERAL:  The patient appears nourished and normally developed. Vital signs as documented.     PULMONARY:  Without any respiratory distress. Able to speak full sentences, no accessory muscle use    CARDIAC: Warm and well perfused. No cyanosis.    MUSCULOSKELETAL:   Able to ambulate, Non edematous, with no obvious deformities.     SKIN: No pallor. Intact.    NEURO:  No obvious neurological deficits.  Able to follow commands.    Psych: calm, cooperative, SI      Impresssion and Plan     In summary, Nathan Vaughn has been cared under observation in [facility] for psychiatric illness monitoring and treatment while awaiting inpatient behavioral health bed availability.     Emergency Psychiatric Assessment Team was consulted. Case discussed with them and decision for inpatient hospitalization deemed necessary.     Patient has been accepted at [facility]    Total length of observation was 11 hours. Dr. Gregory att. providers found is the disposition attending.    Discharge Diagnosis  Mood disorder, depression    Kassie Alegria DO  "

## 2024-09-13 NOTE — ED TRIAGE NOTES
Pt presents with suicidal and homicidal ideations. He does not have anyone in particular who he would like to hurt and his plan to hurt himself is to do something so that the police can shoot him. Pt states that he did some weed, meth, cocaine and drank some liquor today

## 2024-09-13 NOTE — H&P
"Observation History and Physical  [facility] EMERGENCY MEDICINE           History of Present Illness     History provided by: Patient  Limitations to History: Mental Illness  External Records Reviewed: N/A      Patient History:  Nathan Vaughn is a 39 y.o. male who presented to the emergency department for suicidal ideation with plan.    Nathan Vaughn was escalated to observation status. Observation was necessary as they continue to require treatment and monitoring of their psychiatric illness while awaiting inpatient behavioral health bed availability.    Physical Exam     Visit Vitals  /65 (BP Location: Right leg, Patient Position: Lying)   Pulse 93   Temp 36.4 °C (97.5 °F) (Temporal)   Resp 18   Ht 1.778 m (5' 10\")   Wt 99.8 kg (220 lb)   SpO2 98%   BMI 31.57 kg/m²   Smoking Status Every Day   BSA 2.22 m²       GENERAL:  The patient appears nourished and normally developed. Vital signs as documented.     PULMONARY:  Without any respiratory distress. Able to speak full sentences, no accessory muscle use    CARDIAC: Warm and well perfused. No cyanosis.    MUSCULOSKELETAL:   Able to ambulate, Non edematous, with no obvious deformities.     SKIN: No pallor. Intact.    NEURO:  No obvious neurological deficits.  Able to follow commands.    Psych: calm, cooperative, active SI, no AVH, HI      Impression and Plan     Nathan Vaughn under observation status in [facility] EMERGENCY MEDICINE for psychiatric illness monitoring and treatment while awaiting inpatient behavioral health bed availability.   Emergency Psychiatric Assessment Team has been consulted. Case discussed with them and decision for inpatient hospitalization deemed necessary. Patient is medically clear at this time. Home medications reviewed and restarted where clinically indicated. Patient and Family updated on plan of care.     Kassie Alegria DO"

## 2024-09-13 NOTE — PROGRESS NOTES
EPAT - Social Work Psychiatric Assessment    Arrival Details  Mode of Arrival: Ambulatory  Admission Source: Other (Comment) (Community)  Admission Type: Voluntary  EPAT Assessment Start Date: 09/13/24  EPAT Assessment Start Time: 0300  Name of : Erica Vides MSW, LSW    History of Present Illness  Admission Reason: Psychiatric Evaulation  HPI: Nathan Vaughn is a 39 year old male brought to the ED for a psychiatric evaluation. Reportedly, “pt presents with suicidal and homicidal ideations. He does not have anyone in particular who he would like to hurt and his plan to hurt himself is to do something so that the police can shoot him. Pt states that he did some weed, meth, cocaine and drank some liquor today.” Pt’s chart, provider and triage note all reviewed prior to assessment. At triage pt is noted to be high risk. Pt has a psychiatric history of unspecified psychosis, antisocial personality disorder and substance abuse. Currently prescribed Zyprexa and Prozac. Receiving outpatient care at [facility]. Pt has extensive history of ED visits and inpatient psychiatric admissions. At arrival pt BAL is 18 and UDS positive for marijuana, amphetamines and cocaine.    SW Readmission Information   Readmission within 30 Days: Yes  Previous ED Visit Date and Reason : 8/30/23024 - SI / Intox  Previous Discharge Date and Location: 9/10/2024 / W  Factors Contributing to  Readmission Inpatient/ED (Team Perspective): Homeless, Lack of Community Support, Lack of Family/Friend Support, Substance Abuse    Psychiatric Symptoms  Anxiety Symptoms: Generalized, Feelings of doom  Depression Symptoms: Feelings of helplessness, Feelings of hopelessess, Feelings of worthlessness, Loss of interest, Crying  Gely Symptoms: No problems reported or observed.    Psychosis Symptoms  Hallucination Type: No problems reported or observed.  Delusion Type: No problems reported or observed.    Additional Symptoms -  Adult  Generalized Anxiety Disorder: Difficult to control worry, Difficulity concentrating  Obsessive Compulsive Disorder: No problems reported or observed.  Panic Attack: No problems reported or observed.  Post Traumatic Stress Disorder: No problems reported or observed.  Delirium: No problems reported or observed.    Past Psychiatric History/Meds/Treatments  Past Psychiatric History: hx of psychosis nos, antisocial personality dx and substance abuse  Past Psychiatric Meds/Treatments: zyprexa & prozac    Current Mental Health Contacts   Name/Phone Number: KAPIL Reyes - [phone]    Living Arrangement: Homeless    Income Information  Employment Status for: Patient  Employment Status: Disabled  Income Source: Disability    [employer] Service/Education History  Current or Previous  Service: None  Education Level: High school    Social/Cultural History  Social History: pt is a [place], no guardian, no payee  Cultural Requests During Hospitalization: pt denies  Spiritual Requests During Hospitalization: pt denies    Legal  Legal Considerations: Patient/ Family Capacity to Make Sound Judgments  Legal Concerns: [id number] - attempted drug possession    Drug Screening  Have you used any substances (canabis, cocaine, heroin, hallucinogens, inhalants, etc.) in the past 12 months?: Yes  Have you used any prescription drugs other than prescribed in the past 12 months?: No  Is a toxicology screen needed?: Yes    Stage of Change  Stage of Change: Preparation  History of Treatment: IOP  Type of Treatment Offered: Inpatient  Treatment Offered: Accepted  Duration of Substance Use: 3+ years  Frequency of Substance Use: daily    Behavioral Health  Behaviors/Mood: Calm, Cooperative  Affect: Appropriate to circumstances    Orientation  Orientation Level: Oriented X4    General Appearance  Motor Activity: Hyperactivity, Restlessness  Speech Pattern:  (unremarkable)  General Attitude:  Cooperative  Appearance/Hygiene: Unremarkable    Thought Process  Coherency: Circumstantial  Content: Unremarkable  Delusions:  (None)  Perception: Not altered  Hallucination: None  Judgment/Insight: Impaired  Confusion: None  Cognition: Follows commands, Poor judgement, Poor safety awareness    Sleep Pattern  Sleep Pattern: Restlessness    Risk Factors  Self Harm/Suicidal Ideation Plan: to jump off a bridge  Previous Self Harm/Suicidal Plans: pt reports hx of cutting  Risk Factors: Major mental illness, Male, Lower socioeconomic status, Personality disorder (antisocial, borderline), Substance abuse    Violence Risk Assessment  Thoughts of Harm to Others: No    Ability to Assess Risk Screen  Risk Screen - Ability to Assess: Able to be screened  Sherman Suicide Severity Rating Scale (Screener/Recent Self-Report)  1. Wish to be Dead (Past 1 Month): Yes  2. Non-Specific Active Suicidal Thoughts (Past 1 Month): Yes  3. Active Suicidal Ideation with any Methods (Not Plan) Without Intent to Act (Past 1 Month): Yes  4. Active Suicidal Ideation with Some Intent to Act, Without Specific Plan (Past 1 Month): Yes  5. Active Suicidal Ideation with Specific Plan and Intent (Past 1 Month): Yes  6. Suicidal Behavior (Lifetime): Yes  6. Suicidal Behavior (3 Months): No  6. Suicidal Behavior (Description): pt states previous suicide attempt via cutting  Calculated C-SSRS Risk Score (Lifetime/Recent): High Risk  Step 1: Risk Factors  Current & Past Psychiatric Dx: Psychotic disorder, Alcohol/substance abuse disorders, Cluster B personality disorders or traits (i.e., borderline, antisocial, histrionic & narcissistic)  Presenting Symptoms: Anxiety and/or panic, Hopelessness or despair, Impulsivity, Anhedonia  Precipitants/Stressors: Triggering events leading to humiliation, shame, and/or despair (e.g. loss of relationship, financial or health status) (real or anticipated), Social isolation, Inadequate social supports, Pending  incarceration or homelessness, Substance intoxication or withdrawal  Change in Treatment: Recent inpatient discharge  Access to Lethal Methods : No  Step 3: Suicidal Ideation Intensity  How Many Times Have You Had These Thoughts: Daily or almost daily  When You Have the Thoughts How Long do They Last : 4-8 hours/most of the day  Could/Can You Stop Thinking About Killing Yourself or Wanting to Die if You Want to: Can control thoughts with a lot of difficulty  Are There Things - Anyone or Anything - That Stopped You From Wanting to Die or Acting on: Uncertain that deterrents stopped you  What Sort of Reasons Did You Have For Thinking About Wanting to Die or Killing Yourself: Mostly to end or stop the pain (you couldn't go on living with the pain or how you were feeling)  Total Score: 19  Step 5: Documentation  Risk Level: High suicide risk    Psychiatric Impression and Plan of Care  Assessment and Plan:    On assessment pt is sitting upright in his hospital bed. Pt presents A&O x4, demonstrating circumstantial thought process. Presented with depressed mood and flat affect, at times during assessment pt becomes tearful. Speech was normal with respect to rate, volume and tone. There were no loosening of associations or delusions present. Pt remains calm and cooperative throughout the assessment. Pt attributes his heightened anxiety/restlessness to drug use a few hours prior to ED presentation. Pt states that he is currently suicidal. States that if he is discharged from the ED he will “go and jump off a bridge.” Attributes these feelings to failing to maintain sobriety. Pt states that he was discharged from [facility] on Tuesday 9/10/24. At discharge he declined being connected to outpatient substance abuse treatment options and ended up relapsing.     Pt also expressing HI towards no one specific. Pt states that he is “upset with the world.” States that he has no plan or intent on actually physically harming someone. Pt  "denies access to weapons such as guns. Pt states that he has an hx of NSSIB, such as cutting, but it has been “a long time.” Pt denies all AVH at the time of assessment.     Diagnostic Impressions: Unspecified Mood Disorder         Stimulant Use Disorder     Psychiatric Recommendations and Plan for Care:  At this time pt presents as an elevated risk of harm to self or others. Recommendation for inpatient dual dx admission. Discussed with Lon Sky MD who is in agreement.     Outcome/Disposition  Patient's Perception of Outcome Achieved: \"if i get discharged i will kill myself\"  Assessment, Recommendations and Risk Level Reviewed with: Lon Sky MD  EPAT Assessment Completed Date: 09/13/24  EPAT Assessment Completed Time: 0345      "

## 2024-09-13 NOTE — SIGNIFICANT EVENT
Application for Emergency Admission      Ready for Transfer?  Is the patient medically cleared for transfer to inpatient psychiatry: Yes  Has the patient been accepted to an inpatient psychiatric hospital: No    Application for Emergency Admission  IN ACCORDANCE WITH SECTION 5122.10 [place]  The Chief Clinical Officer of: Elizabeth Rasmussen 9/13/2024 .7:03 AM    Reason for Hospitalization  The undersigned has reason to believe that: Nathan Vaughn Is a mentally ill person subject to hospitalization by court order under division B Section 5122.01 of Revised Code, i.e., this person:    1.Yes  Represents a substantial risk of physical harm to self as manifested by evidence of threats of, or attempts at, suicide or serious self-inflicted bodily harm    2.No Represents a substantial risk of physical harm to others as manifested by evidence of recent homicidal or other violent behavior, evidence of recent threats that place another in reasonable fear of violent behavior and serious physical harm, or other evidence of present dangerousness    3.No Represents a substantial and immediate risk of serious physical impairment or injury to self as manifested by  evidence that the person is unable to provide for and is not providing for the person's basic physical needs because of the person's mental illness and that appropriate provision for those needs cannot be made  immediately available in the community    4.Yes Would benefit from treatment in a hospital for his mental illness and is in need of such treatment as manifested by evidence of behavior that creates a grave and imminent risk to substantial rights of others or  himself.    5.Yes Would benefit from treatment as manifested by evidence of behavior that indicates all of the following:       (a) The person is unlikely to survive safely in the community without supervision, based on a clinical determination.       (b) The person has a history of lack of compliance with  treatment for mental illness and one of the following applies:      (i) At least twice within the thirty-six months prior to the filing of an affidavit seeking court-ordered treatment of the person under section 5122.111 of the Revised Code, the lack of compliance has been a significant factor in necessitating hospitalization in a hospital or receipt of services in a forensic or other mental health unit of a correctional facility, provided that the thirty-six-month period shall be extended by the length of any hospitalization or incarceration of the person that occurred within the thirty-six-month period.      (ii) Within the forty-eight months prior to the filing of an affidavit seeking court-ordered treatment of the person under section 5122.111 of the Revised Code, the lack of compliance resulted in one or more acts of serious violent behavior toward self or others or threats of, or attempts at, serious physical harm to self or others, provided that the forty-eight-month period shall be extended by the length of any hospitalization or incarceration of the person that occurred within the forty-eight-month period.      (c) The person, as a result of mental illness, is unlikely to voluntarily participate in necessary treatment.       (d) In view of the person's treatment history and current behavior, the person is in need of treatment in order to prevent a relapse or deterioration that would be likely to result in substantial risk of serious harm to the person or others.    (e) Represents a substantial risk of physical harm to self or others if allowed to remain at liberty pending examination.    Therefore, it is requested that said person be admitted to the above named facility.    STATEMENT OF BELIEF    On assessment pt is sitting upright in his hospital bed. Pt presents A&O x4, demonstrating circumstantial thought process. Presented with depressed mood and flat affect, at times during assessment pt becomes  tearful. Speech was normal with respect to rate, volume and tone. There were no loosening of associations or delusions present. Pt remains calm and cooperative throughout the assessment. Pt attributes his heightened anxiety/restlessness to drug use a few hours prior to ED presentation. Pt states that he is currently suicidal. States that if he is discharged from the ED he will “go and jump off a bridge.” Attributes these feelings to failing to maintain sobriety. Pt states that he was discharged from [facility] on Tuesday 9/10/24. At discharge he declined being connected to outpatient substance abuse treatment options and ended up relapsing. Pt also expressing HI towards no one specific. Pt states that he is “upset with the world.” States that he has no plan or intent on actually physically harming someone. Pt denies access to weapons such as guns. Pt states that he has an hx of NSSIB, such as cutting, but it has been “a long time.” Pt denies all AVH at the time of assessment.     Lon Sky MD 9/13/2024     _____________________________________________________________   Place of Employment: [facility]    STATEMENT OF OBSERVATION BY PSYCHIATRIST, LICENSED PHYSICIAN, OR LICENSED CLINICAL PSYCHOLOGIST, IF APPLICABLE    Place of Observation (e.g., [facility], general hospital, office, emergency facility)  (If applicable, please complete)    Lon Sky MD 9/13/2024    _____________________________________________________________

## 2024-09-13 NOTE — ED PROVIDER NOTES
CC: Suicidal     HPI:  Patient is a 39-year-old male with a past medical history of bipolar disorder, schizoaffective disorder, and polysubstance use disorder presents to the ED for suicidal and homicidal ideation.  He notes suicidal ideation secondary to previous trauma.  Stated that he will overdose on drugs if he he is not admitted to inpatient psychiatric hospitalization he notes general homicidal ideation to police.  Patient reports using weed, meth, cocaine, and alcohol.  Denies fevers, chills, chest pain, difficulty breathing, headache, abdominal pain, back pain, neck pain, trauma, and falls.      Limitations to history: None  Independent historian(s): Patient  Records Reviewed: Recent available ED and inpatient notes reviewed in EMR.    PMHx/PSHx:  Per HPI.   - has a past medical history of Aggressive behavior (07/26/2019), Cannabis use disorder (03/15/2023), Cellulitis (11/01/2021), Electrolyte disorder (07/23/2019), Homicidal thoughts (11/04/2023), Methamphetamine intoxication (Multi) (07/24/2019), Polysubstance dependence, non-opioid, in remission (Multi) (04/15/2022), Renal disease (07/23/2019), Stimulant use disorder (03/15/2023), Substance-induced psychotic disorder (Multi) (03/13/2023), and Thrombocytosis (11/04/2021).  - has no past surgical history on file.    Medications:  Reviewed in EMR. See EMR for complete list of medications and doses.    Allergies:  Patient has no known allergies.    Social History:  - Tobacco:  reports that he has been smoking cigarettes. He has a 20 pack-year smoking history. He has never used smokeless tobacco.   - Alcohol:  reports current alcohol use.   - Illicit Drugs:  reports current drug use. Drugs: Methamphetamines, Cocaine, and Marijuana.     ROS:  Per HPI.       ???????????????????????????????????????????????????????????????  Triage Vitals:  T 36.4 °C (97.6 °F)  HR (!) 121  BP (!) 141/94  RR 20  O2 97 %      Physical Exam  Vitals and nursing note reviewed.    Constitutional:       General: He is not in acute distress.  HENT:      Head: Normocephalic and atraumatic.      Nose: Nose normal.      Mouth/Throat:      Mouth: Mucous membranes are moist.   Eyes:      Conjunctiva/sclera: Conjunctivae normal.   Cardiovascular:      Rate and Rhythm: Normal rate and regular rhythm.      Pulses: Normal pulses.   Pulmonary:      Effort: Pulmonary effort is normal. No respiratory distress.      Breath sounds: Normal breath sounds.   Abdominal:      Palpations: Abdomen is soft.      Tenderness: There is no abdominal tenderness.   Skin:     General: Skin is warm.   Neurological:      General: No focal deficit present.      Mental Status: He is alert.      Cranial Nerves: No cranial nerve deficit.      Coordination: Coordination normal.      Gait: Gait normal.   Psychiatric:      Comments: Appears internally stimulated.  Notes SI and HI.  Denied AVH.         ???????????????????????????????????????????????????????????????  Labs:   Labs Reviewed - No data to display     Imaging:   No orders to display        EKG:  Rate is 122, sinus rhythm, normal axis, no interval prolongation, no st elevation or depression.  When compared to EKG on 8/30/2024 review of EKG does not show any signs of STEMI, complete heart block, asystole, V-fib.    MDM:  Patient is a 39-year-old male with a past medical history of bipolar disorder, schizoaffective disorder, and polysubstance use disorder presents to the ED for suicidal and homicidal ideation.  Patient presented tachycardic and hypertensive.  Vitals likely secondary to substance use.  Physical exam finding significant for patient having SI and HI as well as appears internally stimulated.  Concern for tox versus psych related etiology of the patient's presentation.  Low clinical concern for acute infectious process, metabolic process, traumatic process, and vascular process.  Patient administered home psychiatric medications.  Patient noted to continue to  be anxious and easily agitated. He requested and was administered 5 mg of Haldol.  Patient still noted to be anxious and was administered 2 mg oral Ativan for recent cocaine use.  Psychiatric workup initiated.  Basic labs were unremarkable.  UDS positive for amphetamines, cannabinoids, and cocaine metabolite.  CK elevated at 1264.  Alcohol level was noted be elevated 18.  EPAT evaluated the patient recommended inpatient psychiatric hospitalization.  Patient notified of EPAT's recommendation of inpatient psychiatric hospitalization.  Patient is agreeable with placement.    ED Course:  ED Course as of 09/15/24 0312   Fri Sep 13, 2024   0655 Creatinine: 1.11 [FN]   0656 Creatine Kinase(!): 1,264 [FN]      ED Course User Index  [FN] Jaelyn Crawford MD         Diagnoses as of 09/15/24 0312   Suicidal ideation       Social Determinants Limiting Care:  Mental health issues    Disposition:  Patient signed out to oncoming provider pending EPAT placement.    Lon Sky MD   Emergency Medicine PGY-3  [facility]    Comment: Please note this report has been produced using speech recognition software and may contain errors related to that system including errors in grammar, punctuation, and spelling as well as words and phrases that may be inappropriate.  If there are any questions or concerns please feel free to contact the dictating provider for clarification.    Procedures ? [employer] last updated 9/13/2024 1:30 AM        Lon Sky MD  Resident  09/15/24 [id number]       Jaelyn Crawford MD  09/15/24 [id number]

## 2024-10-04 ENCOUNTER — CLINICAL SUPPORT (OUTPATIENT)
Dept: EMERGENCY MEDICINE | Facility: HOSPITAL | Age: 39
End: 2024-10-04
Payer: MEDICARE

## 2024-10-04 ENCOUNTER — HOSPITAL ENCOUNTER (OUTPATIENT)
Facility: HOSPITAL | Age: 39
Setting detail: OBSERVATION
End: 2024-10-04
Attending: EMERGENCY MEDICINE | Admitting: EMERGENCY MEDICINE
Payer: MEDICARE

## 2024-10-04 DIAGNOSIS — R45.851 SUICIDAL IDEATION: Primary | ICD-10-CM

## 2024-10-04 PROBLEM — F22 PSYCHOSIS, PARANOID (MULTI): Status: ACTIVE | Noted: 2024-10-04

## 2024-10-04 LAB
ALBUMIN SERPL BCP-MCNC: 4.8 G/DL (ref 3.4–5)
ALP SERPL-CCNC: 90 U/L (ref 33–120)
ALT SERPL W P-5'-P-CCNC: 24 U/L (ref 10–52)
AMPHETAMINES UR QL SCN: ABNORMAL
ANION GAP SERPL CALC-SCNC: 15 MMOL/L (ref 10–20)
APAP SERPL-MCNC: <10 UG/ML
APPEARANCE UR: ABNORMAL
AST SERPL W P-5'-P-CCNC: 36 U/L (ref 9–39)
BARBITURATES UR QL SCN: ABNORMAL
BASOPHILS # BLD AUTO: 0.02 X10*3/UL (ref 0–0.1)
BASOPHILS NFR BLD AUTO: 0.3 %
BENZODIAZ UR QL SCN: ABNORMAL
BILIRUB SERPL-MCNC: 1 MG/DL (ref 0–1.2)
BILIRUB UR STRIP.AUTO-MCNC: NEGATIVE MG/DL
BUN SERPL-MCNC: 18 MG/DL (ref 6–23)
BZE UR QL SCN: ABNORMAL
CALCIUM SERPL-MCNC: 10.2 MG/DL (ref 8.6–10.6)
CANNABINOIDS UR QL SCN: ABNORMAL
CHLORIDE SERPL-SCNC: 103 MMOL/L (ref 98–107)
CK SERPL-CCNC: 1756 U/L (ref 0–325)
CO2 SERPL-SCNC: 25 MMOL/L (ref 21–32)
COLOR UR: YELLOW
CREAT SERPL-MCNC: 1.2 MG/DL (ref 0.5–1.3)
EGFRCR SERPLBLD CKD-EPI 2021: 80 ML/MIN/1.73M*2
EOSINOPHIL # BLD AUTO: 0.01 X10*3/UL (ref 0–0.7)
EOSINOPHIL NFR BLD AUTO: 0.2 %
ERYTHROCYTE [DISTWIDTH] IN BLOOD BY AUTOMATED COUNT: 14.6 % (ref 11.5–14.5)
ETHANOL SERPL-MCNC: 18 MG/DL
FENTANYL+NORFENTANYL UR QL SCN: ABNORMAL
GLUCOSE SERPL-MCNC: 112 MG/DL (ref 74–99)
GLUCOSE UR STRIP.AUTO-MCNC: NORMAL MG/DL
HCT VFR BLD AUTO: 39.7 % (ref 41–52)
HGB BLD-MCNC: 13.5 G/DL (ref 13.5–17.5)
HOLD SPECIMEN: NORMAL
IMM GRANULOCYTES # BLD AUTO: 0.01 X10*3/UL (ref 0–0.7)
IMM GRANULOCYTES NFR BLD AUTO: 0.2 % (ref 0–0.9)
KETONES UR STRIP.AUTO-MCNC: ABNORMAL MG/DL
LEUKOCYTE ESTERASE UR QL STRIP.AUTO: NEGATIVE
LYMPHOCYTES # BLD AUTO: 1.95 X10*3/UL (ref 1.2–4.8)
LYMPHOCYTES NFR BLD AUTO: 30 %
MCH RBC QN AUTO: 27.7 PG (ref 26–34)
MCHC RBC AUTO-ENTMCNC: 34 G/DL (ref 32–36)
MCV RBC AUTO: 81 FL (ref 80–100)
METHADONE UR QL SCN: ABNORMAL
MONOCYTES # BLD AUTO: 0.73 X10*3/UL (ref 0.1–1)
MONOCYTES NFR BLD AUTO: 11.2 %
MUCOUS THREADS #/AREA URNS AUTO: ABNORMAL /LPF
NEUTROPHILS # BLD AUTO: 3.77 X10*3/UL (ref 1.2–7.7)
NEUTROPHILS NFR BLD AUTO: 58.1 %
NITRITE UR QL STRIP.AUTO: NEGATIVE
NRBC BLD-RTO: 0 /100 WBCS (ref 0–0)
OPIATES UR QL SCN: ABNORMAL
OXYCODONE+OXYMORPHONE UR QL SCN: ABNORMAL
PCP UR QL SCN: ABNORMAL
PH UR STRIP.AUTO: 5.5 [PH]
PLATELET # BLD AUTO: 492 X10*3/UL (ref 150–450)
POTASSIUM SERPL-SCNC: 3.5 MMOL/L (ref 3.5–5.3)
PROT SERPL-MCNC: 9.1 G/DL (ref 6.4–8.2)
PROT UR STRIP.AUTO-MCNC: ABNORMAL MG/DL
RBC # BLD AUTO: 4.88 X10*6/UL (ref 4.5–5.9)
RBC # UR STRIP.AUTO: ABNORMAL /UL
RBC #/AREA URNS AUTO: ABNORMAL /HPF
SALICYLATES SERPL-MCNC: <3 MG/DL
SODIUM SERPL-SCNC: 139 MMOL/L (ref 136–145)
SP GR UR STRIP.AUTO: 1.03
UROBILINOGEN UR STRIP.AUTO-MCNC: NORMAL MG/DL
WBC # BLD AUTO: 6.5 X10*3/UL (ref 4.4–11.3)
WBC #/AREA URNS AUTO: ABNORMAL /HPF

## 2024-10-04 PROCEDURE — 80143 DRUG ASSAY ACETAMINOPHEN: CPT

## 2024-10-04 PROCEDURE — 80053 COMPREHEN METABOLIC PANEL: CPT

## 2024-10-04 PROCEDURE — 80320 DRUG SCREEN QUANTALCOHOLS: CPT

## 2024-10-04 PROCEDURE — G0378 HOSPITAL OBSERVATION PER HR: HCPCS

## 2024-10-04 PROCEDURE — 82550 ASSAY OF CK (CPK): CPT

## 2024-10-04 PROCEDURE — 99285 EMERGENCY DEPT VISIT HI MDM: CPT

## 2024-10-04 PROCEDURE — 85025 COMPLETE CBC W/AUTO DIFF WBC: CPT

## 2024-10-04 PROCEDURE — 96372 THER/PROPH/DIAG INJ SC/IM: CPT

## 2024-10-04 PROCEDURE — 2500000004 HC RX 250 GENERAL PHARMACY W/ HCPCS (ALT 636 FOR OP/ED)

## 2024-10-04 PROCEDURE — 36415 COLL VENOUS BLD VENIPUNCTURE: CPT

## 2024-10-04 PROCEDURE — 80307 DRUG TEST PRSMV CHEM ANLYZR: CPT

## 2024-10-04 PROCEDURE — 93005 ELECTROCARDIOGRAM TRACING: CPT

## 2024-10-04 PROCEDURE — 81001 URINALYSIS AUTO W/SCOPE: CPT

## 2024-10-04 RX ORDER — LORAZEPAM 0.5 MG/1
2 TABLET ORAL ONCE
Status: DISCONTINUED | OUTPATIENT
Start: 2024-10-04 | End: 2024-10-04

## 2024-10-04 RX ORDER — HALOPERIDOL 5 MG/ML
INJECTION INTRAMUSCULAR
Status: COMPLETED
Start: 2024-10-04 | End: 2024-10-04

## 2024-10-04 RX ORDER — OLANZAPINE 5 MG/1
5 TABLET ORAL NIGHTLY
Status: DISPENSED | OUTPATIENT
Start: 2024-10-04

## 2024-10-04 RX ORDER — HALOPERIDOL 5 MG/ML
5 INJECTION INTRAMUSCULAR ONCE
Status: COMPLETED | OUTPATIENT
Start: 2024-10-04 | End: 2024-10-04

## 2024-10-04 RX ORDER — MIDAZOLAM HYDROCHLORIDE 5 MG/ML
INJECTION, SOLUTION INTRAMUSCULAR; INTRAVENOUS
Status: COMPLETED
Start: 2024-10-04 | End: 2024-10-04

## 2024-10-04 RX ORDER — ARIPIPRAZOLE 5 MG/1
10 TABLET ORAL ONCE
Status: DISCONTINUED | OUTPATIENT
Start: 2024-10-04 | End: 2024-10-04

## 2024-10-04 RX ORDER — OLANZAPINE 10 MG/2ML
5 INJECTION, POWDER, FOR SOLUTION INTRAMUSCULAR EVERY 6 HOURS PRN
Status: ACTIVE | OUTPATIENT
Start: 2024-10-04

## 2024-10-04 RX ORDER — HYDROXYZINE PAMOATE 25 MG/1
1-2 CAPSULE ORAL 3 TIMES DAILY PRN
COMMUNITY

## 2024-10-04 RX ORDER — HYDROXYZINE HYDROCHLORIDE 10 MG/1
10 TABLET, FILM COATED ORAL NIGHTLY
Status: DISPENSED | OUTPATIENT
Start: 2024-10-04

## 2024-10-04 RX ORDER — MIDAZOLAM HYDROCHLORIDE 1 MG/ML
5 INJECTION INTRAMUSCULAR; INTRAVENOUS ONCE
Status: COMPLETED | OUTPATIENT
Start: 2024-10-04 | End: 2024-10-04

## 2024-10-04 RX ORDER — TRAZODONE HYDROCHLORIDE 50 MG/1
50 TABLET ORAL NIGHTLY PRN
COMMUNITY

## 2024-10-04 RX ORDER — OLANZAPINE 5 MG/1
5 TABLET ORAL EVERY 6 HOURS PRN
Status: DISPENSED | OUTPATIENT
Start: 2024-10-04

## 2024-10-04 RX ADMIN — MIDAZOLAM HYDROCHLORIDE 5 MG: 1 INJECTION, SOLUTION INTRAMUSCULAR; INTRAVENOUS at 17:06

## 2024-10-04 RX ADMIN — HALOPERIDOL 5 MG: 5 INJECTION INTRAMUSCULAR at 17:05

## 2024-10-04 RX ADMIN — HALOPERIDOL LACTATE 5 MG: 5 INJECTION, SOLUTION INTRAMUSCULAR at 17:05

## 2024-10-04 SDOH — HEALTH STABILITY: MENTAL HEALTH: DELUSIONS: PARANOID

## 2024-10-04 SDOH — HEALTH STABILITY: MENTAL HEALTH: BEHAVIORAL HEALTH(WDL): EXCEPTIONS TO WDL

## 2024-10-04 SDOH — HEALTH STABILITY: MENTAL HEALTH: SLEEP PATTERN: UNABLE TO ASSESS

## 2024-10-04 SDOH — HEALTH STABILITY: MENTAL HEALTH: BEHAVIORS/MOOD: CALM;COOPERATIVE

## 2024-10-04 SDOH — HEALTH STABILITY: MENTAL HEALTH

## 2024-10-04 SDOH — SOCIAL STABILITY: SOCIAL NETWORK: VISITOR BEHAVIORS: UNABLE TO ASSESS

## 2024-10-04 SDOH — HEALTH STABILITY: MENTAL HEALTH: NEEDS EXPRESSED: OTHER (COMMENT)

## 2024-10-04 SDOH — HEALTH STABILITY: MENTAL HEALTH: CONTENT: BLAMING OTHERS;DELUSIONS;PREOCCUPATION

## 2024-10-04 SDOH — SOCIAL STABILITY: SOCIAL NETWORK: EMOTIONAL SUPPORT GIVEN: REASSURE

## 2024-10-04 SDOH — SOCIAL STABILITY: SOCIAL NETWORK: PARENT/GUARDIAN/SIGNIFICANT OTHER INVOLVEMENT: NO INVOLVEMENT

## 2024-10-04 SDOH — SOCIAL STABILITY: SOCIAL INSECURITY: FAMILY BEHAVIORS: UNABLE TO ASSESS

## 2024-10-04 SDOH — HEALTH STABILITY: MENTAL HEALTH: BEHAVIORS/MOOD: ANXIOUS;COOPERATIVE

## 2024-10-04 SDOH — HEALTH STABILITY: MENTAL HEALTH: BEHAVIORS/MOOD: ANXIOUS;DELUSIONS;FLIGHT OF IDEAS;HALLUCINATIONS;HYPER-VERBAL;IMPULSIVE;PARANOID;RESTLESS;COOPERATIVE

## 2024-10-04 ASSESSMENT — PAIN SCALES - GENERAL
PAINLEVEL_OUTOF10: 0 - NO PAIN
PAINLEVEL_OUTOF10: 0 - NO PAIN

## 2024-10-04 ASSESSMENT — PAIN - FUNCTIONAL ASSESSMENT: PAIN_FUNCTIONAL_ASSESSMENT: 0-10

## 2024-10-04 ASSESSMENT — COLUMBIA-SUICIDE SEVERITY RATING SCALE - C-SSRS
2. HAVE YOU ACTUALLY HAD ANY THOUGHTS OF KILLING YOURSELF?: YES
1. IN THE PAST MONTH, HAVE YOU WISHED YOU WERE DEAD OR WISHED YOU COULD GO TO SLEEP AND NOT WAKE UP?: YES
6. HAVE YOU EVER DONE ANYTHING, STARTED TO DO ANYTHING, OR PREPARED TO DO ANYTHING TO END YOUR LIFE?: YES
5. HAVE YOU STARTED TO WORK OUT OR WORKED OUT THE DETAILS OF HOW TO KILL YOURSELF? DO YOU INTEND TO CARRY OUT THIS PLAN?: YES
4. HAVE YOU HAD THESE THOUGHTS AND HAD SOME INTENTION OF ACTING ON THEM?: YES
6. HAVE YOU EVER DONE ANYTHING, STARTED TO DO ANYTHING, OR PREPARED TO DO ANYTHING TO END YOUR LIFE?: NO

## 2024-10-04 ASSESSMENT — PAIN DESCRIPTION - PROGRESSION: CLINICAL_PROGRESSION: NOT CHANGED
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
## 2024-10-04 NOTE — ED TRIAGE NOTES
"Pt to ED with c/o \"I need to kill somebody and go to residential, to get a break.\" \"I need to kill myself, someone, or just get away.\"   "
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
## 2024-10-05 LAB
ANION GAP SERPL CALC-SCNC: 11 MMOL/L (ref 10–20)
BUN SERPL-MCNC: 14 MG/DL (ref 6–23)
CALCIUM SERPL-MCNC: 8.8 MG/DL (ref 8.6–10.6)
CHLORIDE SERPL-SCNC: 101 MMOL/L (ref 98–107)
CK SERPL-CCNC: 1462 U/L (ref 0–325)
CK SERPL-CCNC: 1511 U/L (ref 0–325)
CK SERPL-CCNC: 2531 U/L (ref 0–325)
CO2 SERPL-SCNC: 27 MMOL/L (ref 21–32)
CREAT SERPL-MCNC: 0.97 MG/DL (ref 0.5–1.3)
EGFRCR SERPLBLD CKD-EPI 2021: >90 ML/MIN/1.73M*2
GLUCOSE SERPL-MCNC: 84 MG/DL (ref 74–99)
HOLD SPECIMEN: NORMAL
HOLD SPECIMEN: NORMAL
POTASSIUM SERPL-SCNC: 4.2 MMOL/L (ref 3.5–5.3)
SODIUM SERPL-SCNC: 135 MMOL/L (ref 136–145)

## 2024-10-05 PROCEDURE — 36415 COLL VENOUS BLD VENIPUNCTURE: CPT | Performed by: NURSE PRACTITIONER

## 2024-10-05 PROCEDURE — 2500000001 HC RX 250 WO HCPCS SELF ADMINISTERED DRUGS (ALT 637 FOR MEDICARE OP)

## 2024-10-05 PROCEDURE — 82550 ASSAY OF CK (CPK): CPT

## 2024-10-05 PROCEDURE — 36415 COLL VENOUS BLD VENIPUNCTURE: CPT

## 2024-10-05 PROCEDURE — 80048 BASIC METABOLIC PNL TOTAL CA: CPT | Performed by: NURSE PRACTITIONER

## 2024-10-05 PROCEDURE — G0378 HOSPITAL OBSERVATION PER HR: HCPCS

## 2024-10-05 PROCEDURE — 96360 HYDRATION IV INFUSION INIT: CPT

## 2024-10-05 PROCEDURE — 2500000002 HC RX 250 W HCPCS SELF ADMINISTERED DRUGS (ALT 637 FOR MEDICARE OP, ALT 636 FOR OP/ED)

## 2024-10-05 PROCEDURE — 82550 ASSAY OF CK (CPK): CPT | Performed by: NURSE PRACTITIONER

## 2024-10-05 PROCEDURE — 96361 HYDRATE IV INFUSION ADD-ON: CPT

## 2024-10-05 PROCEDURE — 2500000004 HC RX 250 GENERAL PHARMACY W/ HCPCS (ALT 636 FOR OP/ED): Performed by: NURSE PRACTITIONER

## 2024-10-05 RX ADMIN — HYDROXYZINE HYDROCHLORIDE 10 MG: 10 TABLET, FILM COATED ORAL at 02:00

## 2024-10-05 RX ADMIN — OLANZAPINE 5 MG: 5 TABLET, FILM COATED ORAL at 17:55

## 2024-10-05 RX ADMIN — OLANZAPINE 5 MG: 5 TABLET, FILM COATED ORAL at 00:07

## 2024-10-05 RX ADMIN — OLANZAPINE 5 MG: 5 TABLET, FILM COATED ORAL at 21:47

## 2024-10-05 RX ADMIN — SODIUM CHLORIDE, POTASSIUM CHLORIDE, SODIUM LACTATE AND CALCIUM CHLORIDE 2000 ML: 600; 310; 30; 20 INJECTION, SOLUTION INTRAVENOUS at 17:43

## 2024-10-05 RX ADMIN — SODIUM CHLORIDE, POTASSIUM CHLORIDE, SODIUM LACTATE AND CALCIUM CHLORIDE 2000 ML: 600; 310; 30; 20 INJECTION, SOLUTION INTRAVENOUS at 10:06

## 2024-10-05 RX ADMIN — HYDROXYZINE HYDROCHLORIDE 10 MG: 10 TABLET, FILM COATED ORAL at 21:42

## 2024-10-05 SDOH — HEALTH STABILITY: MENTAL HEALTH: BEHAVIORAL HEALTH(WDL): EXCEPTIONS TO WDL

## 2024-10-05 SDOH — HEALTH STABILITY: MENTAL HEALTH: SUICIDE ASSESSMENT: ADULT (C-SSRS)

## 2024-10-05 SDOH — HEALTH STABILITY: MENTAL HEALTH: BEHAVIORS/MOOD: COOPERATIVE;CALM

## 2024-10-05 SDOH — HEALTH STABILITY: MENTAL HEALTH

## 2024-10-05 SDOH — HEALTH STABILITY: MENTAL HEALTH: HAVE YOU EVER DONE ANYTHING, STARTED TO DO ANYTHING, OR PREPARED TO DO ANYTHING TO END YOUR LIFE?: YES

## 2024-10-05 SDOH — HEALTH STABILITY: MENTAL HEALTH: BEHAVIORS/MOOD: CALM

## 2024-10-05 SDOH — HEALTH STABILITY: MENTAL HEALTH: BEHAVIORS/MOOD: SAD

## 2024-10-05 SDOH — HEALTH STABILITY: MENTAL HEALTH: BEHAVIORS/MOOD: CALM;SLEEPING

## 2024-10-05 SDOH — HEALTH STABILITY: MENTAL HEALTH: BEHAVIORS/MOOD: CALM;COOPERATIVE

## 2024-10-05 SDOH — HEALTH STABILITY: MENTAL HEALTH: HAVE YOU BEEN THINKING ABOUT HOW YOU MIGHT DO THIS?: YES

## 2024-10-05 SDOH — HEALTH STABILITY: MENTAL HEALTH: HAVE YOU ACTUALLY HAD ANY THOUGHTS OF KILLING YOURSELF?: YES

## 2024-10-05 SDOH — HEALTH STABILITY: MENTAL HEALTH
HAVE YOU STARTED TO WORK OUT OR WORKED OUT THE DETAILS OF HOW TO KILL YOURSELF? DO YOU INTENT TO CARRY OUT THIS PLAN?: YES

## 2024-10-05 SDOH — HEALTH STABILITY: MENTAL HEALTH: HAVE YOU WISHED YOU WERE DEAD OR WISHED YOU COULD GO TO SLEEP AND NOT WAKE UP?: YES

## 2024-10-05 SDOH — HEALTH STABILITY: MENTAL HEALTH: HAVE YOU HAD THESE THOUGHTS AND HAD SOME INTENTION OF ACTING ON THEM?: YES

## 2024-10-05 SDOH — HEALTH STABILITY: MENTAL HEALTH: HALLUCINATION: NONE;OTHER (COMMENT)

## 2024-10-05 SDOH — HEALTH STABILITY: MENTAL HEALTH: FOR HIGH RISK PATIENTS: ALL INTERVENTIONS ABOVE, PLUS:;1:1 PATIENT OBSERVER AT ALL TIMES

## 2024-10-05 SDOH — HEALTH STABILITY: MENTAL HEALTH: WAS THIS WITHIN THE PAST THREE MONTHS?: YES

## 2024-10-05 ASSESSMENT — PAIN SCALES - GENERAL
PAINLEVEL_OUTOF10: 0 - NO PAIN

## 2024-10-05 NOTE — PROGRESS NOTES
"Pharmacy Medication History Review    Nathan Vaughn is a 39 y.o. male admitted for No Principal Problem: There is no principal problem currently on the Problem List.  Pharmacy reviewed the patient's [id number] medications and allergies for accuracy.    The list below reflects the updated PTA list.  Prior to Admission Medications   Prescriptions  Chart / Pharmacy Reported?   ARIPiprazole (Abilify) 10 mg tablet  Yes   Sig: Take 1 tablet (10 mg) by mouth once daily.  No recent fill history.   Last Fill (Dispense Report): 7/30/24, #30 / 30 day.    FLUoxetine (PROzac) 20 mg capsules  Yes   Sig: Take 2 capsules (40 mg) by mouth daily.   Last Fill (Dispense Report): 9/24/24, #60 / 30 day.    OLANZapine (ZyPREXA) 10 mg tablet  Yes   Sig: Take 1 tablet (10 mg) by mouth once daily at bedtime.  Last Fill (Dispense Report) 9/24/24, #30 / 30 day.    hydrOXYzine pamoate (Vistaril) 25 mg capsule  Yes   Sig: Take 1-2 mg by mouth 3 times a day as needed.  Last Fill (Dispense Report) 9/24/24, #180 / 30 day.       traZODone (Desyrel) 50 mg tablet  Yes   Sig: Take 1 tablet (50 mg) by mouth as needed at bedtime for sleep.  No recent fill history.   Last Fill (Dispense Report) 7/30/24, #30 / 30 day.               The list below reflects the updated allergy list. Please review each documented allergy for additional clarification and justification.  Allergies  Reviewed by Hayden Paniagua RN on 10/4/2024   No Known Allergies       Sources used to complete the med history include:  Epic Dispense Report (Pharmacy Fill History)  ED Psychiatry Note, 10/4/24 (VEL Manuel MD)  [facility] (no recent activity found)  CareEverywhere: VIN[facility] THOMAS, Visit 7/30/24    Additional Comments:  Medication review completed per chart review and pharmacy fill history. Last fill dates are provided in PTA Table above. Current encounter ED/Psychiatry documentation state patient took fluoxetine and olanzapine but \"stopped last week.\" Please see " detailed ED Psychiatry Note 10/4/24 (VEL Manuel MD) for further information and recommendations.     --------------------------------  Sony Jackson, Jessica, [facility]  Transitions of Care Pharmacist  Medication reconciliation complete  Please reach out via [facility] Secure Chat for questions,   or if no response call [employer] or [employer].  [facility]

## 2024-10-05 NOTE — PROGRESS NOTES
Patient was handed off to me from the previous team. For full history, physical, and prior ED course, please see previous provider note prior to patient handoff. This is an addendum to the record.    This is a 39 year old male who was a sign out to me pending repeat CK level.   He drank two Gatorades as well as 2L of LR with a repeat CK down trending from 2500 to 1500. I spoke with EPAT who said that the desired CK level for placement is below 1000. I spoke with Dr. Irizarry and we will encourage more PO and will repeat CK at 1600.     Hospital Course/MDM:  Please see the original ED provider note     Disposition:  Pending at this time but will be transferred to a psych facility     Kathy Friedman CNP  Emergency Medicine
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
## 2024-10-06 VITALS
DIASTOLIC BLOOD PRESSURE: 77 MMHG | TEMPERATURE: 97.8 F | SYSTOLIC BLOOD PRESSURE: 119 MMHG | BODY MASS INDEX: 25.48 KG/M2 | WEIGHT: 178 LBS | OXYGEN SATURATION: 100 % | HEART RATE: 78 BPM | RESPIRATION RATE: 18 BRPM | HEIGHT: 70 IN

## 2024-10-06 LAB
CK SERPL-CCNC: 1378 U/L (ref 0–325)
CK SERPL-CCNC: 906 U/L (ref 0–325)

## 2024-10-06 PROCEDURE — G0378 HOSPITAL OBSERVATION PER HR: HCPCS

## 2024-10-06 PROCEDURE — 96361 HYDRATE IV INFUSION ADD-ON: CPT

## 2024-10-06 PROCEDURE — 2500000004 HC RX 250 GENERAL PHARMACY W/ HCPCS (ALT 636 FOR OP/ED): Performed by: PHYSICIAN ASSISTANT

## 2024-10-06 PROCEDURE — 99213 OFFICE O/P EST LOW 20 MIN: CPT | Performed by: PSYCHIATRY & NEUROLOGY

## 2024-10-06 PROCEDURE — 96372 THER/PROPH/DIAG INJ SC/IM: CPT | Performed by: PHYSICIAN ASSISTANT

## 2024-10-06 PROCEDURE — 2500000004 HC RX 250 GENERAL PHARMACY W/ HCPCS (ALT 636 FOR OP/ED)

## 2024-10-06 PROCEDURE — 82550 ASSAY OF CK (CPK): CPT

## 2024-10-06 PROCEDURE — 36415 COLL VENOUS BLD VENIPUNCTURE: CPT

## 2024-10-06 RX ORDER — HALOPERIDOL 5 MG/ML
INJECTION INTRAMUSCULAR
Status: DISPENSED
Start: 2024-10-06 | End: 2024-10-07

## 2024-10-06 RX ORDER — MIDAZOLAM HYDROCHLORIDE 1 MG/ML
5 INJECTION INTRAMUSCULAR; INTRAVENOUS ONCE
Status: COMPLETED | OUTPATIENT
Start: 2024-10-06 | End: 2024-10-06

## 2024-10-06 RX ORDER — SODIUM CHLORIDE, SODIUM LACTATE, POTASSIUM CHLORIDE, CALCIUM CHLORIDE 600; 310; 30; 20 MG/100ML; MG/100ML; MG/100ML; MG/100ML
200 INJECTION, SOLUTION INTRAVENOUS CONTINUOUS
Status: ACTIVE | OUTPATIENT
Start: 2024-10-06 | End: 2024-10-06

## 2024-10-06 RX ORDER — HALOPERIDOL 5 MG/ML
5 INJECTION INTRAMUSCULAR ONCE
Status: COMPLETED | OUTPATIENT
Start: 2024-10-06 | End: 2024-10-06

## 2024-10-06 RX ORDER — MIDAZOLAM HYDROCHLORIDE 5 MG/ML
INJECTION, SOLUTION INTRAMUSCULAR; INTRAVENOUS
Status: DISPENSED
Start: 2024-10-06 | End: 2024-10-07

## 2024-10-06 RX ORDER — MIDAZOLAM HYDROCHLORIDE 1 MG/ML
5 INJECTION INTRAMUSCULAR; INTRAVENOUS ONCE
Status: DISCONTINUED | OUTPATIENT
Start: 2024-10-06 | End: 2024-10-06

## 2024-10-06 RX ORDER — HALOPERIDOL 5 MG/ML
5 INJECTION INTRAMUSCULAR ONCE
Status: DISCONTINUED | OUTPATIENT
Start: 2024-10-06 | End: 2024-10-06

## 2024-10-06 RX ADMIN — HALOPERIDOL LACTATE 5 MG: 5 INJECTION, SOLUTION INTRAMUSCULAR at 15:59

## 2024-10-06 RX ADMIN — SODIUM CHLORIDE, POTASSIUM CHLORIDE, SODIUM LACTATE AND CALCIUM CHLORIDE 1000 ML: 600; 310; 30; 20 INJECTION, SOLUTION INTRAVENOUS at 05:00

## 2024-10-06 RX ADMIN — SODIUM CHLORIDE, POTASSIUM CHLORIDE, SODIUM LACTATE AND CALCIUM CHLORIDE 200 ML/HR: 600; 310; 30; 20 INJECTION, SOLUTION INTRAVENOUS at 04:00

## 2024-10-06 RX ADMIN — MIDAZOLAM HYDROCHLORIDE 5 MG: 1 INJECTION, SOLUTION INTRAMUSCULAR; INTRAVENOUS at 15:59

## 2024-10-06 SDOH — HEALTH STABILITY: MENTAL HEALTH: BEHAVIORS/MOOD: COOPERATIVE;CALM

## 2024-10-06 SDOH — HEALTH STABILITY: MENTAL HEALTH: FOR HIGH RISK PATIENTS: 1:1 PATIENT OBSERVER AT ALL TIMES

## 2024-10-06 SDOH — HEALTH STABILITY: MENTAL HEALTH: CONTENT: BLAMING OTHERS;BLAMING SELF;DELUSIONS;PREOCCUPATION

## 2024-10-06 SDOH — HEALTH STABILITY: MENTAL HEALTH: HAVE YOU BEEN THINKING ABOUT HOW YOU MIGHT DO THIS?: YES

## 2024-10-06 SDOH — HEALTH STABILITY: MENTAL HEALTH: HAVE YOU ACTUALLY HAD ANY THOUGHTS OF KILLING YOURSELF?: YES

## 2024-10-06 SDOH — HEALTH STABILITY: MENTAL HEALTH: HAVE YOU EVER DONE ANYTHING, STARTED TO DO ANYTHING, OR PREPARED TO DO ANYTHING TO END YOUR LIFE?: YES

## 2024-10-06 SDOH — HEALTH STABILITY: MENTAL HEALTH: BEHAVIORAL HEALTH(WDL): EXCEPTIONS TO WDL

## 2024-10-06 SDOH — HEALTH STABILITY: MENTAL HEALTH: SLEEP PATTERN: UNABLE TO ASSESS

## 2024-10-06 SDOH — HEALTH STABILITY: MENTAL HEALTH: RISK OF SUICIDE: HIGH RISK

## 2024-10-06 SDOH — HEALTH STABILITY: MENTAL HEALTH
OTHER SUICIDE PRECAUTIONS INCLUDE: PATIENT PLACED IN AN EASILY OBSERVABLE ROOM WITH DOOR/CURTAIN REMAINING OPEN;PATIENT PLACED IN GOWN (SNAPS OR PAPER GOWNS PREFERRED) AND WANDED;REMAINING RISKS IDENTIFIED AND MITIGATED;PATIENT PLACED IN PSYCH SAFE ROOM (IF AVAILABLE);PROVIDER NOTIFIED;EL

## 2024-10-06 SDOH — HEALTH STABILITY: MENTAL HEALTH: HAVE YOU WISHED YOU WERE DEAD OR WISHED YOU COULD GO TO SLEEP AND NOT WAKE UP?: YES

## 2024-10-06 SDOH — HEALTH STABILITY: MENTAL HEALTH: SUICIDE ASSESSMENT: ADULT (C-SSRS)

## 2024-10-06 SDOH — HEALTH STABILITY: MENTAL HEALTH: WAS THIS WITHIN THE PAST THREE MONTHS?: YES

## 2024-10-06 SDOH — HEALTH STABILITY: MENTAL HEALTH: HAVE YOU HAD THESE THOUGHTS AND HAD SOME INTENTION OF ACTING ON THEM?: YES

## 2024-10-06 SDOH — HEALTH STABILITY: MENTAL HEALTH: DELUSIONS: PARANOID
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
## 2024-10-07 VITALS
HEART RATE: 71 BPM | BODY MASS INDEX: 25.48 KG/M2 | RESPIRATION RATE: 20 BRPM | SYSTOLIC BLOOD PRESSURE: 124 MMHG | DIASTOLIC BLOOD PRESSURE: 81 MMHG | OXYGEN SATURATION: 97 % | TEMPERATURE: 98.4 F | WEIGHT: 178 LBS | HEIGHT: 70 IN

## 2024-10-07 PROCEDURE — 2500000002 HC RX 250 W HCPCS SELF ADMINISTERED DRUGS (ALT 637 FOR MEDICARE OP, ALT 636 FOR OP/ED): Mod: MUE

## 2024-10-07 PROCEDURE — G0378 HOSPITAL OBSERVATION PER HR: HCPCS

## 2024-10-07 RX ADMIN — OLANZAPINE 5 MG: 5 TABLET, FILM COATED ORAL at 09:08

## 2024-10-07 ASSESSMENT — PAIN - FUNCTIONAL ASSESSMENT: PAIN_FUNCTIONAL_ASSESSMENT: 0-10

## 2024-10-07 NOTE — SIGNIFICANT EVENT
Application for Emergency Admission      Ready for Transfer?  Is the patient medically cleared for transfer to inpatient psychiatry: Yes  Has the patient been accepted to an inpatient psychiatric hospital: Yes    Application for Emergency Admission  IN ACCORDANCE WITH [place]122[place]10 [place]  The Chief Clinical Officer of: [place] [facility] 10/7/2024 .3:00 AM    Reason for Hospitalization  The undersigned has reason to believe that: Nathan Vaughn Is a mentally ill person subject to hospitalization by court order under division B Section 5122.01 of the Revised Code, i.e., this person:    1.Yes  Represents a substantial risk of physical harm to self as manifested by evidence of threats of, or attempts at, suicide or serious self-inflicted bodily harm    2.Yes Represents a substantial risk of physical harm to others as manifested by evidence of recent homicidal or other violent behavior, evidence of recent threats that place another in reasonable fear of violent behavior and serious physical harm, or other evidence of present dangerousness    3.Yes Represents a substantial and immediate risk of serious physical impairment or injury to self as manifested by  evidence that the person is unable to provide for and is not providing for the person's basic physical needs because of the person's mental illness and that appropriate provision for those needs cannot be made  immediately available in the community    4.Yes Would benefit from treatment in a hospital for his mental illness and is in need of such treatment as manifested by evidence of behavior that creates a grave and imminent risk to substantial rights of others or  himself.    5.Yes Would benefit from treatment as manifested by evidence of behavior that indicates all of the following:       (a) The person is unlikely to survive safely in the community without supervision, based on a clinical determination.       (b) The person has a history of lack of compliance with  treatment for mental illness and one of the following applies:      (i) At least twice within the thirty-six months prior to the filing of an affidavit seeking court-ordered treatment of the person under section 5122.111 of the Revised Code, the lack of compliance has been a significant factor in necessitating hospitalization in a hospital or receipt of services in a forensic or other mental health unit of a correctional facility, provided that the thirty-six-month period shall be extended by the length of any hospitalization or incarceration of the person that occurred within the thirty-six-month period.      (ii) Within the forty-eight months prior to the filing of an affidavit seeking court-ordered treatment of the person under section 5122.111 of the Revised Code, the lack of compliance resulted in one or more acts of serious violent behavior toward self or others or threats of, or attempts at, serious physical harm to self or others, provided that the forty-eight-month period shall be extended by the length of any hospitalization or incarceration of the person that occurred within the forty-eight-month period.      (c) The person, as a result of mental illness, is unlikely to voluntarily participate in necessary treatment.       (d) In view of the person's treatment history and current behavior, the person is in need of treatment in order to prevent a relapse or deterioration that would be likely to result in substantial risk of serious harm to the person or others.    (e) Represents a substantial risk of physical harm to self or others if allowed to remain at liberty pending examination.    Therefore, it is requested that said person be admitted to the above named facility.    STATEMENT OF BELIEF    Must be filled out by one of the following: a psychiatrist, licensed physician, licensed clinical psychologist, health or ,  or .  (Statement shall include the circumstances under  which the individual was taken into custody and the reason for the person's belief that hospitalization is necessary. The statement shall also include a reference to efforts made to secure the individual's property at his residence if he was taken into custody there. Every reasonable and appropriate effort should be made to take this person into custody in the least conspicuous manner possible.)    The patient has extensive psychiatric history and presented with decompensated mood disorder, exhibiting vague SI, HI, and psychosis. Would benefit from inpatient psychiatric admission.      Allison Rice MD 10/7/2024     _____________________________________________________________   Place of Employment: [facility]    STATEMENT OF OBSERVATION BY PSYCHIATRIST, LICENSED PHYSICIAN, OR LICENSED CLINICAL PSYCHOLOGIST, IF APPLICABLE    Place of Observation (e.g., [facility] mental [facility] center, general hospital, office, emergency facility)  (If applicable, please complete)    Allison Rice MD 10/7/2024    _____________________________________________________________
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
## 2024-11-03 ENCOUNTER — HOSPITAL ENCOUNTER (OUTPATIENT)
Facility: HOSPITAL | Age: 39
Setting detail: OBSERVATION
Discharge: OTHER INSTITUTION | End: 2024-11-04
Attending: EMERGENCY MEDICINE | Admitting: EMERGENCY MEDICINE
Payer: MEDICARE

## 2024-11-03 DIAGNOSIS — F29 PSYCHOSIS, UNSPECIFIED PSYCHOSIS TYPE (MULTI): Primary | ICD-10-CM

## 2024-11-03 LAB
ALBUMIN SERPL BCP-MCNC: 4.8 G/DL (ref 3.4–5)
ALP SERPL-CCNC: 84 U/L (ref 33–120)
ALT SERPL W P-5'-P-CCNC: 28 U/L (ref 10–52)
ANION GAP SERPL CALC-SCNC: 13 MMOL/L (ref 10–20)
APAP SERPL-MCNC: <10 UG/ML
AST SERPL W P-5'-P-CCNC: 39 U/L (ref 9–39)
ATRIAL RATE: 95 BPM
BASOPHILS # BLD AUTO: 0.03 X10*3/UL (ref 0–0.1)
BASOPHILS NFR BLD AUTO: 0.3 %
BILIRUB SERPL-MCNC: 1.4 MG/DL (ref 0–1.2)
BUN SERPL-MCNC: 18 MG/DL (ref 6–23)
CALCIUM SERPL-MCNC: 9.7 MG/DL (ref 8.6–10.6)
CHLORIDE SERPL-SCNC: 102 MMOL/L (ref 98–107)
CO2 SERPL-SCNC: 25 MMOL/L (ref 21–32)
CREAT SERPL-MCNC: 1.08 MG/DL (ref 0.5–1.3)
EGFRCR SERPLBLD CKD-EPI 2021: >90 ML/MIN/1.73M*2
EOSINOPHIL # BLD AUTO: 0.05 X10*3/UL (ref 0–0.7)
EOSINOPHIL NFR BLD AUTO: 0.5 %
ERYTHROCYTE [DISTWIDTH] IN BLOOD BY AUTOMATED COUNT: 13.2 % (ref 11.5–14.5)
ETHANOL SERPL-MCNC: <10 MG/DL
GLUCOSE SERPL-MCNC: 89 MG/DL (ref 74–99)
HCT VFR BLD AUTO: 38.1 % (ref 41–52)
HGB BLD-MCNC: 13.5 G/DL (ref 13.5–17.5)
HIV 1+2 AB+HIV1 P24 AG SERPL QL IA: NONREACTIVE
IMM GRANULOCYTES # BLD AUTO: 0.02 X10*3/UL (ref 0–0.7)
IMM GRANULOCYTES NFR BLD AUTO: 0.2 % (ref 0–0.9)
LYMPHOCYTES # BLD AUTO: 1.8 X10*3/UL (ref 1.2–4.8)
LYMPHOCYTES NFR BLD AUTO: 19.6 %
MCH RBC QN AUTO: 27.8 PG (ref 26–34)
MCHC RBC AUTO-ENTMCNC: 35.4 G/DL (ref 32–36)
MCV RBC AUTO: 79 FL (ref 80–100)
MONOCYTES # BLD AUTO: 0.87 X10*3/UL (ref 0.1–1)
MONOCYTES NFR BLD AUTO: 9.5 %
NEUTROPHILS # BLD AUTO: 6.41 X10*3/UL (ref 1.2–7.7)
NEUTROPHILS NFR BLD AUTO: 69.9 %
NRBC BLD-RTO: 0 /100 WBCS (ref 0–0)
P AXIS: 53 DEGREES
P OFFSET: 211 MS
P ONSET: 153 MS
PLATELET # BLD AUTO: 375 X10*3/UL (ref 150–450)
POTASSIUM SERPL-SCNC: 3.6 MMOL/L (ref 3.5–5.3)
PR INTERVAL: 134 MS
PROT SERPL-MCNC: 8.2 G/DL (ref 6.4–8.2)
Q ONSET: 220 MS
QRS COUNT: 16 BEATS
QRS DURATION: 82 MS
QT INTERVAL: 380 MS
QTC CALCULATION(BAZETT): 477 MS
QTC FREDERICIA: 442 MS
R AXIS: 69 DEGREES
RBC # BLD AUTO: 4.85 X10*6/UL (ref 4.5–5.9)
SALICYLATES SERPL-MCNC: <3 MG/DL
SODIUM SERPL-SCNC: 136 MMOL/L (ref 136–145)
T AXIS: 64 DEGREES
T OFFSET: 410 MS
TSH SERPL-ACNC: 1.56 MIU/L (ref 0.44–3.98)
VENTRICULAR RATE: 95 BPM
WBC # BLD AUTO: 9.2 X10*3/UL (ref 4.4–11.3)

## 2024-11-03 PROCEDURE — 96372 THER/PROPH/DIAG INJ SC/IM: CPT | Performed by: PHYSICIAN ASSISTANT

## 2024-11-03 PROCEDURE — G0378 HOSPITAL OBSERVATION PER HR: HCPCS

## 2024-11-03 PROCEDURE — 2500000004 HC RX 250 GENERAL PHARMACY W/ HCPCS (ALT 636 FOR OP/ED): Mod: SE

## 2024-11-03 PROCEDURE — 85025 COMPLETE CBC W/AUTO DIFF WBC: CPT | Performed by: PHYSICIAN ASSISTANT

## 2024-11-03 PROCEDURE — 80143 DRUG ASSAY ACETAMINOPHEN: CPT | Performed by: PHYSICIAN ASSISTANT

## 2024-11-03 PROCEDURE — 96372 THER/PROPH/DIAG INJ SC/IM: CPT

## 2024-11-03 PROCEDURE — 36415 COLL VENOUS BLD VENIPUNCTURE: CPT | Performed by: PHYSICIAN ASSISTANT

## 2024-11-03 PROCEDURE — 84443 ASSAY THYROID STIM HORMONE: CPT | Performed by: PHYSICIAN ASSISTANT

## 2024-11-03 PROCEDURE — 99285 EMERGENCY DEPT VISIT HI MDM: CPT | Performed by: PHYSICIAN ASSISTANT

## 2024-11-03 PROCEDURE — 93010 ELECTROCARDIOGRAM REPORT: CPT | Performed by: PHYSICIAN ASSISTANT

## 2024-11-03 PROCEDURE — 2500000004 HC RX 250 GENERAL PHARMACY W/ HCPCS (ALT 636 FOR OP/ED): Mod: JG,SE

## 2024-11-03 PROCEDURE — 86780 TREPONEMA PALLIDUM: CPT | Performed by: EMERGENCY MEDICINE

## 2024-11-03 PROCEDURE — 36415 COLL VENOUS BLD VENIPUNCTURE: CPT | Performed by: EMERGENCY MEDICINE

## 2024-11-03 PROCEDURE — 2500000004 HC RX 250 GENERAL PHARMACY W/ HCPCS (ALT 636 FOR OP/ED): Mod: JZ,JG,SE | Performed by: PHYSICIAN ASSISTANT

## 2024-11-03 PROCEDURE — 99285 EMERGENCY DEPT VISIT HI MDM: CPT

## 2024-11-03 PROCEDURE — 82550 ASSAY OF CK (CPK): CPT | Performed by: EMERGENCY MEDICINE

## 2024-11-03 PROCEDURE — 84075 ASSAY ALKALINE PHOSPHATASE: CPT | Performed by: PHYSICIAN ASSISTANT

## 2024-11-03 PROCEDURE — 87389 HIV-1 AG W/HIV-1&-2 AB AG IA: CPT | Performed by: EMERGENCY MEDICINE

## 2024-11-03 RX ORDER — OLANZAPINE 10 MG/2ML
INJECTION, POWDER, FOR SOLUTION INTRAMUSCULAR
Status: COMPLETED
Start: 2024-11-03 | End: 2024-11-03

## 2024-11-03 RX ORDER — OLANZAPINE 10 MG/2ML
5 INJECTION, POWDER, FOR SOLUTION INTRAMUSCULAR EVERY 6 HOURS PRN
Status: DISCONTINUED | OUTPATIENT
Start: 2024-11-03 | End: 2024-11-04 | Stop reason: HOSPADM

## 2024-11-03 RX ORDER — OLANZAPINE 5 MG/1
5 TABLET, ORALLY DISINTEGRATING ORAL EVERY 6 HOURS PRN
Status: DISCONTINUED | OUTPATIENT
Start: 2024-11-03 | End: 2024-11-04 | Stop reason: HOSPADM

## 2024-11-03 RX ORDER — OLANZAPINE 10 MG/2ML
5 INJECTION, POWDER, FOR SOLUTION INTRAMUSCULAR EVERY 6 HOURS PRN
Status: DISCONTINUED | OUTPATIENT
Start: 2024-11-03 | End: 2024-11-03

## 2024-11-03 RX ORDER — CEFTRIAXONE 500 MG/1
500 INJECTION, POWDER, FOR SOLUTION INTRAMUSCULAR; INTRAVENOUS ONCE
Status: DISCONTINUED | OUTPATIENT
Start: 2024-11-03 | End: 2024-11-04 | Stop reason: HOSPADM

## 2024-11-03 RX ORDER — FLUOXETINE HYDROCHLORIDE 20 MG/1
40 CAPSULE ORAL DAILY
Status: DISCONTINUED | OUTPATIENT
Start: 2024-11-04 | End: 2024-11-04 | Stop reason: HOSPADM

## 2024-11-03 RX ORDER — OLANZAPINE 5 MG/1
10 TABLET ORAL NIGHTLY
Status: DISCONTINUED | OUTPATIENT
Start: 2024-11-03 | End: 2024-11-04 | Stop reason: HOSPADM

## 2024-11-03 RX ORDER — FLUOXETINE HYDROCHLORIDE 20 MG/1
40 CAPSULE ORAL DAILY
COMMUNITY

## 2024-11-03 RX ORDER — LORAZEPAM 0.5 MG/1
0.5 TABLET ORAL ONCE
Status: DISCONTINUED | OUTPATIENT
Start: 2024-11-03 | End: 2024-11-03

## 2024-11-03 RX ORDER — AZITHROMYCIN 500 MG/1
1000 TABLET, FILM COATED ORAL ONCE
Status: DISCONTINUED | OUTPATIENT
Start: 2024-11-03 | End: 2024-11-04 | Stop reason: HOSPADM

## 2024-11-03 RX ORDER — LORAZEPAM 2 MG/ML
0.5 INJECTION INTRAMUSCULAR ONCE
Status: COMPLETED | OUTPATIENT
Start: 2024-11-03 | End: 2024-11-03

## 2024-11-03 RX ORDER — OLANZAPINE 10 MG/2ML
10 INJECTION, POWDER, FOR SOLUTION INTRAMUSCULAR ONCE
Status: COMPLETED | OUTPATIENT
Start: 2024-11-03 | End: 2024-11-03

## 2024-11-03 RX ORDER — OLANZAPINE 10 MG/1
10 TABLET ORAL NIGHTLY
COMMUNITY

## 2024-11-03 SDOH — HEALTH STABILITY: MENTAL HEALTH: HALLUCINATION: AUDITORY

## 2024-11-03 SDOH — HEALTH STABILITY: MENTAL HEALTH: SLEEP PATTERN: UNABLE TO ASSESS

## 2024-11-03 SDOH — HEALTH STABILITY: MENTAL HEALTH: BEHAVIORAL HEALTH(WDL): EXCEPTIONS TO WDL

## 2024-11-03 SDOH — HEALTH STABILITY: MENTAL HEALTH: BEHAVIORS/MOOD: APPROPRIATE FOR SITUATION;FLIGHT OF IDEAS;HYPER-VERBAL;PACING;RESTLESS;DELUSIONS

## 2024-11-03 SDOH — HEALTH STABILITY: MENTAL HEALTH: BEHAVIORS/MOOD: AGITATED

## 2024-11-03 SDOH — SOCIAL STABILITY: SOCIAL NETWORK: EMOTIONAL SUPPORT GIVEN: PATIENT COUNSELING

## 2024-11-03 SDOH — HEALTH STABILITY: MENTAL HEALTH: NEEDS EXPRESSED: PHYSICAL;EMOTIONAL

## 2024-11-03 SDOH — HEALTH STABILITY: MENTAL HEALTH: CONTENT: DELUSIONS

## 2024-11-03 SDOH — HEALTH STABILITY: MENTAL HEALTH
OTHER SUICIDE PRECAUTIONS INCLUDE: PATIENT PLACED IN GOWN (SNAPS OR PAPER GOWNS PREFERRED) AND WANDED;REMAINING RISKS IDENTIFIED AND MITIGATED;PATIENT PLACED IN PSYCH SAFE ROOM (IF AVAILABLE);ELOPEMENT RISK IDENTIFIED

## 2024-11-03 SDOH — SOCIAL STABILITY: SOCIAL INSECURITY: FAMILY BEHAVIORS: UNABLE TO ASSESS

## 2024-11-03 SDOH — HEALTH STABILITY: MENTAL HEALTH: DELUSIONS: PARANOID

## 2024-11-03 SDOH — SOCIAL STABILITY: SOCIAL NETWORK: PARENT/GUARDIAN/SIGNIFICANT OTHER INVOLVEMENT: NO INVOLVEMENT

## 2024-11-03 SDOH — SOCIAL STABILITY: SOCIAL NETWORK: VISITOR BEHAVIORS: UNABLE TO ASSESS

## 2024-11-03 ASSESSMENT — PAIN SCALES - GENERAL
PAINLEVEL_OUTOF10: 3
PAINLEVEL_OUTOF10: 0 - NO PAIN

## 2024-11-03 ASSESSMENT — LIFESTYLE VARIABLES
HAVE PEOPLE ANNOYED YOU BY CRITICIZING YOUR DRINKING: NO
EVER HAD A DRINK FIRST THING IN THE MORNING TO STEADY YOUR NERVES TO GET RID OF A HANGOVER: NO
EVER FELT BAD OR GUILTY ABOUT YOUR DRINKING: NO
TOTAL SCORE: 0
HAVE YOU EVER FELT YOU SHOULD CUT DOWN ON YOUR DRINKING: NO

## 2024-11-03 ASSESSMENT — PAIN - FUNCTIONAL ASSESSMENT: PAIN_FUNCTIONAL_ASSESSMENT: 0-10

## 2024-11-03 ASSESSMENT — COLUMBIA-SUICIDE SEVERITY RATING SCALE - C-SSRS
1. IN THE PAST MONTH, HAVE YOU WISHED YOU WERE DEAD OR WISHED YOU COULD GO TO SLEEP AND NOT WAKE UP?: YES
6. HAVE YOU EVER DONE ANYTHING, STARTED TO DO ANYTHING, OR PREPARED TO DO ANYTHING TO END YOUR LIFE?: NO
2. HAVE YOU ACTUALLY HAD ANY THOUGHTS OF KILLING YOURSELF?: NO

## 2024-11-03 NOTE — ED TRIAGE NOTES
Patient BIB EMS from bus terminal after calling on himself. Per EMS patient just got out of Rehab and was sitting on the curb. Patient appears manic with rapid speech and nonsensical conversation.
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
## 2024-11-04 VITALS
HEIGHT: 70 IN | SYSTOLIC BLOOD PRESSURE: 106 MMHG | HEART RATE: 98 BPM | DIASTOLIC BLOOD PRESSURE: 70 MMHG | WEIGHT: 178 LBS | RESPIRATION RATE: 18 BRPM | TEMPERATURE: 97.3 F | OXYGEN SATURATION: 96 % | BODY MASS INDEX: 25.48 KG/M2

## 2024-11-04 LAB
AMPHETAMINES UR QL SCN: ABNORMAL
APPEARANCE UR: CLEAR
BARBITURATES UR QL SCN: ABNORMAL
BENZODIAZ UR QL SCN: ABNORMAL
BILIRUB UR STRIP.AUTO-MCNC: NEGATIVE MG/DL
BZE UR QL SCN: ABNORMAL
C TRACH RRNA SPEC QL NAA+PROBE: NEGATIVE
CANNABINOIDS UR QL SCN: ABNORMAL
CK SERPL-CCNC: 1623 U/L (ref 0–325)
COLOR UR: YELLOW
FENTANYL+NORFENTANYL UR QL SCN: ABNORMAL
GLUCOSE UR STRIP.AUTO-MCNC: NORMAL MG/DL
HOLD SPECIMEN: NORMAL
KETONES UR STRIP.AUTO-MCNC: NEGATIVE MG/DL
LEUKOCYTE ESTERASE UR QL STRIP.AUTO: NEGATIVE
METHADONE UR QL SCN: ABNORMAL
MUCOUS THREADS #/AREA URNS AUTO: ABNORMAL /LPF
N GONORRHOEA DNA SPEC QL PROBE+SIG AMP: NEGATIVE
NITRITE UR QL STRIP.AUTO: NEGATIVE
OPIATES UR QL SCN: ABNORMAL
OXYCODONE+OXYMORPHONE UR QL SCN: ABNORMAL
PCP UR QL SCN: ABNORMAL
PH UR STRIP.AUTO: 6 [PH]
PROT UR STRIP.AUTO-MCNC: ABNORMAL MG/DL
RBC # UR STRIP.AUTO: ABNORMAL /UL
RBC #/AREA URNS AUTO: ABNORMAL /HPF
SP GR UR STRIP.AUTO: 1.04
T VAGINALIS RRNA SPEC QL NAA+PROBE: NEGATIVE
TREPONEMA PALLIDUM IGG+IGM AB [PRESENCE] IN SERUM OR PLASMA BY IMMUNOASSAY: NONREACTIVE
UROBILINOGEN UR STRIP.AUTO-MCNC: ABNORMAL MG/DL
WBC #/AREA URNS AUTO: ABNORMAL /HPF

## 2024-11-04 PROCEDURE — 2500000001 HC RX 250 WO HCPCS SELF ADMINISTERED DRUGS (ALT 637 FOR MEDICARE OP): Mod: SE

## 2024-11-04 PROCEDURE — 87491 CHLMYD TRACH DNA AMP PROBE: CPT | Performed by: PHYSICIAN ASSISTANT

## 2024-11-04 PROCEDURE — 80307 DRUG TEST PRSMV CHEM ANLYZR: CPT | Performed by: PHYSICIAN ASSISTANT

## 2024-11-04 PROCEDURE — 2500000002 HC RX 250 W HCPCS SELF ADMINISTERED DRUGS (ALT 637 FOR MEDICARE OP, ALT 636 FOR OP/ED): Mod: SE | Performed by: PHYSICIAN ASSISTANT

## 2024-11-04 PROCEDURE — G0378 HOSPITAL OBSERVATION PER HR: HCPCS

## 2024-11-04 PROCEDURE — 81001 URINALYSIS AUTO W/SCOPE: CPT | Performed by: PHYSICIAN ASSISTANT

## 2024-11-04 PROCEDURE — 87661 TRICHOMONAS VAGINALIS AMPLIF: CPT | Performed by: PHYSICIAN ASSISTANT

## 2024-11-04 RX ORDER — LORAZEPAM 0.5 MG/1
0.5 TABLET ORAL ONCE
Status: COMPLETED | OUTPATIENT
Start: 2024-11-04 | End: 2024-11-04

## 2024-11-04 RX ORDER — LORAZEPAM 1 MG/1
TABLET ORAL
Status: COMPLETED
Start: 2024-11-04 | End: 2024-11-04

## 2024-11-04 NOTE — PROGRESS NOTES
"Observation History and Physical  [facility] EMERGENCY MEDICINE           History of Present Illness     History provided by: Patient  Limitations to History:  psychosis  External Records Reviewed: Psych notes from EMR      Patient History:  Nathan Vaughn is a 39 y.o. male who presented to the emergency department acute psychosis versus methamphetamine intoxication    Nathan Vaughn was escalated to observation status. Observation was necessary as they continue to require treatment and monitoring of their psychiatric illness while awaiting inpatient behavioral health bed availability.    Physical Exam     Visit Vitals  /83   Pulse 95   Temp 36.3 °C (97.3 °F)   Resp 20   Ht 1.778 m (5' 10\")   Wt 80.7 kg (178 lb)   SpO2 (!) 92%   BMI 25.54 kg/m²   Smoking Status Every Day   BSA 2 m²       GENERAL:  The patient appears nourished and normally developed. Vital signs as documented.     PULMONARY:  Without any respiratory distress. Able to speak full sentences, no accessory muscle use    CARDIAC: Warm and well perfused. No cyanosis.    MUSCULOSKELETAL:   Able to ambulate, Non edematous, with no obvious deformities.     SKIN: No pallor. Intact.    NEURO:  No obvious neurological deficits.  Able to follow commands.    Psych: Very tangential, appearing until exterminated, occasionally lashing out however redirectable      Impression and Plan     ED Course as of 11/03/24 1913   Sun Nov 03, 2024   1115 Laboratory work is unremarkable with no leukocytosis or anemia, normal electrolytes, negative alcohol.  Urine still pending. [PIERO]   1826 Electrocardiogram, 12-lead  ED EKG interpretation:  Sinus rhythm rate of 95, normal axis, normal intervals with QTc 477, no ST segment change [PIERO]   1827 Patient was evaluated by EPAT who recommended inpatient psychiatric hospitalization.  At the end of my shift awaiting placement, signout given to [place] resident. [PIERO]      ED Course User Index  [PIERO] Foster ARRINGTON " TALON Johns         Diagnoses as of 11/03/24 1913   Psychosis, unspecified psychosis type (Multi)        Nathan Vaughn under observation status in [facility] EMERGENCY MEDICINE for psychiatric illness monitoring and treatment while awaiting inpatient behavioral health bed availability.   Emergency Psychiatric Assessment Team has been consulted. Case discussed with them and decision for inpatient hospitalization deemed necessary. Patient is medically clear at this time.     Home medication reconciliation was reviewed and restarted where clinically indicated.     Patient and Family updated on plan of care.     Foster Johns PA-C

## 2024-11-04 NOTE — PROGRESS NOTES
"Observation Disposition Note  [facility] EMERGENCY MEDICINE             Subjective:       Nathan Vaughn has been under observation status in [facility] EMERGENCY MEDICINE for psychiatric illness monitoring and treatment while awaiting inpatient behavioral health bed availability.       Physical Exam     Visit Vitals  /70   Pulse 98   Temp 36.3 °C (97.3 °F)   Resp 18   Ht 1.778 m (5' 10\")   Wt 80.7 kg (178 lb)   SpO2 96%   BMI 25.54 kg/m²   Smoking Status Every Day   BSA 2 m²       GENERAL:  The patient appears nourished and normally developed. Vital signs as documented.     PULMONARY:  Without any respiratory distress. Able to speak full sentences, no accessory muscle use    CARDIAC: Warm and well perfused. No cyanosis.    MUSCULOSKELETAL:   Able to ambulate, Non edematous, with no obvious deformities.     SKIN: No pallor. Intact.    NEURO:  No obvious neurological deficits.  Able to follow commands.    Psych: tangential thoughts      Impresssion and Plan     In summary, Nathan Vaughn has been cared under observation in [facility] for psychiatric illness monitoring and treatment while awaiting inpatient behavioral health bed availability.     Emergency Psychiatric Assessment Team was consulted. Case discussed with them and decision for inpatient hospitalization deemed necessary.     Patient has been accepted at [facility]    Total length of observation was 27 hours. Dr. Jered Naranjo MD is the disposition attending.    Discharge Diagnosis  Suicidal ideation    Kassie Alegria DO  "

## 2024-11-04 NOTE — SIGNIFICANT EVENT
Application for Emergency Admission      Ready for Transfer?  Is the patient medically cleared for transfer to inpatient psychiatry: Yes  Has the patient been accepted to an inpatient psychiatric hospital: Yes    Application for Emergency Admission  IN ACCORDANCE WITH SECTION 5122.10 [place]  The Chief Clinical Officer of: Elizabeth Rasmussen 11/4/2024 .8:38 AM    Reason for Hospitalization  The undersigned has reason to believe that: Nathan Vaughn Is a mentally ill person subject to hospitalization by court order under division B Section 5122.01 of the Revised Code, i.e., this person:    1.Yes  Represents a substantial risk of physical harm to self as manifested by evidence of threats of, or attempts at, suicide or serious self-inflicted bodily harm    2.Yes Represents a substantial risk of physical harm to others as manifested by evidence of recent homicidal or other violent behavior, evidence of recent threats that place another in reasonable fear of violent behavior and serious physical harm, or other evidence of present dangerousness    3.Yes Represents a substantial and immediate risk of serious physical impairment or injury to self as manifested by  evidence that the person is unable to provide for and is not providing for the person's basic physical needs because of the person's mental illness and that appropriate provision for those needs cannot be made  immediately available in the community    4.Yes Would benefit from treatment in a hospital for his mental illness and is in need of such treatment as manifested by evidence of behavior that creates a grave and imminent risk to substantial rights of others or  himself.    5.Yes Would benefit from treatment as manifested by evidence of behavior that indicates all of the following:       (a) The person is unlikely to survive safely in the community without supervision, based on a clinical determination.       (b) The person has a history of lack of compliance  with treatment for mental illness and one of the following applies:      (i) At least twice within the thirty-six months prior to the filing of an affidavit seeking court-ordered treatment of the person under section 5122.111 of the Revised Code, the lack of compliance has been a significant factor in necessitating hospitalization in a hospital or receipt of services in a forensic or other mental health unit of a correctional facility, provided that the thirty-six-month period shall be extended by the length of any hospitalization or incarceration of the person that occurred within the thirty-six-month period.      (ii) Within the forty-eight months prior to the filing of an affidavit seeking court-ordered treatment of the person under section 5122.111 of the Revised Code, the lack of compliance resulted in one or more acts of serious violent behavior toward self or others or threats of, or attempts at, serious physical harm to self or others, provided that the forty-eight-month period shall be extended by the length of any hospitalization or incarceration of the person that occurred within the forty-eight-month period.      (c) The person, as a result of mental illness, is unlikely to voluntarily participate in necessary treatment.       (d) In view of the person's treatment history and current behavior, the person is in need of treatment in order to prevent a relapse or deterioration that would be likely to result in substantial risk of serious harm to the person or others.    (e) Represents a substantial risk of physical harm to self or others if allowed to remain at liberty pending examination.    Therefore, it is requested that said person be admitted to the above named facility.    STATEMENT OF BELIEF    Must be filled out by one of the following: a psychiatrist, licensed physician, licensed clinical psychologist, health or ,  or .  (Statement shall include the circumstances  under which the individual was taken into custody and the reason for the person's belief that hospitalization is necessary. The statement shall also include a reference to efforts made to secure the individual's property at his residence if he was taken into custody there. Every reasonable and appropriate effort should be made to take this person into custody in the least conspicuous manner possible.)    On initial assessment, patient was somnolent, disorganized and tangential in thought, and actively stating suicidal ideation with a plan, and command auditory hallucinations. Though his clinical picture is likely altered by recent substance-use, given his escalating substance use, multiple psychiatric admissions and ER visits, stated psychotic sxs, lack of social support, and hx of medication noncompliance, patient does meet inpatient criteria for psychiatric admission at this time.      Kassie Alegria DO 11/4/2024     _____________________________________________________________   Place of Employment: [facility]    STATEMENT OF OBSERVATION BY PSYCHIATRIST, LICENSED PHYSICIAN, OR LICENSED CLINICAL PSYCHOLOGIST, IF APPLICABLE    Place of Observation (e.g., [facility] center, general hospital, office, emergency facility)  (If applicable, please complete)    Kassie Alegria DO 11/4/2024    _____________________________________________________________

## 2024-11-04 NOTE — PROGRESS NOTES
"Observation Progress Note  [facility] EMERGENCY MEDICINE           Nathan Vaughn is a 39 y.o. male on day 0 of observation awaiting psychiatric bed availability and admission for acute psychosis.        Physical Exam     GENERAL:  The patient appears nourished and normally developed. Vital signs as documented.     PULMONARY:  Without any respiratory distress. Able to speak full sentences, no accessory muscle use    CARDIAC: Warm and well perfused. No cyanosis.    MUSCULOSKELETAL:   Able to ambulate, Non edematous, with no obvious deformities.     SKIN: No pallor. Intact.    NEURO:  No obvious neurological deficits.  Able to follow commands.    Psych: Tangential, intermittently agitated however overall redirectable      Last Recorded Vitals  Blood pressure 103/67, pulse 87, temperature 36.3 °C (97.3 °F), resp. rate 16, height 1.778 m (5' 10\"), weight 80.7 kg (178 lb), SpO2 96%.  Intake/Output last 3 Shifts:  No intake/output data recorded.    Scheduled medications  azithromycin, 1,000 mg, oral, Once  cefTRIAXone, 500 mg, intramuscular, Once  FLUoxetine, 40 mg, oral, Daily  OLANZapine, 10 mg, oral, Nightly      Continuous medications     PRN medications  PRN medications: OLANZapine, OLANZapine zydis    Impression and Plan     ED Course as of 11/04/24 0040   Sun Nov 03, 2024   1115 Laboratory work is unremarkable with no leukocytosis or anemia, normal electrolytes, negative alcohol.  Urine still pending. [PIERO]   1826 Electrocardiogram, 12-lead  ED EKG interpretation:  Sinus rhythm rate of 95, normal axis, normal intervals with QTc 477, no ST segment change [PIERO]   1827 Patient was evaluated by EPAT who recommended inpatient psychiatric hospitalization.  At the end of my shift awaiting placement, signout given to [facility] pod resident. [PIERO]      ED Course User Index  [PIERO] Foster Johns PA-C         Diagnoses as of 11/04/24 0040   Psychosis, unspecified psychosis type (Multi)       Nathan Vaughn " remains under observation status in [facility] EMERGENCY MEDICINE for psychiatric illness monitoring and treatment while awaiting inpatient behavioral health bed availability.     Psychiatric consult service recommendations reviewed, case discussed with them and decision for inpatient hospitalization continues to be necessary.     Patient and Family updated on plan of care.     Swati Deleon, DO

## 2024-11-12 NOTE — PROGRESS NOTES
Pharmacy Medication History Review    Nathan Vaughn is a 39 y.o. male admitted for No Principal Problem: There is no principal problem currently on the Problem List. Please update the Problem List and refresh.. Pharmacy reviewed the patient's [id number] medications and allergies for accuracy.    Medications ADDED:  Olanzapine 10 mg   Fluoxetine 40 mg   Medications CHANGED:  None   Medications REMOVED:   Trazodone 50 mg nightly - filled once in July 2024 for 30 days. No refills remain.     Unable to remove from Prior to Admit Orders (remove at discharge)   Sertraline 50 mg daily - old SSRI, on fluoxetine now   Olanzapine 5 mg ODT - never filled   Olanzapine 5 mg tablet - old dose, last filled 07/30/2024  Hydroxyzine 50 mg tablet - last filled 12/15/2023  Aripiprazole 10 mg daily at 0900 - old Rx, filled once 07/30/2024   Fluoxetine 10 mg - old dose     The list below reflects the updated PTA list.   Prior to Admission Medications   Prescriptions Last Dose Informant   FLUoxetine (PROzac) 20 mg capsule  Self   Sig: Take 2 capsules (40 mg) by mouth once daily.   OLANZapine (ZyPREXA) 10 mg tablet  Self   Sig: Take 1 tablet (10 mg) by mouth once daily at bedtime.   hydrOXYzine pamoate (Vistaril) 25 mg capsule Not Taking Self   Sig: Take 1 capsule (25 mg) by mouth 4 times a day as needed for anxiety.   Patient not taking: Reported on 11/3/2024      Facility-Administered Medications: None          The list below reflects the updated allergy list. Please review each documented allergy for additional clarification and justification.  Allergies  Reviewed by Carmita Wynn RN on 11/3/2024   No Known Allergies         Patient was unable to be assessed for M2B at discharge.   Local pharmacy:   Name Phone Address    [id number] [place] [facility] [address] [facility] [phone] [address] [facility] [address]   [id number] [place] [id number] [facility] [phone]  Subsequent Progress Note  Patient: Jhoana London  YOB: 1967  MRN: [id number]    Chief Complaint:  Chief Complaint   Patient presents with    Follow-Up from Hospital       CV Data:  10/23/24 Cath Mild LI. EF 20%  10/24 Echo EF recovered to 60-65%     Subjective/HPI: had 2 recent admission. She has NICM which completely recoevered and Life Vest stopped. She had 1 symptomaic AF. Eliquis s=was stopped duew to Hematemesis.  She has not had further palpitations. She feels well no cp no sob      EKG:    Smoker  Lives w son  Work - [employer].     Past Medical History:   Diagnosis Date    Acid reflux     Anxiety     Asthma     COPD (chronic obstructive pulmonary disease) (HCC)     Crohn's colitis (HCC)     Ejection fraction < 50%     Heart failure (HCC)     PTSD (post-traumatic stress disorder)     Smoker        Past Surgical History:   Procedure Laterality Date    CARDIAC PROCEDURE N/A 10/23/2024    Left heart cath / coronary angiography performed by Fred Avila DO [place] [facility]    CHOLECYSTECTOMY      HIATAL HERNIA REPAIR      TONSILLECTOMY      UPPER GASTROINTESTINAL ENDOSCOPY N/A 10/30/2024    ESOPHAGOGASTRODUODENOSCOPY performed by Nahid Riley MD at [facility]       No family history on file.    Social History     Socioeconomic History    Marital status:    Tobacco Use    Smoking status: Every Day     Types: Cigarettes     Passive exposure: Current   Substance and Sexual Activity    Alcohol use: Not Currently    Drug use: Yes     Types: Marijuana (Weed)     Comment: edible     Social Determinants of Health     Financial Resource Strain: Medium Risk (1/12/2024)    Received from [facility]    Overall Financial Resource Strain (CARDIA)     Difficulty of Paying Living Expenses: Somewhat hard   Food Insecurity: No Food Insecurity (10/29/2024)    Hunger Vital Sign     Worried About Running Out of Food in the Last Year: Never true     Ran Out of Food in the Last Year:  "[facility] [id number]       Sources:   [facility]  Pharmacy dispense history  Patient interview Good historian, interview in EDAC17. Recalls current medications from memory (Prozac and olanzapine). Reports not taking prior to arrival due to not having medications outpatient.   Chart Review      Additional Comments:  Patient reports olanzapine [when taking] does not help to stop the negative thoughts he is having. Patient is requesting to trial a different antipsychotic. He would like to continue fluoxetine.       Rudy Berg, PharmD  11/03/24 8:47 PM     Secure Chat preferred   If no response call [phone] or [employer] \"Med Rec\"   "

## 2025-03-06 ENCOUNTER — APPOINTMENT (OUTPATIENT)
Dept: CARDIOLOGY | Facility: HOSPITAL | Age: 40
DRG: 558 | End: 2025-03-06
Payer: MEDICARE

## 2025-03-06 ENCOUNTER — HOSPITAL ENCOUNTER (INPATIENT)
Facility: HOSPITAL | Age: 40
End: 2025-03-06
Attending: EMERGENCY MEDICINE | Admitting: STUDENT IN AN ORGANIZED HEALTH CARE EDUCATION/TRAINING PROGRAM
Payer: MEDICARE

## 2025-03-06 DIAGNOSIS — R45.851 SUICIDAL THOUGHTS: ICD-10-CM

## 2025-03-06 DIAGNOSIS — F19.10 POLYSUBSTANCE ABUSE (MULTI): ICD-10-CM

## 2025-03-06 DIAGNOSIS — M62.82 NON-TRAUMATIC RHABDOMYOLYSIS: Primary | ICD-10-CM

## 2025-03-06 DIAGNOSIS — M62.82: ICD-10-CM

## 2025-03-06 DIAGNOSIS — N17.9: ICD-10-CM

## 2025-03-06 DIAGNOSIS — F22 PSYCHOSIS, PARANOID (MULTI): ICD-10-CM

## 2025-03-06 DIAGNOSIS — R74.01 TRANSAMINITIS: ICD-10-CM

## 2025-03-06 DIAGNOSIS — F29 PSYCHOSIS, UNSPECIFIED PSYCHOSIS TYPE (MULTI): ICD-10-CM

## 2025-03-06 DIAGNOSIS — Z72.0 TOBACCO ABUSE: ICD-10-CM

## 2025-03-06 DIAGNOSIS — R45.850 HOMICIDAL IDEATION: ICD-10-CM

## 2025-03-06 LAB
ALBUMIN SERPL BCP-MCNC: 4.6 G/DL (ref 3.4–5)
ALP SERPL-CCNC: 76 U/L (ref 33–120)
ALT SERPL W P-5'-P-CCNC: 119 U/L (ref 10–52)
ANION GAP SERPL CALCULATED.3IONS-SCNC: 17 MMOL/L (ref 10–20)
APAP SERPL-MCNC: <10 UG/ML
AST SERPL W P-5'-P-CCNC: 171 U/L (ref 9–39)
BASOPHILS # BLD AUTO: 0.03 X10*3/UL (ref 0–0.1)
BASOPHILS NFR BLD AUTO: 0.2 %
BILIRUB SERPL-MCNC: 1.5 MG/DL (ref 0–1.2)
BUN SERPL-MCNC: 21 MG/DL (ref 6–23)
CALCIUM SERPL-MCNC: 9.2 MG/DL (ref 8.6–10.3)
CHLORIDE SERPL-SCNC: 98 MMOL/L (ref 98–107)
CK SERPL-CCNC: 7793 U/L (ref 0–325)
CO2 SERPL-SCNC: 22 MMOL/L (ref 21–32)
CREAT SERPL-MCNC: 1.36 MG/DL (ref 0.5–1.3)
EGFRCR SERPLBLD CKD-EPI 2021: 69 ML/MIN/1.73M*2
EOSINOPHIL # BLD AUTO: 0.04 X10*3/UL (ref 0–0.7)
EOSINOPHIL NFR BLD AUTO: 0.3 %
ERYTHROCYTE [DISTWIDTH] IN BLOOD BY AUTOMATED COUNT: 12.9 % (ref 11.5–14.5)
ETHANOL SERPL-MCNC: <10 MG/DL
FLUAV RNA RESP QL NAA+PROBE: NOT DETECTED
FLUBV RNA RESP QL NAA+PROBE: NOT DETECTED
GLUCOSE SERPL-MCNC: 116 MG/DL (ref 74–99)
HCT VFR BLD AUTO: 37.3 % (ref 41–52)
HGB BLD-MCNC: 12.7 G/DL (ref 13.5–17.5)
IMM GRANULOCYTES # BLD AUTO: 0.03 X10*3/UL (ref 0–0.7)
IMM GRANULOCYTES NFR BLD AUTO: 0.2 % (ref 0–0.9)
LYMPHOCYTES # BLD AUTO: 3.49 X10*3/UL (ref 1.2–4.8)
LYMPHOCYTES NFR BLD AUTO: 28.8 %
MCH RBC QN AUTO: 27.7 PG (ref 26–34)
MCHC RBC AUTO-ENTMCNC: 34 G/DL (ref 32–36)
MCV RBC AUTO: 81 FL (ref 80–100)
MONOCYTES # BLD AUTO: 1.41 X10*3/UL (ref 0.1–1)
MONOCYTES NFR BLD AUTO: 11.7 %
NEUTROPHILS # BLD AUTO: 7.1 X10*3/UL (ref 1.2–7.7)
NEUTROPHILS NFR BLD AUTO: 58.8 %
NRBC BLD-RTO: 0 /100 WBCS (ref 0–0)
PLATELET # BLD AUTO: 463 X10*3/UL (ref 150–450)
POTASSIUM SERPL-SCNC: 3.3 MMOL/L (ref 3.5–5.3)
PROT SERPL-MCNC: 7.7 G/DL (ref 6.4–8.2)
RBC # BLD AUTO: 4.58 X10*6/UL (ref 4.5–5.9)
RSV RNA RESP QL NAA+PROBE: NOT DETECTED
SALICYLATES SERPL-MCNC: <3 MG/DL
SARS-COV-2 RNA RESP QL NAA+PROBE: NOT DETECTED
SODIUM SERPL-SCNC: 134 MMOL/L (ref 136–145)
WBC # BLD AUTO: 12.1 X10*3/UL (ref 4.4–11.3)

## 2025-03-06 PROCEDURE — 80143 DRUG ASSAY ACETAMINOPHEN: CPT | Performed by: EMERGENCY MEDICINE

## 2025-03-06 PROCEDURE — 87637 SARSCOV2&INF A&B&RSV AMP PRB: CPT | Performed by: EMERGENCY MEDICINE

## 2025-03-06 PROCEDURE — 85025 COMPLETE CBC W/AUTO DIFF WBC: CPT | Performed by: EMERGENCY MEDICINE

## 2025-03-06 PROCEDURE — 80053 COMPREHEN METABOLIC PANEL: CPT | Performed by: EMERGENCY MEDICINE

## 2025-03-06 PROCEDURE — 90839 PSYTX CRISIS INITIAL 60 MIN: CPT

## 2025-03-06 PROCEDURE — 96360 HYDRATION IV INFUSION INIT: CPT

## 2025-03-06 PROCEDURE — 82550 ASSAY OF CK (CPK): CPT | Performed by: PHYSICIAN ASSISTANT

## 2025-03-06 PROCEDURE — 2500000004 HC RX 250 GENERAL PHARMACY W/ HCPCS (ALT 636 FOR OP/ED): Performed by: PHYSICIAN ASSISTANT

## 2025-03-06 PROCEDURE — 2500000004 HC RX 250 GENERAL PHARMACY W/ HCPCS (ALT 636 FOR OP/ED): Performed by: EMERGENCY MEDICINE

## 2025-03-06 PROCEDURE — 93005 ELECTROCARDIOGRAM TRACING: CPT

## 2025-03-06 PROCEDURE — 99285 EMERGENCY DEPT VISIT HI MDM: CPT | Performed by: EMERGENCY MEDICINE

## 2025-03-06 PROCEDURE — 96361 HYDRATE IV INFUSION ADD-ON: CPT

## 2025-03-06 PROCEDURE — 80320 DRUG SCREEN QUANTALCOHOLS: CPT | Performed by: EMERGENCY MEDICINE

## 2025-03-06 PROCEDURE — 36415 COLL VENOUS BLD VENIPUNCTURE: CPT | Performed by: EMERGENCY MEDICINE

## 2025-03-06 PROCEDURE — 96372 THER/PROPH/DIAG INJ SC/IM: CPT | Performed by: EMERGENCY MEDICINE

## 2025-03-06 PROCEDURE — 99291 CRITICAL CARE FIRST HOUR: CPT | Performed by: PHYSICIAN ASSISTANT

## 2025-03-06 RX ORDER — HALOPERIDOL LACTATE 5 MG/ML
5 INJECTION, SOLUTION INTRAMUSCULAR ONCE
Status: COMPLETED | OUTPATIENT
Start: 2025-03-06 | End: 2025-03-06

## 2025-03-06 RX ORDER — DIPHENHYDRAMINE HYDROCHLORIDE 50 MG/ML
50 INJECTION INTRAMUSCULAR; INTRAVENOUS ONCE
Status: COMPLETED | OUTPATIENT
Start: 2025-03-06 | End: 2025-03-06

## 2025-03-06 RX ORDER — LORAZEPAM 2 MG/ML
2 INJECTION INTRAMUSCULAR ONCE
Status: COMPLETED | OUTPATIENT
Start: 2025-03-06 | End: 2025-03-06

## 2025-03-06 RX ORDER — POTASSIUM CHLORIDE 1.5 G/1.58G
20 POWDER, FOR SOLUTION ORAL ONCE
Status: DISCONTINUED | OUTPATIENT
Start: 2025-03-06 | End: 2025-03-07

## 2025-03-06 RX ADMIN — SODIUM CHLORIDE, SODIUM LACTATE, POTASSIUM CHLORIDE, AND CALCIUM CHLORIDE 1000 ML: 600; 310; 30; 20 INJECTION, SOLUTION INTRAVENOUS at 22:09

## 2025-03-06 RX ADMIN — LORAZEPAM 2 MG: 2 INJECTION INTRAMUSCULAR; INTRAVENOUS at 21:48

## 2025-03-06 RX ADMIN — SODIUM CHLORIDE 1000 ML: 900 INJECTION, SOLUTION INTRAVENOUS at 22:09

## 2025-03-06 RX ADMIN — DIPHENHYDRAMINE HYDROCHLORIDE 50 MG: 50 INJECTION, SOLUTION INTRAMUSCULAR; INTRAVENOUS at 21:48

## 2025-03-06 RX ADMIN — HALOPERIDOL LACTATE 5 MG: 5 INJECTION, SOLUTION INTRAMUSCULAR at 21:48

## 2025-03-06 SDOH — HEALTH STABILITY: MENTAL HEALTH

## 2025-03-06 SDOH — HEALTH STABILITY: MENTAL HEALTH: CONTENT: BLAMING OTHERS;DELUSIONS;PREOCCUPATION

## 2025-03-06 SDOH — HEALTH STABILITY: MENTAL HEALTH: WISH TO BE DEAD (PAST 1 MONTH): YES

## 2025-03-06 SDOH — HEALTH STABILITY: MENTAL HEALTH: ACTIVE SUICIDAL IDEATION WITH SPECIFIC PLAN AND INTENT (PAST 1 MONTH): NO

## 2025-03-06 SDOH — HEALTH STABILITY: MENTAL HEALTH: BEHAVIORAL HEALTH(WDL): EXCEPTIONS TO WDL

## 2025-03-06 SDOH — HEALTH STABILITY: MENTAL HEALTH: SUICIDE ASSESSMENT: ADULT (C-SSRS)

## 2025-03-06 SDOH — HEALTH STABILITY: MENTAL HEALTH: NON-SPECIFIC ACTIVE SUICIDAL THOUGHTS (PAST 1 MONTH): YES

## 2025-03-06 SDOH — SOCIAL STABILITY: SOCIAL INSECURITY: FAMILY BEHAVIORS: CALM;COOPERATIVE;SUPPORTIVE

## 2025-03-06 SDOH — SOCIAL STABILITY: SOCIAL NETWORK: VISITOR BEHAVIORS: APPROPRIATE FOR SITUATION;CALM;COOPERATIVE;SUPPORTIVE

## 2025-03-06 SDOH — SOCIAL STABILITY: SOCIAL NETWORK: PARENT/GUARDIAN/SIGNIFICANT OTHER INVOLVEMENT: ATTENTIVE TO PATIENT NEEDS

## 2025-03-06 SDOH — HEALTH STABILITY: MENTAL HEALTH: HAVE YOU EVER TRIED TO KILL YOURSELF?: NO

## 2025-03-06 SDOH — HEALTH STABILITY: MENTAL HEALTH: IN THE PAST FEW WEEKS, HAVE YOU WISHED YOU WERE DEAD?: YES

## 2025-03-06 SDOH — HEALTH STABILITY: MENTAL HEALTH: ARE YOU HAVING THOUGHTS OF KILLING YOURSELF RIGHT NOW?: YES

## 2025-03-06 SDOH — HEALTH STABILITY: MENTAL HEALTH: SUICIDAL BEHAVIOR (LIFETIME): YES

## 2025-03-06 SDOH — HEALTH STABILITY: MENTAL HEALTH: SUICIDAL BEHAVIOR (3 MONTHS): YES

## 2025-03-06 SDOH — HEALTH STABILITY: MENTAL HEALTH: DELUSIONS: PARANOID

## 2025-03-06 SDOH — HEALTH STABILITY: MENTAL HEALTH: ACTIVE SUICIDAL IDEATION WITH SOME INTENT TO ACT, WITHOUT SPECIFIC PLAN (PAST 1 MONTH): NO

## 2025-03-06 SDOH — HEALTH STABILITY: MENTAL HEALTH: IN THE PAST FEW WEEKS, HAVE YOU FELT THAT YOU OR YOUR FAMILY WOULD BE BETTER OFF IF YOU WERE DEAD?: YES

## 2025-03-06 SDOH — HEALTH STABILITY: MENTAL HEALTH: SLEEP PATTERN: RESTLESSNESS

## 2025-03-06 SDOH — HEALTH STABILITY: MENTAL HEALTH: DEPRESSION SYMPTOMS: NO PROBLEMS REPORTED OR OBSERVED.

## 2025-03-06 SDOH — HEALTH STABILITY: MENTAL HEALTH: ANXIETY SYMPTOMS: GENERALIZED

## 2025-03-06 SDOH — HEALTH STABILITY: MENTAL HEALTH
BEHAVIORS/MOOD: AGITATED;ANXIOUS;APPEARS INTOXICATED;AFFECT INCONSISTENT WITH MOOD;COOPERATIVE;DELUSIONS;HYPER-VERBAL;PACING;RESTLESS;PARANOID;GUARDED

## 2025-03-06 SDOH — HEALTH STABILITY: MENTAL HEALTH: IN THE PAST WEEK, HAVE YOU BEEN HAVING THOUGHTS ABOUT KILLING YOURSELF?: YES

## 2025-03-06 ASSESSMENT — LIFESTYLE VARIABLES
SUBSTANCE_ABUSE_PAST_12_MONTHS: YES
PRESCIPTION_ABUSE_PAST_12_MONTHS: NO

## 2025-03-06 ASSESSMENT — PAIN - FUNCTIONAL ASSESSMENT: PAIN_FUNCTIONAL_ASSESSMENT: 0-10

## 2025-03-06 ASSESSMENT — PAIN SCALES - GENERAL: PAINLEVEL_OUTOF10: 0 - NO PAIN

## 2025-03-07 ENCOUNTER — APPOINTMENT (OUTPATIENT)
Dept: RADIOLOGY | Facility: HOSPITAL | Age: 40
DRG: 558 | End: 2025-03-07
Payer: MEDICARE

## 2025-03-07 LAB
ALBUMIN SERPL BCP-MCNC: 3.9 G/DL (ref 3.4–5)
ALP SERPL-CCNC: 66 U/L (ref 33–120)
ALT SERPL W P-5'-P-CCNC: 101 U/L (ref 10–52)
ANION GAP SERPL CALCULATED.3IONS-SCNC: 13 MMOL/L (ref 10–20)
AST SERPL W P-5'-P-CCNC: 138 U/L (ref 9–39)
ATRIAL RATE: 115 BPM
BILIRUB SERPL-MCNC: 1.4 MG/DL (ref 0–1.2)
BUN SERPL-MCNC: 19 MG/DL (ref 6–23)
CALCIUM SERPL-MCNC: 8.4 MG/DL (ref 8.6–10.3)
CHLORIDE SERPL-SCNC: 103 MMOL/L (ref 98–107)
CK SERPL-CCNC: 5343 U/L (ref 0–325)
CK SERPL-CCNC: 6275 U/L (ref 0–325)
CO2 SERPL-SCNC: 24 MMOL/L (ref 21–32)
CREAT SERPL-MCNC: 1.2 MG/DL (ref 0.5–1.3)
EGFRCR SERPLBLD CKD-EPI 2021: 80 ML/MIN/1.73M*2
ERYTHROCYTE [DISTWIDTH] IN BLOOD BY AUTOMATED COUNT: 12.7 % (ref 11.5–14.5)
GLUCOSE SERPL-MCNC: 86 MG/DL (ref 74–99)
HAV IGM SER QL: NONREACTIVE
HBV CORE IGM SER QL: NONREACTIVE
HBV SURFACE AG SERPL QL IA: NONREACTIVE
HCT VFR BLD AUTO: 36.7 % (ref 41–52)
HCV AB SER QL: NONREACTIVE
HGB BLD-MCNC: 12.5 G/DL (ref 13.5–17.5)
MAGNESIUM SERPL-MCNC: 2.39 MG/DL (ref 1.6–2.4)
MCH RBC QN AUTO: 27.8 PG (ref 26–34)
MCHC RBC AUTO-ENTMCNC: 34.1 G/DL (ref 32–36)
MCV RBC AUTO: 82 FL (ref 80–100)
NRBC BLD-RTO: 0 /100 WBCS (ref 0–0)
P AXIS: 24 DEGREES
PLATELET # BLD AUTO: 408 X10*3/UL (ref 150–450)
POTASSIUM SERPL-SCNC: 3.7 MMOL/L (ref 3.5–5.3)
PR INTERVAL: 144 MS
PROT SERPL-MCNC: 7.2 G/DL (ref 6.4–8.2)
Q ONSET: 220 MS
QRS COUNT: 19 BEATS
QRS DURATION: 74 MS
QT INTERVAL: 356 MS
QTC CALCULATION(BAZETT): 492 MS
QTC FREDERICIA: 442 MS
R AXIS: 60 DEGREES
RBC # BLD AUTO: 4.49 X10*6/UL (ref 4.5–5.9)
SODIUM SERPL-SCNC: 136 MMOL/L (ref 136–145)
T AXIS: 48 DEGREES
T OFFSET: 398 MS
VENTRICULAR RATE: 115 BPM
WBC # BLD AUTO: 6.8 X10*3/UL (ref 4.4–11.3)

## 2025-03-07 PROCEDURE — 99222 1ST HOSP IP/OBS MODERATE 55: CPT | Performed by: NURSE PRACTITIONER

## 2025-03-07 PROCEDURE — 84132 ASSAY OF SERUM POTASSIUM: CPT | Performed by: NURSE PRACTITIONER

## 2025-03-07 PROCEDURE — 36415 COLL VENOUS BLD VENIPUNCTURE: CPT | Performed by: STUDENT IN AN ORGANIZED HEALTH CARE EDUCATION/TRAINING PROGRAM

## 2025-03-07 PROCEDURE — 2500000004 HC RX 250 GENERAL PHARMACY W/ HCPCS (ALT 636 FOR OP/ED): Performed by: INTERNAL MEDICINE

## 2025-03-07 PROCEDURE — 36415 COLL VENOUS BLD VENIPUNCTURE: CPT | Performed by: NURSE PRACTITIONER

## 2025-03-07 PROCEDURE — 76705 ECHO EXAM OF ABDOMEN: CPT

## 2025-03-07 PROCEDURE — 2500000004 HC RX 250 GENERAL PHARMACY W/ HCPCS (ALT 636 FOR OP/ED): Performed by: STUDENT IN AN ORGANIZED HEALTH CARE EDUCATION/TRAINING PROGRAM

## 2025-03-07 PROCEDURE — 2500000001 HC RX 250 WO HCPCS SELF ADMINISTERED DRUGS (ALT 637 FOR MEDICARE OP)

## 2025-03-07 PROCEDURE — 2500000001 HC RX 250 WO HCPCS SELF ADMINISTERED DRUGS (ALT 637 FOR MEDICARE OP): Performed by: NURSE PRACTITIONER

## 2025-03-07 PROCEDURE — 1200000002 HC GENERAL ROOM WITH TELEMETRY DAILY

## 2025-03-07 PROCEDURE — 82550 ASSAY OF CK (CPK): CPT | Performed by: NURSE PRACTITIONER

## 2025-03-07 PROCEDURE — 85027 COMPLETE CBC AUTOMATED: CPT | Performed by: NURSE PRACTITIONER

## 2025-03-07 PROCEDURE — 80074 ACUTE HEPATITIS PANEL: CPT | Mod: WESLAB | Performed by: NURSE PRACTITIONER

## 2025-03-07 PROCEDURE — 2500000004 HC RX 250 GENERAL PHARMACY W/ HCPCS (ALT 636 FOR OP/ED)

## 2025-03-07 PROCEDURE — 76705 ECHO EXAM OF ABDOMEN: CPT | Performed by: RADIOLOGY

## 2025-03-07 PROCEDURE — 71045 X-RAY EXAM CHEST 1 VIEW: CPT | Performed by: RADIOLOGY

## 2025-03-07 PROCEDURE — 90792 PSYCH DIAG EVAL W/MED SRVCS: CPT

## 2025-03-07 PROCEDURE — 2500000004 HC RX 250 GENERAL PHARMACY W/ HCPCS (ALT 636 FOR OP/ED): Performed by: NURSE PRACTITIONER

## 2025-03-07 PROCEDURE — 2500000002 HC RX 250 W HCPCS SELF ADMINISTERED DRUGS (ALT 637 FOR MEDICARE OP, ALT 636 FOR OP/ED)

## 2025-03-07 PROCEDURE — 71045 X-RAY EXAM CHEST 1 VIEW: CPT

## 2025-03-07 PROCEDURE — 82550 ASSAY OF CK (CPK): CPT | Performed by: STUDENT IN AN ORGANIZED HEALTH CARE EDUCATION/TRAINING PROGRAM

## 2025-03-07 PROCEDURE — 83735 ASSAY OF MAGNESIUM: CPT | Performed by: NURSE PRACTITIONER

## 2025-03-07 RX ORDER — TALC
3 POWDER (GRAM) TOPICAL DAILY
Status: DISPENSED | OUTPATIENT
Start: 2025-03-07

## 2025-03-07 RX ORDER — ONDANSETRON 4 MG/1
4 TABLET, FILM COATED ORAL EVERY 8 HOURS PRN
Status: ACTIVE | OUTPATIENT
Start: 2025-03-07

## 2025-03-07 RX ORDER — HYDROXYZINE PAMOATE 25 MG/1
25 CAPSULE ORAL EVERY 4 HOURS PRN
Status: DISCONTINUED | OUTPATIENT
Start: 2025-03-07 | End: 2025-03-07

## 2025-03-07 RX ORDER — SODIUM CHLORIDE 9 MG/ML
250 INJECTION, SOLUTION INTRAVENOUS CONTINUOUS
Status: DISCONTINUED | OUTPATIENT
Start: 2025-03-07 | End: 2025-03-07

## 2025-03-07 RX ORDER — MULTIVIT-MIN/IRON FUM/FOLIC AC 7.5 MG-4
1 TABLET ORAL DAILY
Status: DISPENSED | OUTPATIENT
Start: 2025-03-07

## 2025-03-07 RX ORDER — LORAZEPAM 2 MG/ML
0.5 INJECTION INTRAMUSCULAR EVERY 4 HOURS PRN
Status: DISCONTINUED | OUTPATIENT
Start: 2025-03-07 | End: 2025-03-09

## 2025-03-07 RX ORDER — ONDANSETRON HYDROCHLORIDE 2 MG/ML
4 INJECTION, SOLUTION INTRAVENOUS EVERY 8 HOURS PRN
Status: ACTIVE | OUTPATIENT
Start: 2025-03-07

## 2025-03-07 RX ORDER — ENOXAPARIN SODIUM 100 MG/ML
40 INJECTION SUBCUTANEOUS EVERY 24 HOURS
Status: DISPENSED | OUTPATIENT
Start: 2025-03-07

## 2025-03-07 RX ORDER — HYDROXYZINE PAMOATE 50 MG/1
50 CAPSULE ORAL EVERY 6 HOURS PRN
Status: DISCONTINUED | OUTPATIENT
Start: 2025-03-07 | End: 2025-03-09

## 2025-03-07 RX ORDER — ACETAMINOPHEN 160 MG/5ML
650 SOLUTION ORAL EVERY 4 HOURS PRN
Status: ACTIVE | OUTPATIENT
Start: 2025-03-07

## 2025-03-07 RX ORDER — SODIUM CHLORIDE 9 MG/ML
250 INJECTION, SOLUTION INTRAVENOUS CONTINUOUS
Status: ACTIVE | OUTPATIENT
Start: 2025-03-07 | End: 2025-03-08

## 2025-03-07 RX ORDER — FOLIC ACID 1 MG/1
1 TABLET ORAL DAILY
Status: DISPENSED | OUTPATIENT
Start: 2025-03-07

## 2025-03-07 RX ORDER — DIPHENHYDRAMINE HYDROCHLORIDE 50 MG/ML
50 INJECTION INTRAMUSCULAR; INTRAVENOUS EVERY 6 HOURS PRN
Status: ACTIVE | OUTPATIENT
Start: 2025-03-07

## 2025-03-07 RX ORDER — ACETAMINOPHEN 500 MG
5 TABLET ORAL NIGHTLY
Status: DISCONTINUED | OUTPATIENT
Start: 2025-03-07 | End: 2025-03-07

## 2025-03-07 RX ORDER — SODIUM CHLORIDE 9 MG/ML
100 INJECTION, SOLUTION INTRAVENOUS CONTINUOUS
Status: DISCONTINUED | OUTPATIENT
Start: 2025-03-07 | End: 2025-03-07

## 2025-03-07 RX ORDER — HALOPERIDOL 5 MG/1
5 TABLET ORAL EVERY 6 HOURS PRN
Status: ACTIVE | OUTPATIENT
Start: 2025-03-07

## 2025-03-07 RX ORDER — DIPHENHYDRAMINE HYDROCHLORIDE 50 MG/ML
50 INJECTION INTRAMUSCULAR; INTRAVENOUS EVERY 6 HOURS PRN
Status: DISCONTINUED | OUTPATIENT
Start: 2025-03-07 | End: 2025-03-07

## 2025-03-07 RX ORDER — POTASSIUM CHLORIDE 14.9 MG/ML
20 INJECTION INTRAVENOUS ONCE
Status: COMPLETED | OUTPATIENT
Start: 2025-03-07 | End: 2025-03-07

## 2025-03-07 RX ORDER — LANOLIN ALCOHOL/MO/W.PET/CERES
100 CREAM (GRAM) TOPICAL DAILY
Status: DISPENSED | OUTPATIENT
Start: 2025-03-07

## 2025-03-07 RX ORDER — POTASSIUM CHLORIDE 14.9 MG/ML
20 INJECTION INTRAVENOUS ONCE
Status: DISCONTINUED | OUTPATIENT
Start: 2025-03-07 | End: 2025-03-07

## 2025-03-07 RX ORDER — DIPHENHYDRAMINE HCL 50 MG
50 CAPSULE ORAL EVERY 6 HOURS PRN
Status: ACTIVE | OUTPATIENT
Start: 2025-03-07

## 2025-03-07 RX ORDER — SODIUM CHLORIDE 9 MG/ML
125 INJECTION, SOLUTION INTRAVENOUS CONTINUOUS
Status: DISCONTINUED | OUTPATIENT
Start: 2025-03-07 | End: 2025-03-07

## 2025-03-07 RX ORDER — ACETAMINOPHEN 325 MG/1
650 TABLET ORAL EVERY 4 HOURS PRN
Status: DISPENSED | OUTPATIENT
Start: 2025-03-07

## 2025-03-07 RX ORDER — HALOPERIDOL LACTATE 5 MG/ML
5 INJECTION, SOLUTION INTRAMUSCULAR EVERY 6 HOURS PRN
Status: ACTIVE | OUTPATIENT
Start: 2025-03-07

## 2025-03-07 RX ORDER — BUPROPION HYDROCHLORIDE 150 MG/1
150 TABLET ORAL DAILY
Status: ON HOLD | COMMUNITY

## 2025-03-07 RX ORDER — FLUOXETINE HYDROCHLORIDE 20 MG/1
20 CAPSULE ORAL DAILY
Status: DISPENSED | OUTPATIENT
Start: 2025-03-07

## 2025-03-07 RX ORDER — POLYETHYLENE GLYCOL 3350 17 G/17G
17 POWDER, FOR SOLUTION ORAL DAILY PRN
Status: ACTIVE | OUTPATIENT
Start: 2025-03-07

## 2025-03-07 RX ORDER — HALOPERIDOL LACTATE 5 MG/ML
5 INJECTION, SOLUTION INTRAMUSCULAR EVERY 6 HOURS PRN
Status: DISCONTINUED | OUTPATIENT
Start: 2025-03-07 | End: 2025-03-07

## 2025-03-07 RX ADMIN — DIPHENHYDRAMINE HYDROCHLORIDE 50 MG: 50 INJECTION, SOLUTION INTRAMUSCULAR; INTRAVENOUS at 12:15

## 2025-03-07 RX ADMIN — Medication 100 MG: at 20:50

## 2025-03-07 RX ADMIN — FLUOXETINE HYDROCHLORIDE 20 MG: 20 CAPSULE ORAL at 12:15

## 2025-03-07 RX ADMIN — FOLIC ACID 1 MG: 1 TABLET ORAL at 20:50

## 2025-03-07 RX ADMIN — ACETAMINOPHEN 650 MG: 325 TABLET ORAL at 12:15

## 2025-03-07 RX ADMIN — SODIUM CHLORIDE 125 ML/HR: 900 INJECTION, SOLUTION INTRAVENOUS at 09:01

## 2025-03-07 RX ADMIN — SODIUM CHLORIDE 250 ML/HR: 900 INJECTION, SOLUTION INTRAVENOUS at 20:52

## 2025-03-07 RX ADMIN — SODIUM CHLORIDE 100 ML/HR: 900 INJECTION, SOLUTION INTRAVENOUS at 02:40

## 2025-03-07 RX ADMIN — SODIUM CHLORIDE 250 ML/HR: 900 INJECTION, SOLUTION INTRAVENOUS at 11:23

## 2025-03-07 RX ADMIN — Medication 1 TABLET: at 20:50

## 2025-03-07 RX ADMIN — POTASSIUM CHLORIDE 20 MEQ: 14.9 INJECTION, SOLUTION INTRAVENOUS at 09:01

## 2025-03-07 RX ADMIN — ENOXAPARIN SODIUM 40 MG: 40 INJECTION SUBCUTANEOUS at 09:02

## 2025-03-07 RX ADMIN — OLANZAPINE 15 MG: 5 TABLET, FILM COATED ORAL at 20:52

## 2025-03-07 SDOH — SOCIAL STABILITY: SOCIAL INSECURITY: WITHIN THE LAST YEAR, HAVE YOU BEEN AFRAID OF YOUR PARTNER OR EX-PARTNER?: PATIENT DECLINED

## 2025-03-07 SDOH — HEALTH STABILITY: PHYSICAL HEALTH
HOW OFTEN DO YOU NEED TO HAVE SOMEONE HELP YOU WHEN YOU READ INSTRUCTIONS, PAMPHLETS, OR OTHER WRITTEN MATERIAL FROM YOUR DOCTOR OR PHARMACY?: PATIENT DECLINES TO RESPOND

## 2025-03-07 SDOH — SOCIAL STABILITY: SOCIAL INSECURITY
WITHIN THE LAST YEAR, HAVE YOU BEEN KICKED, HIT, SLAPPED, OR OTHERWISE PHYSICALLY HURT BY YOUR PARTNER OR EX-PARTNER?: PATIENT DECLINED

## 2025-03-07 SDOH — SOCIAL STABILITY: SOCIAL INSECURITY: DOES ANYONE TRY TO KEEP YOU FROM HAVING/CONTACTING OTHER FRIENDS OR DOING THINGS OUTSIDE YOUR HOME?: NO

## 2025-03-07 SDOH — HEALTH STABILITY: MENTAL HEALTH: BEHAVIORAL HEALTH(WDL): EXCEPTIONS TO WDL

## 2025-03-07 SDOH — ECONOMIC STABILITY: HOUSING INSECURITY: AT ANY TIME IN THE PAST 12 MONTHS, WERE YOU HOMELESS OR LIVING IN A SHELTER (INCLUDING NOW)?: PATIENT DECLINED

## 2025-03-07 SDOH — HEALTH STABILITY: MENTAL HEALTH: SUICIDE ASSESSMENT: ADULT (C-SSRS)

## 2025-03-07 SDOH — HEALTH STABILITY: MENTAL HEALTH: HOW OFTEN DO YOU HAVE A DRINK CONTAINING ALCOHOL?: PATIENT DECLINED

## 2025-03-07 SDOH — SOCIAL STABILITY: SOCIAL INSECURITY
WITHIN THE LAST YEAR, HAVE YOU BEEN HUMILIATED OR EMOTIONALLY ABUSED IN OTHER WAYS BY YOUR PARTNER OR EX-PARTNER?: PATIENT DECLINED

## 2025-03-07 SDOH — SOCIAL STABILITY: SOCIAL INSECURITY
WITHIN THE LAST YEAR, HAVE YOU BEEN RAPED OR FORCED TO HAVE ANY KIND OF SEXUAL ACTIVITY BY YOUR PARTNER OR EX-PARTNER?: PATIENT DECLINED

## 2025-03-07 SDOH — ECONOMIC STABILITY: FOOD INSECURITY
WITHIN THE PAST 12 MONTHS, YOU WORRIED THAT YOUR FOOD WOULD RUN OUT BEFORE YOU GOT THE MONEY TO BUY MORE.: PATIENT DECLINED

## 2025-03-07 SDOH — SOCIAL STABILITY: SOCIAL INSECURITY: ARE YOU OR HAVE YOU BEEN THREATENED OR ABUSED PHYSICALLY, EMOTIONALLY, OR SEXUALLY BY ANYONE?: YES

## 2025-03-07 SDOH — SOCIAL STABILITY: SOCIAL INSECURITY: DO YOU FEEL ANYONE HAS EXPLOITED OR TAKEN ADVANTAGE OF YOU FINANCIALLY OR OF YOUR PERSONAL PROPERTY?: NO

## 2025-03-07 SDOH — SOCIAL STABILITY: SOCIAL INSECURITY: ARE THERE ANY APPARENT SIGNS OF INJURIES/BEHAVIORS THAT COULD BE RELATED TO ABUSE/NEGLECT?: NO

## 2025-03-07 SDOH — SOCIAL STABILITY: SOCIAL NETWORK
DO YOU BELONG TO ANY CLUBS OR ORGANIZATIONS SUCH AS CHURCH GROUPS, UNIONS, FRATERNAL OR ATHLETIC GROUPS, OR SCHOOL GROUPS?: PATIENT DECLINED

## 2025-03-07 SDOH — SOCIAL STABILITY: SOCIAL NETWORK: HOW OFTEN DO YOU ATTEND MEETINGS OF THE CLUBS OR ORGANIZATIONS YOU BELONG TO?: PATIENT DECLINED

## 2025-03-07 SDOH — ECONOMIC STABILITY: HOUSING INSECURITY: IN THE LAST 12 MONTHS, WAS THERE A TIME WHEN YOU WERE NOT ABLE TO PAY THE MORTGAGE OR RENT ON TIME?: PATIENT DECLINED

## 2025-03-07 SDOH — SOCIAL STABILITY: SOCIAL INSECURITY: HAS ANYONE EVER THREATENED TO HURT YOUR FAMILY OR YOUR PETS?: NO

## 2025-03-07 SDOH — SOCIAL STABILITY: SOCIAL INSECURITY: ARE YOU MARRIED, WIDOWED, DIVORCED, SEPARATED, NEVER MARRIED, OR LIVING WITH A PARTNER?: PATIENT DECLINED

## 2025-03-07 SDOH — SOCIAL STABILITY: SOCIAL INSECURITY: HAVE YOU HAD ANY THOUGHTS OF HARMING ANYONE ELSE?: YES

## 2025-03-07 SDOH — SOCIAL STABILITY: SOCIAL INSECURITY: POSSIBLE ABUSE REPORTED TO:: ADVOCATE

## 2025-03-07 SDOH — ECONOMIC STABILITY: INCOME INSECURITY
IN THE PAST 12 MONTHS HAS THE ELECTRIC, GAS, OIL, OR WATER COMPANY THREATENED TO SHUT OFF SERVICES IN YOUR HOME?: PATIENT DECLINED

## 2025-03-07 SDOH — SOCIAL STABILITY: SOCIAL NETWORK: HOW OFTEN DO YOU GET TOGETHER WITH FRIENDS OR RELATIVES?: PATIENT DECLINED

## 2025-03-07 SDOH — HEALTH STABILITY: PHYSICAL HEALTH: ON AVERAGE, HOW MANY DAYS PER WEEK DO YOU ENGAGE IN MODERATE TO STRENUOUS EXERCISE (LIKE A BRISK WALK)?: 0 DAYS

## 2025-03-07 SDOH — HEALTH STABILITY: MENTAL HEALTH: HOW OFTEN DO YOU HAVE SIX OR MORE DRINKS ON ONE OCCASION?: PATIENT DECLINED

## 2025-03-07 SDOH — SOCIAL STABILITY: SOCIAL INSECURITY: DO YOU FEEL UNSAFE GOING BACK TO THE PLACE WHERE YOU ARE LIVING?: UNABLE TO ASSESS

## 2025-03-07 SDOH — SOCIAL STABILITY: SOCIAL NETWORK: IN A TYPICAL WEEK, HOW MANY TIMES DO YOU TALK ON THE PHONE WITH FAMILY, FRIENDS, OR NEIGHBORS?: PATIENT DECLINED

## 2025-03-07 SDOH — ECONOMIC STABILITY: FOOD INSECURITY: WITHIN THE PAST 12 MONTHS, THE FOOD YOU BOUGHT JUST DIDN'T LAST AND YOU DIDN'T HAVE MONEY TO GET MORE.: PATIENT DECLINED

## 2025-03-07 SDOH — SOCIAL STABILITY: SOCIAL INSECURITY: HAVE YOU HAD THOUGHTS OF HARMING ANYONE ELSE?: YES

## 2025-03-07 SDOH — HEALTH STABILITY: MENTAL HEALTH
DO YOU FEEL STRESS - TENSE, RESTLESS, NERVOUS, OR ANXIOUS, OR UNABLE TO SLEEP AT NIGHT BECAUSE YOUR MIND IS TROUBLED ALL THE TIME - THESE DAYS?: PATIENT DECLINED

## 2025-03-07 SDOH — HEALTH STABILITY: MENTAL HEALTH: HOW MANY DRINKS CONTAINING ALCOHOL DO YOU HAVE ON A TYPICAL DAY WHEN YOU ARE DRINKING?: PATIENT DECLINED

## 2025-03-07 SDOH — SOCIAL STABILITY: SOCIAL INSECURITY: WERE YOU ABLE TO COMPLETE ALL THE BEHAVIORAL HEALTH SCREENINGS?: YES

## 2025-03-07 SDOH — HEALTH STABILITY: PHYSICAL HEALTH: ON AVERAGE, HOW MANY MINUTES DO YOU ENGAGE IN EXERCISE AT THIS LEVEL?: 0 MIN

## 2025-03-07 SDOH — ECONOMIC STABILITY: FOOD INSECURITY: HOW HARD IS IT FOR YOU TO PAY FOR THE VERY BASICS LIKE FOOD, HOUSING, MEDICAL CARE, AND HEATING?: PATIENT DECLINED

## 2025-03-07 SDOH — SOCIAL STABILITY: SOCIAL INSECURITY: ABUSE: ADULT

## 2025-03-07 SDOH — SOCIAL STABILITY: SOCIAL NETWORK: HOW OFTEN DO YOU ATTEND CHURCH OR RELIGIOUS SERVICES?: PATIENT DECLINED

## 2025-03-07 SDOH — ECONOMIC STABILITY: TRANSPORTATION INSECURITY
IN THE PAST 12 MONTHS, HAS LACK OF TRANSPORTATION KEPT YOU FROM MEDICAL APPOINTMENTS OR FROM GETTING MEDICATIONS?: PATIENT DECLINED

## 2025-03-07 ASSESSMENT — ACTIVITIES OF DAILY LIVING (ADL)
HEARING - RIGHT EAR: FUNCTIONAL
DRESSING YOURSELF: INDEPENDENT
FEEDING YOURSELF: INDEPENDENT
BATHING: INDEPENDENT
WALKS IN HOME: INDEPENDENT
PATIENT'S MEMORY ADEQUATE TO SAFELY COMPLETE DAILY ACTIVITIES?: YES
HEARING - LEFT EAR: FUNCTIONAL
GROOMING: INDEPENDENT
LACK_OF_TRANSPORTATION: YES
TOILETING: INDEPENDENT
ADEQUATE_TO_COMPLETE_ADL: YES
JUDGMENT_ADEQUATE_SAFELY_COMPLETE_DAILY_ACTIVITIES: YES
LACK_OF_TRANSPORTATION: PATIENT DECLINED

## 2025-03-07 ASSESSMENT — ENCOUNTER SYMPTOMS
APPETITE CHANGE: 0
DIARRHEA: 0
NUMBNESS: 0
LIGHT-HEADEDNESS: 0
DIZZINESS: 0
VOMITING: 0
ABDOMINAL PAIN: 0
FEVER: 0
NAUSEA: 0
FATIGUE: 0
CONSTIPATION: 0
SHORTNESS OF BREATH: 0
DYSURIA: 0
ABDOMINAL DISTENTION: 0
RHINORRHEA: 0
CHILLS: 0

## 2025-03-07 ASSESSMENT — PAIN SCALES - GENERAL
PAINLEVEL_OUTOF10: 0 - NO PAIN
PAINLEVEL_OUTOF10: 0 - NO PAIN

## 2025-03-07 ASSESSMENT — COLUMBIA-SUICIDE SEVERITY RATING SCALE - C-SSRS
1. SINCE LAST CONTACT, HAVE YOU WISHED YOU WERE DEAD OR WISHED YOU COULD GO TO SLEEP AND NOT WAKE UP?: YES
2. HAVE YOU ACTUALLY HAD ANY THOUGHTS OF KILLING YOURSELF?: YES
5. HAVE YOU STARTED TO WORK OUT OR WORKED OUT THE DETAILS OF HOW TO KILL YOURSELF? DO YOU INTEND TO CARRY OUT THIS PLAN?: NO
6. HAVE YOU EVER DONE ANYTHING, STARTED TO DO ANYTHING, OR PREPARED TO DO ANYTHING TO END YOUR LIFE?: NO

## 2025-03-07 ASSESSMENT — LIFESTYLE VARIABLES
AUDIT-C TOTAL SCORE: -1
HOW OFTEN DO YOU HAVE A DRINK CONTAINING ALCOHOL: MONTHLY OR LESS
HOW MANY STANDARD DRINKS CONTAINING ALCOHOL DO YOU HAVE ON A TYPICAL DAY: 1 OR 2
HOW OFTEN DO YOU HAVE 6 OR MORE DRINKS ON ONE OCCASION: PATIENT DECLINED
SKIP TO QUESTIONS 9-10: 0
AUDIT-C TOTAL SCORE: -1
AUDIT-C TOTAL SCORE: -1
PRESCIPTION_ABUSE_PAST_12_MONTHS: NO
SUBSTANCE_ABUSE_PAST_12_MONTHS: YES
SKIP TO QUESTIONS 9-10: 0

## 2025-03-07 ASSESSMENT — COGNITIVE AND FUNCTIONAL STATUS - GENERAL
DAILY ACTIVITIY SCORE: 24
DAILY ACTIVITIY SCORE: 24
MOBILITY SCORE: 24
PATIENT BASELINE BEDBOUND: NO
MOBILITY SCORE: 24

## 2025-03-07 ASSESSMENT — PATIENT HEALTH QUESTIONNAIRE - PHQ9
SUM OF ALL RESPONSES TO PHQ9 QUESTIONS 1 & 2: 6
2. FEELING DOWN, DEPRESSED OR HOPELESS: NEARLY EVERY DAY
1. LITTLE INTEREST OR PLEASURE IN DOING THINGS: NEARLY EVERY DAY

## 2025-03-07 NOTE — PROGRESS NOTES
Attestation/Supervisory note for WILL Bolivar      The patient is a 39-year-old male presenting to the emergency department for evaluation of psychosis, homicidal threats and suicidal ideation.  The patient reportedly does have a history of polysubstance abuse.  He reportedly uses methamphetamines cocaine and THC.  He reportedly went to [facility] to be evaluated for mental health care and the staff at [facility] reportedly did not have anybody available who could sign a pink slip and so they called EMS to have the patient transported to the emergency department.  He denies any specific suicidal plan.  He also denies any specific homicidal plan.  He states that he does have hallucinations but he cannot describe the hallucinations.  He denies any headache or visual changes.  No chest pain or shortness of breath.  No abdominal pain.  No nausea vomiting.  No diarrhea or constipation.  no urinary complaints.  No fever or chills.  No cough or congestion.  No focal weakness or numbness.  All pertinent positives and negatives are recorded above.  All other systems reviewed and otherwise negative.  Vital signs with hypertension and tachycardia but otherwise within normal limits.  Physical exam with a well-nourished well-developed male in no acute distress.  HEENT exam within normal limits.  He has no evidence of airway compromise or respiratory distress.  Abdominal exam is benign.  He does not have any gross motor, neurologic or vascular deficits on exam at this time but he intermittently becomes agitated and his behavior begins to escalate.      IM Haldol, IM Ativan and IM Benadryl ordered for agitation for the safety of the patient and the safety of the staff members.      EKG with sinus tachycardia at 115 bpm, normal axis, normal voltage, normal ST segment, and normal T waves.  There is borderline prolongation of the QT interval.      Diagnostic labs with mild leukocytosis, borderline anemia, mild  thrombocytosis, and elevated CK but the results of the urinalysis were pending at the time of my departure.    Crisis intervention was consulted and will attempt placement for inpatient mental health care once the patient is medically cleared      The hospitalist was consulted to discuss a possible admission due to his mild renal insufficiency and elevated CK.  He will consult on the patient and get a final decision about the admission after that.      WILL Bolivar will continue to manage the patient primarily.  Anticipate disposition based on the hospitalist consult.      Impression/diagnosis:  Polysubstance abuse  Psychosis, acute on chronic  Hypertension, unspecified  Depression with suicidal ideation  Homicidal threats  Leukocytosis, unspecified  Thrombocytosis  Renal insufficiency      I personally saw the patient and made/approve the management plan and take responsibility for the patient management.      I independently interpreted the following study (S) EKG and diagnostic labs      I personally discussed the patient's management with the patient and the crisis team        Saranya Anderson MD

## 2025-03-07 NOTE — H&P
Chief complaint: Suicidal and homicidal ideation    History Of Present Illness  Nathan Vaughn is a 39 y.o. male with a past medical history of polysubstance abuse, cellulitis, renal disease, thrombocytosis, homicidal thoughts, and psychosis who presented to the emergency department with complaints of suicidal and homicidal ideation.  Patient reportedly uses methamphetamines, cocaine, and THC.  Reports occasional alcohol use.  Smokes 5 cigarettes a day.  Declines nicotine patch.  He initially presented to [facility] to be evaluated for mental health.  There was no staff available to sign a pink slip so they called EMS and patient was brought to the emergency department.  Denies chest pain, shortness of breath, fevers, chills, nausea, or vomiting. No abdominal discomfort. Denies lightheadedness or dizziness. No dysuria.  Patient denied any specific suicide or homicidal plan.  He does report active suicidal and homicidal thoughts.  Patient was found to be in rhabdomyolysis the ED.  He did become agitated and behaviors began to escalate in the ED.  He was given IM Haldol, Ativan, and Benadryl which was effective.    In the ED: KG shows sinus tachycardia at 115 bpm, normal axis, normal voltage, normal ST segment, and normal T waves.  Borderline prolongation of QT interval.  Glucose was 116, sodium 134, potassium 3.3, BUN/creatinine 21/1.36, baseline is around 0.9-1.  Alk phos 76, ALT//171, T. bili 1.5.  CK was 7793, repeat 6275.  WBC 12.1, H&H 12.7/37.3, platelets 463.  Flu A/RSV/COVID-negative.  Tylenol and salicylate level negative.  Alcohol level negative.  UA and tox screen pending.  Ultrasound of the liver, acute hepatitis panel, and chest x-ray ordered and are pending.  He was given potassium placement in the ED.  He did receive a liter of fluids.  Started on continuous infusion.  Admitted to [facility] for further evaluation and treatment.  Past Medical History  Past Medical History:   Diagnosis  Date    Aggressive behavior 07/26/2019    Cannabis use disorder 03/15/2023    Cellulitis 11/01/2021    Formatting of this note might be different from the original. Last Assessment & Plan: Formatting of this note might be different from the original. Assessment: Secondary to dog bite PLAN: - See plan for dog bite Last Assessment & Plan: Formatting of this note might be different from the original. Assessment: Secondary to dog bite PLAN: - See plan for dog bite    Electrolyte disorder 07/23/2019    Homicidal thoughts 11/04/2023    Methamphetamine intoxication (Multi) 07/24/2019    Polysubstance dependence, non-opioid, in remission (Multi) 04/15/2022    Formatting of this note might be different from the original. Dx 2014 with amphetamine, cannabis, alcohol, and hallucinogen mixed abuse/dependence Last Assessment & Plan: Formatting of this note might be different from the original. A: active severe problem, with unclear dependency/severity. 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 Last Assessment & Plan: Formatting of this note might be different from the original. Assessment: - Chronically elevated platelet count >400k since 9/30/2021 - currently not on any medications PLAN: - start aspirin 81 mg daily Last Assessment & Plan: Formatting of this note might be different from the original. Assessment: - Chronically       Surgical History  No past surgical history on file.     Social History  He reports that he has been smoking cigarettes. He has a 20 pack-year smoking history. He has never used smokeless tobacco. He reports current alcohol use. He reports current drug use. Drugs: Methamphetamines, Cocaine, and Marijuana.    Family  "History  No family history on file.     Allergies  Patient has no known allergies.    Review of Systems   Constitutional:  Negative for appetite change, fatigue and fever.   HENT:  Negative for congestion and rhinorrhea.    Respiratory:  Negative for shortness of breath.    Cardiovascular:  Negative for chest pain.   Gastrointestinal:  Negative for abdominal distention, abdominal pain, constipation, diarrhea, nausea and vomiting.   Genitourinary:  Negative for dysuria and urgency.   Neurological:  Negative for dizziness, light-headedness and numbness.   All other systems reviewed and are negative.       Physical Exam  Vitals reviewed.   Constitutional:       Appearance: Normal appearance.   HENT:      Head: Normocephalic and atraumatic.      Nose: Nose normal.      Mouth/Throat:      Mouth: Mucous membranes are moist.   Eyes:      Extraocular Movements: Extraocular movements intact.      Conjunctiva/sclera: Conjunctivae normal.   Cardiovascular:      Rate and Rhythm: Normal rate and regular rhythm.   Pulmonary:      Effort: Pulmonary effort is normal.      Breath sounds: Normal breath sounds. No wheezing, rhonchi or rales.   Abdominal:      General: Bowel sounds are normal.      Palpations: Abdomen is soft.      Tenderness: There is no abdominal tenderness.   Musculoskeletal:         General: Normal range of motion.      Cervical back: Normal range of motion and neck supple.   Skin:     General: Skin is warm and dry.      Capillary Refill: Capillary refill takes less than 2 seconds.   Neurological:      General: No focal deficit present.      Mental Status: He is alert and oriented to person, place, and time.   Psychiatric:         Mood and Affect: Mood normal.         Behavior: Behavior normal.          Last Recorded Vitals  Blood pressure 113/72, pulse 76, temperature 37 °C (98.6 °F), temperature source Oral, resp. rate 14, height 1.854 m (6' 1\"), weight 104 kg (230 lb), SpO2 96%.    Relevant Results  Lab Results "   Component Value Date    GLUCOSE 116 (H) 03/06/2025    CALCIUM 9.2 03/06/2025     (L) 03/06/2025    K 3.3 (L) 03/06/2025    CO2 22 03/06/2025    CL 98 03/06/2025    BUN 21 03/06/2025    CREATININE 1.36 (H) 03/06/2025      Lab Results   Component Value Date    WBC 12.1 (H) 03/06/2025    HGB 12.7 (L) 03/06/2025    HCT 37.3 (L) 03/06/2025    MCV 81 03/06/2025     (H) 03/06/2025          Assessment/Plan   Assessment & Plan  Non-traumatic rhabdomyolysis      Nontraumatic rhabdomyolysis  CK 7793, repeat 6275  Given 1 L of fluids in the ED  Will continue IV fluids  Monitor renal function  Repeat CK now    Suicidal and homicidal ideation  Suicide and homicide precautions  Sitter at bedside  Consult psych, appreciate recommendations  Will need medically cleared for inpatient psych    EDUARDO  BUN/creatinine 21/1.36, baseline is around 1  Continue IV fluids  Avoid nephrotoxic medications, renally dose meds  BMP in the morning    Hypokalemia  Potassium 3.3, replaced  Repeat in the morning    Transaminitis  ALT//171, repeat  Check ultrasound of the liver and acute hepatitis panel    Polysubstance abuse  Reportedly uses methamphetamines, cocaine, and THC  Reports occasional alcohol use, smokes 5 cigarettes a day  Declines nicotine patch  Counseled on cessation  Monitor for signs and symptoms of alcohol withdrawal  Alcohol level negative, tox screen is pending    Leukocytosis  Mild  UA and chest x-ray pending    Plan  Monitor on telemetry  IV fluids  Check ultrasound of the liver, chest x-ray, tox screen, acute hepatitis panel  Consult psych, appreciate recommendations  DVT prophylaxis: Lovenox  CBC and BMP in the morning  Will need medical clearance for inpatient psych                 Adilia Segovia, APRN-CNP

## 2025-03-07 NOTE — ED PROVIDER NOTES
Patient was initially seen by my colleague and endorsed to me on signout.    Briefly, patient is a 39-year-old male that presented to the emergency department for evaluation of suicidal and homicidal ideation.  Patient was initially seen at [facility] however was sent for medical clearance.  Patient was signed out to me pending admission for patient's rhabdomyolysis as well as mild elevated kidney function.    Exam  General: Resting comfortably, no obvious distress  HEENT: Head is normocephalic, atraumatic, moist membranes  Cardiac: Regular rate and rhythm  Respiratory: Respirations easy, nonlabored  Neurologic: Patient is drowsy from medication at this time however arousable.    Repeat CK ordered at this time and given patient's elevation in kidney function, rhabdomyolysis with significantly elevated CK level patient will be admitted for continued IV hydration and monitoring.     Chay Snider,   03/07/25 0539

## 2025-03-07 NOTE — ED PROVIDER NOTES
"HPI   Chief Complaint   Patient presents with    Psychiatric Evaluation       HPI   the patient is a 39-year-old male coming into the emergency department by EMS for psychosis, psychiatric concerns, says that he feels like he is having some homicidal and suicidal thoughts but not towards anybody specific.   he denies any physical pain but says that he believes he needs to sit with a sitter to \"keep him cool\"  he went to [facility] for help, and they called 9 1 and directed him here.      Patient History   Past Medical History:   Diagnosis Date    Aggressive behavior 07/26/2019    Cannabis use disorder 03/15/2023    Cellulitis 11/01/2021    Formatting of this note might be different from the original. Last Assessment & Plan: Formatting of this note might be different from the original. Assessment: Secondary to dog bite PLAN: - See plan for dog bite Last Assessment & Plan: Formatting of this note might be different from the original. Assessment: Secondary to dog bite PLAN: - See plan for dog bite    Electrolyte disorder 07/23/2019    Homicidal thoughts 11/04/2023    Methamphetamine intoxication (Multi) 07/24/2019    Polysubstance dependence, non-opioid, in remission (Multi) 04/15/2022    Formatting of this note might be different from the original. Dx 2014 with amphetamine, cannabis, alcohol, and hallucinogen mixed abuse/dependence Last Assessment & Plan: Formatting of this note might be different from the original. A: active severe problem, with unclear dependency/severity. 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 Last Assessment & Plan: Formatting of this note might be different from the original. Assessment: - Chronically " elevated platelet count >400k since 9/30/2021 - currently not on any medications PLAN: - start aspirin 81 mg daily Last Assessment & Plan: Formatting of this note might be different from the original. Assessment: - Chronically     No past surgical history on file.  No family history on file.  Social History     Tobacco Use    Smoking status: Every Day     Current packs/day: 1.00     Average packs/day: 1 pack/day for 20.0 years (20.0 ttl pk-yrs)     Types: Cigarettes    Smokeless tobacco: Never   Substance Use Topics    Alcohol use: Yes    Drug use: Yes     Types: Methamphetamines, Cocaine, Marijuana       Physical Exam   ED Triage Vitals   Temp Pulse Resp BP   -- -- -- --      SpO2 Temp src Heart Rate Source Patient Position   -- -- -- --      BP Location FiO2 (%)     -- --       Physical Exam    PHYSICAL EXAMINATION    GENERAL APPEARANCE: Awake and alert.     VITAL SIGNS: As per the nurses' triage record.     HEENT: Normocephalic, atraumatic. Extraocular muscles are intact. Pupils equal round and reactive to light. Conjunctiva are pink. Negative scleral icterus. Mucous membranes are moist. Tongue in the midline. Pharynx was without erythema or exudates, uvula midline    NECK: Soft Nontender and supple, full gross ROM, no meningeal signs.    CHEST: Nontender to palpation. Clear to auscultation bilaterally. No rales, rhonchi, or wheezing.     HEART: S1, S2. Regular rate and rhythm. No murmurs, gallops or rubs.  Strong and equal pulses in the extremities.     ABDOMEN: Soft, nontender, nondistended, positive bowel sounds, no palpable masses.    MUSCULOSKELETAL: The calves are nontender to palpation. Full gross active range of motion. Ambulating on own with no acute difficulties     NEUROLOGICAL: Awake, alert and oriented x 3. Power intact in the upper and lower extremities. Sensation is intact to light touch in the upper and lower extremities.       Psychiatric: verbalizing that he has been having some hallucinations  and delusions and has been having some thoughts of both homicide and suicide.  No specific suicidal plan and no homicidal thoughts towards anybody specific.  Not elaborating on some of these thoughts.    IMMUNOLOGICAL: No lymphatic streaking noted     DERM: No petechiae, rashes, or ecchymoses.    ED Course & MDM   ED Course as of 03/07/25 0041   Thu Mar 06, 2025   2206 Attending physician attempted to medically admit the patient.  The hospitalist did not feel comfortable admitting the patient at this time, stated that he will come down and talk with us in a little while to further discuss. [AP]   Fri Mar 07, 2025   0040 Time of my departure from shift ([facility]) -still awaiting a final disposition plan, hospitalist initially has refused to admit the patient, patient not medically cleared for psychiatric management.  Please refer to attending physician note for final disposition plan of this patient.  Handed off to attending at time Dr. Snider [AP]      ED Course User Index  [AP] Sg Bolivar, PAEdwinaC         Diagnoses as of 03/07/25 0041   Polysubstance abuse (Multi)   Psychosis, unspecified psychosis type (Multi)   Suicidal thoughts   Homicidal ideation   Tobacco abuse   Acute renal failure due to rhabdomyolysis (CMS-HCC)   Non-traumatic rhabdomyolysis   Transaminitis                 No data recorded     [place] Coma Scale Score: 15 (03/06/25 1955 : Anjel Pathak RN)                           Medical Decision Making    Parts of this chart have been completed using voice recognition software. Please excuse any errors of transcription.  My thought process and reason for plan has been formulated from the time that I saw the patient until the time of disposition and is not specific to one specific moment during their visit and furthermore my MDM encompasses this entire chart and not only this text box.      HPI: Detailed above.    Exam: A medically appropriate exam performed, outlined above, given the known  history and presentation.    History Limited by:  nothing    History obtained from:  patient    External/internal records reviewed:  reviewed hospitalization record on February 26, 2025 for suicidal ideation     Social Determinants of Health considered during this visit:  lives at home    Chronic conditions impacting care:  psychosis, psychiatric instability     Medications given during visit:  Medications   potassium chloride (Klor-Con) packet 20 mEq (has no administration in time range)   haloperidol lactate (Haldol) injection 5 mg (5 mg intramuscular Given 3/6/25 2148)   diphenhydrAMINE (BENADryl) injection 50 mg (50 mg intramuscular Given 3/6/25 2148)   LORazepam (Ativan) injection 2 mg (2 mg intramuscular Given 3/6/25 2148)   lactated Ringer's bolus 1,000 mL (0 mL intravenous Stopped 3/6/25 2325)   sodium chloride 0.9 % bolus 1,000 mL (0 mL intravenous Stopped 3/6/25 2325)        Diagnostic/tests  Labs Reviewed   CBC WITH AUTO DIFFERENTIAL - Abnormal       Result Value    WBC 12.1 (*)     nRBC 0.0      RBC 4.58      Hemoglobin 12.7 (*)     Hematocrit 37.3 (*)     MCV 81      MCH 27.7      MCHC 34.0      RDW 12.9      Platelets 463 (*)     Neutrophils % 58.8      Immature Granulocytes %, Automated 0.2      Lymphocytes % 28.8      Monocytes % 11.7      Eosinophils % 0.3      Basophils % 0.2      Neutrophils Absolute 7.10      Immature Granulocytes Absolute, Automated 0.03      Lymphocytes Absolute 3.49      Monocytes Absolute 1.41 (*)     Eosinophils Absolute 0.04      Basophils Absolute 0.03     COMPREHENSIVE METABOLIC PANEL - Abnormal    Glucose 116 (*)     Sodium 134 (*)     Potassium 3.3 (*)     Chloride 98      Bicarbonate 22      Anion Gap 17      Urea Nitrogen 21      Creatinine 1.36 (*)     eGFR 69      Calcium 9.2      Albumin 4.6      Alkaline Phosphatase 76      Total Protein 7.7       (*)     Bilirubin, Total 1.5 (*)      (*)    CREATINE KINASE - Abnormal    Creatine Kinase 7,793 (*)     ACUTE TOXICOLOGY PANEL, BLOOD - Normal    Acetaminophen <10.0      Salicylate  <3      Alcohol <10     SARS-COV-2, INFLUENZA A/B AND RSV PCR - Normal    Coronavirus 2019, PCR Not Detected      Flu A Result Not Detected      Flu B Result Not Detected      RSV PCR Not Detected      Narrative:     This assay is an FDA-cleared, in vitro diagnostic nucleic acid amplification test for the qualitative detection and differentiation of SARS CoV-2/ Influenza A/B/ RSV from nasopharyngeal specimens collected from individuals with signs and symptoms of respiratory tract infections, and has been validated for use at [facility]. Negative results do not preclude COVID-19/ Influenza A/B/ RSV infections and should not be used as the sole basis for diagnosis, treatment, or other management decisions. Testing for SARS CoV-2 is recommended only for patients who meet current clinical and/or epidemiological criteria defined by federal, state, or local public health directives.   URINALYSIS WITH REFLEX CULTURE AND MICROSCOPIC    Narrative:     The following orders were created for panel order Urinalysis with Reflex Culture and Microscopic.  Procedure                               Abnormality         Status                     ---------                               -----------         ------                     Urinalysis with Reflex C...[[id number]]                                                 Extra Urine Gray Tube[[id number]]                                                         Please view results for these tests on the individual orders.   DRUG SCREEN,URINE   URINALYSIS WITH REFLEX CULTURE AND MICROSCOPIC   EXTRA URINE GRAY TUBE      No orders to display        Case discussed with: ED attending as well as the hospitalist      Considerations/further MDM:  My differential includes SI, HI, acute psychiatric emergency, patient has been under direct monitoring during his entirety of his ER visit with  sitter.    The patient additionally was found to have a number of metabolic concerns including significant rhabdomyolysis with elevated CK value, EDUARDO, some electrolyte disturbances with transaminitis.  Does not have any abdominal pain, was becoming somewhat aggressive and actually requested medication, medications were given.    IV fluids initiated for the rhabdomyolysis.    Potassium ordered for the electrolyte disturbances.          Procedure  Critical Care    Performed by: Sg Bolivar PA-C  Authorized by: Saranya Anderson MD    Critical care provider statement:     Critical care time (minutes):  33    Critical care time was exclusive of:  Separately billable procedures and treating other patients and teaching time    Critical care was necessary to treat or prevent imminent or life-threatening deterioration of the following conditions:  Metabolic crisis    Critical care was time spent personally by me on the following activities:  Blood draw for specimens, development of treatment plan with patient or surrogate, discussions with consultants, evaluation of patient's response to treatment, examination of patient, ordering and performing treatments and interventions, ordering and review of laboratory studies, pulse oximetry, review of old charts and re-evaluation of patient's condition    Care discussed with: admitting provider    Comments:      As well as aggressive fluid bolusing for management of rhabdomyolysis in the setting of EDUARDO with electrolyte disturbances       Sg Bolivar PA-C  03/07/25 0041

## 2025-03-07 NOTE — PROGRESS NOTES
03/07/25 1203   Physical Activity   On average, how many days per week do you engage in moderate to strenuous exercise (like a brisk walk)? 0 days   On average, how many minutes do you engage in exercise at this level? 0 min   Financial Resource Strain   How hard is it for you to pay for the very basics like food, housing, medical care, and heating? Pt Declined   Housing Stability   In the last 12 months, was there a time when you were not able to pay the mortgage or rent on time? Pt Declined   At any time in the past 12 months, were you homeless or living in a shelter (including now)? Pt Declined   Transportation Needs   In the past 12 months, has lack of transportation kept you from medical appointments or from getting medications? Pt Declined   In the past 12 months, has lack of transportation kept you from meetings, work, or from getting things needed for daily living? Pt Declined   Food Insecurity   Within the past 12 months, you worried that your food would run out before you got the money to buy more. Pt Declined   Within the past 12 months, the food you bought just didn't last and you didn't have money to get more. Pt Declined   Stress   Do you feel stress - tense, restless, nervous, or anxious, or unable to sleep at night because your mind is troubled all the time - these days? Pt Declined   Social Connections   In a typical week, how many times do you talk on the phone with family, friends, or neighbors? Pt Declined   How often do you get together with friends or relatives? Pt Declined   How often do you attend Jain or Yazidism services? Pt Declined   Do you belong to any clubs or organizations such as Jain groups, unions, fraternal or athletic groups, or school groups? Pt Declined   How often do you attend meetings of the clubs or organizations you belong to? Pt Declined   Are you , , , , never , or living with a partner? Pt Declined   Intimate Partner  Violence   Within the last year, have you been afraid of your partner or ex-partner? Pt Declined   Within the last year, have you been humiliated or emotionally abused in other ways by your partner or ex-partner? Pt Declined   Within the last year, have you been kicked, hit, slapped, or otherwise physically hurt by your partner or ex-partner? Pt Declined   Within the last year, have you been raped or forced to have any kind of sexual activity by your partner or ex-partner? Pt Declined   Alcohol Use   Q1: How often do you have a drink containing alcohol? Pt Declined   Q2: How many drinks containing alcohol do you have on a typical day when you are drinking? Pt Declined   Q3: How often do you have six or more drinks on one occasion? Pt Declined   Utilities   In the past 12 months has the electric, gas, oil, or water company threatened to shut off services in your home? Pt Declined   Health Literacy   How often do you need to have someone help you when you read instructions, pamphlets, or other written material from your doctor or pharmacy? Patient declines to respond

## 2025-03-07 NOTE — ED NOTES
Patient belongings in locker 5    Purple sweater, 2x gray socks  Black pants  Black shorts  Black hat  Black shoes  Brown paper bag  Black iphone  Book bag Black  White trash bag  2 packs of cigarettes   Trash bag & holiday bag in front of lockers       Татьяна Hansen, EMT  03/07/25 1011

## 2025-03-07 NOTE — PROGRESS NOTES
"EPAT - Social Work Psychiatric Assessment    Arrival Details  Mode of Arrival: Other (Comment) (walked over from [facility] by police)  Admission Source: Other (Comment) (W)  Admission Type: Voluntary  EPAT Assessment Start Date: 03/06/25  EPAT Assessment Start Time: 2030  Name of : Leidy DICK    History of Present Illness  Admission Reason: Psychosis  HPI: Pt is a 39 y.o. male who originally showed up to [facility] today seeking voluntary admission. W states pt was \"too psychotic and made HI statements\" therefore they requested [place] [employer] to escort him over to the ED for a full work up. Pt is making SI/HI comments in the ED and appears manic and psychotic. Pt has a lengthy psych hx positive or polysubstance abuse, schizophrenia, bipolar, and psychosis. He has had multiple back to back psych admissions with little time between them in community care. Pt was just discharged from [place] [address] unit on 3/3/25 for similar presentation and complaints as today.    SW Readmission Information   Readmission within 30 Days: Yes  Previous ED Visit Date and Reason : 2/24/25 for SI/HI/Psychosis  Previous Discharge Date and Location: 3/3/25 rom [place] [address] unit  Factors Contributing to  Readmission Inpatient/ED (Team Perspective): Homeless, Med Compliance/Difficulty Obtaining, Substance Abuse, Failed to Keep Follow-Up Appointments  Readmission Factors Team Comments: Pt did not make follow up outpatient appointments after discharge, stopped taking his psych meds, and immediately began using again  Readmission From: Home  Where Did You Go When You Left the Hospitals Last Time: Homeless  Was Family/Friend Involved in Your Discharge Plan: No  Did You Feel Ready to Leave the Hospital When You Were Discharged: Yes  Were You Able to Get Your Medications: Yes  Did You Take Your Medications as Prescribed: No  Reasons for Not Taking the Medications: Substance abuse  Did You Have Follow-Up Appointments Scheduled: Yes  Did You " "Keep the Appointment: No  What Prevented You From Going to the Appointments: Substance abuse and non-compliance  When Did You Start Not Feeling Well: Today  Did You Contact Anyone to Tell Them How You Bath: Yes  What Do You Think Would Have Helped to Prevent You From Readmission: Rehab  Did You Get the Care You Bath You Needed From the Facility: Yes  What Could Have Been Better: Pt states he \"needs rehab\"    Psychiatric Symptoms  Anxiety Symptoms: Generalized  Depression Symptoms: No problems reported or observed.  Gely Symptoms: Increased energy, Less need to sleep, Poor judgment, Pressured speech, Psychomotor agitation    Psychosis Symptoms  Hallucination Type: Other (Comment) (internally stimulated)  Delusion Type: Paranoid    Additional Symptoms - Adult  Generalized Anxiety Disorder: Excessive anxiety/worry, Irritability, Restlessness, Sleep disturbance  Obsessive Compulsive Disorder: No problems reported or observed.  Panic Attack: Shortness of breath, Sweaty/clammy  Post Traumatic Stress Disorder: No problems reported or observed.  Delirium: Disorientation, Waxing and waning    Past Psychiatric History/Meds/Treatments  Past Psychiatric History: Per chart review pt has a documented hx of psychosis, polysubstance abuse, schizophrenia, and bipolar.  Past Psychiatric Meds/Treatments: Pt has a lengthy psych hx. [facility] states he has admitted to their facility about a dozen times in the last 2 years. Pt also has multiple psych admissions to [facility], [facility], and [facility] system. Pt's psych admissions are often back to back due to pt noncompliance with outpatient care and continued substance abuse. Pt was recently discharged from [facility] for SI/HI/psychosis on 3/3/25.  Past Violence/Victimization History: Pt has a docuemnted hx of getting agitated and hostile in the ED and needing PRN meds    Support System: Friends, Immediate family    Living Arrangement: Homeless    Income Information  Employment Status " for: Patient  Employment Status: Unemployed, Disabled  Income Source: [employer] Service/Education History  Current or Previous  Service: None    Drug Screening  Have you used any substances (canabis, cocaine, heroin, hallucinogens, inhalants, etc.) in the past 12 months?: Yes  Have you used any prescription drugs other than prescribed in the past 12 months?: No  Is a toxicology screen needed?: Yes    Stage of Change  Stage of Change: Action  History of Treatment: Inpatient, Dual, Sober living  Type of Treatment Offered: Inpatient, IOP  Treatment Offered: Accepted, Resources/education provided  Duration of Substance Use: Many years  Frequency of Substance Use: Daily    Behavioral Health  Behavioral Health(WDL): Exceptions to WDL  Behaviors/Mood: Agitated, Anxious, Appears intoxicated, Affect inconsistent with mood, Aggressive verbally, others, Cooperative, Delusions, Hyper-verbal, Pacing, Restless  Affect: Inconsistent with thought content  Parent/Guardian/Significant Other Involvement: Attentive to patient needs  Visitor Behaviors: Appropriate for situation, Cooperative, Flat affect, Supportive    Orientation  Orientation Level: Oriented X4    General Appearance  Motor Activity: Agitation, Hyperactivity, Restlessness  Speech Pattern: Excessively loud, Pressured, Rapid, Repetitive  General Attitude: Cooperative, Suspicious  Appearance/Hygiene: Body odor    Thought Process  Coherency: Disorganized, Flight of ideas, Tangential  Content: Blaming others, Delusions  Delusions: Paranoid  Perception: Not altered  Hallucination: Other (Comment) (appears internally stimulated)  Judgment/Insight: Impaired  Confusion: Mild  Cognition: Appropriate for developmental age, Impulsive, Poor judgement, Poor safety awareness, Poor attention/concentration    Sleep Pattern  Sleep Pattern: Insomnia    Risk Factors  Self Harm/Suicidal Ideation Plan: Pt states he has SI with no plan/intent  Risk Factors: Lower  socioeconomic status, Homicidal ideation, Major mental illness, Male, Substance abuse, Unemployment    Violence Risk Assessment  Assessment of Violence: In past 6-12 months  Thoughts of Harm to Others: Yes - currently present  Homicidal ideation: Yes - currently present  Current Homicidal Intent: No - not currently/within last 6 months  Current Homicidal Plan: No - not currently/within last 6 months  Access to Homicidal Means: No  Identified Victim: Pt states he doesn't have HI towards anyone in particular    Ability to Assess Risk Screen  Risk Screen - Ability to Assess: Able to be screened  Ask Suicide-Screening Questions  1. In the past few weeks, have you wished you were dead?: Yes  2. In the past few weeks, have you felt that you or your family would be better off if you were dead?: Yes  3. In the past week, have you been having thoughts about killing yourself?: Yes  4. Have you ever tried to kill yourself?: No  5. Are you having thoughts of killing yourself right now?: Yes  Calculated Risk Score: Imminent Risk  [place] Suicide Severity Rating Scale (Screener/Recent Self-Report)  1. Wish to be Dead (Past 1 Month): Yes  2. Non-Specific Active Suicidal Thoughts (Past 1 Month): Yes  3. Active Suicidal Ideation with any Methods (Not Plan) Without Intent to Act (Past 1 Month): Yes  4. Active Suicidal Ideation with Some Intent to Act, Without Specific Plan (Past 1 Month): No  5. Active Suicidal Ideation with Specific Plan and Intent (Past 1 Month): No  6. Suicidal Behavior (Lifetime): Yes  6. Suicidal Behavior (3 Months): Yes  Calculated C-SSRS Risk Score (Lifetime/Recent): High Risk  Step 1: Risk Factors  Current & Past Psychiatric Dx: Mood disorder, Psychotic disorder, Alcohol/substance abuse disorders, Conduct problems (antisocial behavior, aggression, impulsivity)  Presenting Symptoms: Impulsivity, Anxiety and/or panic, Psychosis, Insomia  Precipitants/Stressors: Substance intoxication or withdrawal, Pending  "incarceration or homelessness, Inadequate social supports  Change in Treatment: Recent inpatient discharge, Non-compliant or not receiving treatment  Step 3: Suicidal Ideation Intensity  How Many Times Have You Had These Thoughts:  (Unable to answer due to psychosis)  When You Have the Thoughts How Long do They Last :  (Unable to answer due to psychosis)  Could/Can You Stop Thinking About Killing Yourself or Wanting to Die if You Want to:  (Unable to answer due to psychosis)  Are There Things - Anyone or Anything - That Stopped You From Wanting to Die or Acting on:  (Unable to answer due to psychosis)  What Sort of Reasons Did You Have For Thinking About Wanting to Die or Killing Yourself:  (Unable to answer due to psychosis)  Step 5: Documentation  Risk Level: Moderate suicide risk    Psychiatric Impression and Plan of Care  Assessment and Plan: Pt was A&Ox3 for EPAT assessment, he was mostly cooperative but was very manic, anxious, and paranoid. He is homeless and was malodorous in ED. Pt's eyes were wide open and his speech was pressured, rapid, tangential, and loud. Pt had extreme difficulty answering simple yes or no questions. He would often ramble and say nonsensical things without answering my original question. Pt paced back and forth in his room throughout the assessment and had difficulty sitting still. Pt had a \"friend/advocate\" persent with him. His friend often tried to redirect the pt in the conversation to answer the questions as well and pt still had difficulty staying on topic and answering basic things. Pt appeared internally stimulated but was not able to answer if he was having any AVH. Pt confirms that he feels suicidal and homicidal. He is unable to go into detail about the frequency or nature of his SI due to the psychosis. Pt confirms he does not have a plan though but states he \"doesn't feel safe going home,\" Pt also confirms he doesn't have HI towards anyone specifically. He states he " "could \"hurt anybody that disrespects him.\" Pt displayed some paranoid delusions as well AEB stating \"see I don't like the way that joann out there is looking at me. He looking at me funny like he wants to hurt me.\" Pt made several statements about \"people out there wanting to hurt him.\" Pt admits to using again. He states he is using meth, crack cocaine, and marijuana. He admits he started using when he was discharged from [facility] on 3/3. Pt was supposed to see outpatient psychiatry yesterday and has another appt with [facility] tomorrow. Pt has a lengthy hx of back to back admissions due to noncompliance with psych meds and outpatient care. When I asked pt what is lacking that he needs in order to maintain sobriety and stability in the community he states \"I need rehab. 70 days is the longest I've been sober. I need rehab.\" I suspect there may also be secondary gain of using admissions for housing as well since pt is chronically homeless. Pt's CK level and blood pressure is highly elevated in ED. Spoke with Dr. Snider who states they are seeking a medical admission first due to possible episode of rhabdo.  Specific Resources Provided to Patient: Educated pt on importance with medication and outpatient tx compliance to stay out of the hospital. Also educated him on the importance of avoiding drug use so he does not keep having multiple episodes of rhabdo  PHP/IOP Recommended: Yes  Plan Comments: Pt will likely be referred for inpatient placement after medically cleared for possible rhabdo    Outcome/Disposition  Assessment, Recommendations and Risk Level Reviewed with: ED RN and Dr. Snider  Contact Name: Javi Manuel  Contact Number(s): [phone]  Contact Relationship: Friend  EPAT Assessment Completed Date: 03/06/25  EPAT Assessment Completed Time: 2130    Social Work Note  "

## 2025-03-07 NOTE — CONSULTS
CONSULT: Nephrology SERVICE    SERVICE DATE: 3/7/2025   SERVICE TIME:  11:11 AM    REASON FOR CONSULT: Rhabdomyolysis  REQUESTING PHYSICIAN: Adilia Segovia CNP  PRIMARY CARE PHYSICIAN: No Assigned PCP Generic Provider, MD    ASSESSMENT AND PLAN  39-year-old man with suicidal and homicidal ideation admitted with rhabdomyolysis after cocaine usage.  1.  Acute renal failure  2.  Rhabdomyolysis  3.  Hypokalemia  4.  Hyponatremia  5.  Essential hypertension    Technically he was in mild acute renal failure with a creatinine bump to 1.4 up from his baseline of 1.1.  This is already improved.  The hypokalemia at admission has also improved.  The hyponatremia has improved.  Rhabdomyolysis is improving, but I would speed along the recovery with initiation of normal saline at 250 mL an hour overnight.  His blood pressure was elevated at admission, but is improved.  This may have been from cocaine usage.  Trend daily labs, trend CK level.  Continue supportive care.  Dr. Abrams is available this weekend.  Case discussed with Adilia Segovia CNP.     SUBJECTIVE  Mr. Vaughn is a 39 y.o. man with a history of suicidal ideation, homicidal ideation, polysubstance abuse admitted due to suicidal and homicidal ideation, consulted for rhabdomyolysis.  He has a normal serum creatinine, typically runs around 1.1-1.2.  He has no major electrolyte disturbances.  He would not allow urinalysis, but his CK level has been as high as 7000.  He has been commenced on IV fluids.  He awaits psychiatric evaluation.  He initially presented to [facility] and described suicidal and homicidal ideation and was sent to [facility] for inpatient evaluation.  He denies nausea, vomiting, diarrhea, dysuria.    PAST MEDICAL HISTORY:   Past Medical History:   Diagnosis Date    Aggressive behavior 07/26/2019    Cannabis use disorder 03/15/2023    Cellulitis 11/01/2021    Formatting of this note might be different from the original. Last Assessment & Plan:  Formatting of this note might be different from the original. Assessment: Secondary to dog bite PLAN: - See plan for dog bite Last Assessment & Plan: Formatting of this note might be different from the original. Assessment: Secondary to dog bite PLAN: - See plan for dog bite    Electrolyte disorder 07/23/2019    Homicidal thoughts 11/04/2023    Methamphetamine intoxication (Multi) 07/24/2019    Polysubstance dependence, non-opioid, in remission (Multi) 04/15/2022    Formatting of this note might be different from the original. Dx 2014 with amphetamine, cannabis, alcohol, and hallucinogen mixed abuse/dependence Last Assessment & Plan: Formatting of this note might be different from the original. A: active severe problem, with unclear dependency/severity. 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 Last Assessment & Plan: Formatting of this note might be different from the original. Assessment: - Chronically elevated platelet count >400k since 9/30/2021 - currently not on any medications PLAN: - start aspirin 81 mg daily Last Assessment & Plan: Formatting of this note might be different from the original. Assessment: - Chronically     PAST SURGICAL HISTORY: No past surgical history on file.  FAMILY HISTORY: No family history on file.  SOCIAL HISTORY:   Social History     Tobacco Use    Smoking status: Every Day     Current packs/day: 1.00     Average packs/day: 1 pack/day for 20.0 years (20.0 ttl pk-yrs)     Types: Cigarettes    Smokeless tobacco: Never   Substance Use Topics    Alcohol use: Yes    Drug use: Yes     Types: Methamphetamines, Cocaine, Marijuana     MEDICATIONS:  enoxaparin, 40 mg, subcutaneous, q24h  melatonin, 5 mg, oral,  "Nightly       sodium chloride 0.9%, 250 mL/hr       PRN medications: acetaminophen **OR** acetaminophen, diphenhydrAMINE, haloperidol lactate, hydrOXYzine pamoate, LORazepam, ondansetron **OR** ondansetron, polyethylene glycol   CURRENT ALLERGIES:   Allergies as of 03/06/2025    (No Known Allergies)       COMPLETE REVIEW OF SYSTEMS:    Full ROS was negative unless mentioned above    OBJECTIVE  PHYSICAL EXAM  Heart Rate:  []   Temp:  [37 °C (98.6 °F)-37.9 °C (100.2 °F)]   Resp:  [14-20]   BP: (111-183)/()   Height:  [185.4 cm (6' 1\")]   Weight:  [104 kg (230 lb)]   SpO2:  [93 %-100 %]    Body mass index is 30.34 kg/m².  This is an obese black man who appears in no acute distress  Appropriate affect  No obvious skin rashes  Hearing intact  Phonation intact  Moist mucosa  Normal S1/normal S2  Lungs are clear to auscultation bilaterally  Abdomen is soft, nondistended, nontender, positive bowel sounds  No Cardona catheter in place, no suprapubic tenderness to palpation  No extremity edema  Moves 4 limbs spontaneously  No obvious joint deformities  No lymphadenopathy    DATA:   Labs:  Results for orders placed or performed during the hospital encounter of 03/06/25 (from the past 96 hours)   CBC and Auto Differential   Result Value Ref Range    WBC 12.1 (H) 4.4 - 11.3 x10*3/uL    nRBC 0.0 0.0 - 0.0 /100 WBCs    RBC 4.58 4.50 - 5.90 x10*6/uL    Hemoglobin 12.7 (L) 13.5 - 17.5 g/dL    Hematocrit 37.3 (L) 41.0 - 52.0 %    MCV 81 80 - 100 fL    MCH 27.7 26.0 - 34.0 pg    MCHC 34.0 32.0 - 36.0 g/dL    RDW 12.9 11.5 - 14.5 %    Platelets 463 (H) 150 - 450 x10*3/uL    Neutrophils % 58.8 40.0 - 80.0 %    Immature Granulocytes %, Automated 0.2 0.0 - 0.9 %    Lymphocytes % 28.8 13.0 - 44.0 %    Monocytes % 11.7 2.0 - 10.0 %    Eosinophils % 0.3 0.0 - 6.0 %    Basophils % 0.2 0.0 - 2.0 %    Neutrophils Absolute 7.10 1.20 - 7.70 x10*3/uL    Immature Granulocytes Absolute, Automated 0.03 0.00 - 0.70 x10*3/uL    Lymphocytes " Absolute 3.49 1.20 - 4.80 x10*3/uL    Monocytes Absolute 1.41 (H) 0.10 - 1.00 x10*3/uL    Eosinophils Absolute 0.04 0.00 - 0.70 x10*3/uL    Basophils Absolute 0.03 0.00 - 0.10 x10*3/uL   Comprehensive metabolic panel   Result Value Ref Range    Glucose 116 (H) 74 - 99 mg/dL    Sodium 134 (L) 136 - 145 mmol/L    Potassium 3.3 (L) 3.5 - 5.3 mmol/L    Chloride 98 98 - 107 mmol/L    Bicarbonate 22 21 - 32 mmol/L    Anion Gap 17 10 - 20 mmol/L    Urea Nitrogen 21 6 - 23 mg/dL    Creatinine 1.36 (H) 0.50 - 1.30 mg/dL    eGFR 69 >60 mL/min/1.73m*2    Calcium 9.2 8.6 - 10.3 mg/dL    Albumin 4.6 3.4 - 5.0 g/dL    Alkaline Phosphatase 76 33 - 120 U/L    Total Protein 7.7 6.4 - 8.2 g/dL     (H) 9 - 39 U/L    Bilirubin, Total 1.5 (H) 0.0 - 1.2 mg/dL     (H) 10 - 52 U/L   Acute Toxicology Panel, Blood   Result Value Ref Range    Acetaminophen <10.0 10.0 - 30.0 ug/mL    Salicylate  <3 4 - 20 mg/dL    Alcohol <10 <=10 mg/dL   Creatine Kinase   Result Value Ref Range    Creatine Kinase 7,793 (H) 0 - 325 U/L   ECG 12 lead   Result Value Ref Range    Ventricular Rate 115 BPM    Atrial Rate 115 BPM    IA Interval 144 ms    QRS Duration 74 ms    QT Interval 356 ms    QTC Calculation(Bazett) 492 ms    P Axis 24 degrees    R Axis 60 degrees    T Axis 48 degrees    QRS Count 19 beats    Q Onset 220 ms    T Offset 398 ms    QTC Fredericia 442 ms   Sars-CoV-2, Influenza A/B and RSV PCR   Result Value Ref Range    Coronavirus 2019, PCR Not Detected Not Detected    Flu A Result Not Detected Not Detected    Flu B Result Not Detected Not Detected    RSV PCR Not Detected Not Detected   Creatine Kinase   Result Value Ref Range    Creatine Kinase 6,275 (H) 0 - 325 U/L   Magnesium   Result Value Ref Range    Magnesium 2.39 1.60 - 2.40 mg/dL   Comprehensive metabolic panel   Result Value Ref Range    Glucose 86 74 - 99 mg/dL    Sodium 136 136 - 145 mmol/L    Potassium 3.7 3.5 - 5.3 mmol/L    Chloride 103 98 - 107 mmol/L    Bicarbonate  24 21 - 32 mmol/L    Anion Gap 13 10 - 20 mmol/L    Urea Nitrogen 19 6 - 23 mg/dL    Creatinine 1.20 0.50 - 1.30 mg/dL    eGFR 80 >60 mL/min/1.73m*2    Calcium 8.4 (L) 8.6 - 10.3 mg/dL    Albumin 3.9 3.4 - 5.0 g/dL    Alkaline Phosphatase 66 33 - 120 U/L    Total Protein 7.2 6.4 - 8.2 g/dL     (H) 9 - 39 U/L    Bilirubin, Total 1.4 (H) 0.0 - 1.2 mg/dL     (H) 10 - 52 U/L   CBC   Result Value Ref Range    WBC 6.8 4.4 - 11.3 x10*3/uL    nRBC 0.0 0.0 - 0.0 /100 WBCs    RBC 4.49 (L) 4.50 - 5.90 x10*6/uL    Hemoglobin 12.5 (L) 13.5 - 17.5 g/dL    Hematocrit 36.7 (L) 41.0 - 52.0 %    MCV 82 80 - 100 fL    MCH 27.8 26.0 - 34.0 pg    MCHC 34.1 32.0 - 36.0 g/dL    RDW 12.7 11.5 - 14.5 %    Platelets 408 150 - 450 x10*3/uL   Creatine Kinase   Result Value Ref Range    Creatine Kinase 5,343 (H) 0 - 325 U/L       SIGNATURE: Leo Mcgovern MD PATIENT NAME: Nathan Vaughn   DATE: March 7, 2025 MRN: [id number]   TIME: 11:11 AM PAGER: [phone]

## 2025-03-07 NOTE — CARE PLAN
The patient's goals for the shift include rest bathe    The clinical goals for the shift include mental stability, safe from self harm    Problem: Risk for Suicide  Goal: Accepts medications as prescribed/needed this shift  Outcome: Progressing  Goal: Identifies supports this shift  Outcome: Progressing  Goal: Makes needs known through verbalization or behaviors this shift  Outcome: Progressing  Goal: No self harm this shift  Outcome: Progressing  Goal: Read Safety Guidelines this shift  Outcome: Progressing  Goal: Complete Mental Health Safety Plan (psychiatry only) this shift  Outcome: Progressing

## 2025-03-07 NOTE — CONSULTS
Consults    Reason For Consult  Elevated LFTs    History Of Present Illness  Nathan Vaughn is a 39 y.o. male presenting with SI and HI. Hx polysubstance abuse. Found to be rhabdo per ED with CK 7000. He denies abdominal pain, nausea, vomiting. He reports little alcohol use. He does use cocaine. LFTs:   Alk Phos 66 Total Bili 1.4. RUQ US showng fatty liver and 1cm liver lesion. He denies recent antibiotics or supplements      Past Medical History  He has a past medical history of Aggressive behavior (07/26/2019), Cannabis use disorder (03/15/2023), Cellulitis (11/01/2021), Electrolyte disorder (07/23/2019), Homicidal thoughts (11/04/2023), Methamphetamine intoxication (Multi) (07/24/2019), Polysubstance dependence, non-opioid, in remission (Multi) (04/15/2022), Renal disease (07/23/2019), Stimulant use disorder (03/15/2023), Substance-induced psychotic disorder (Multi) (03/13/2023), and Thrombocytosis (11/04/2021).    Surgical History  He has no past surgical history on file.     Social History  He reports that he has been smoking cigarettes. He has a 20 pack-year smoking history. He has never used smokeless tobacco. He reports current alcohol use. He reports current drug use. Drugs: Methamphetamines, Cocaine, and Marijuana.    Family History  No family history on file.     Allergies  Patient has no known allergies.    Review of Systems   Constitutional:  Negative for chills and fever.   Gastrointestinal:  Negative for abdominal pain, constipation, diarrhea, nausea and vomiting.   Psychiatric/Behavioral:  Positive for suicidal ideas.         Physical Exam  Vitals reviewed.   Constitutional:       General: He is awake.      Appearance: Normal appearance.   HENT:      Head: Normocephalic and atraumatic.      Mouth/Throat:      Mouth: Mucous membranes are moist.   Cardiovascular:      Rate and Rhythm: Normal rate and regular rhythm.   Pulmonary:      Effort: Pulmonary effort is normal.      Breath  "sounds: Normal breath sounds.   Abdominal:      General: There is no distension.      Palpations: Abdomen is soft.      Tenderness: There is no abdominal tenderness. There is no guarding.   Musculoskeletal:      Cervical back: Normal range of motion and neck supple.   Skin:     General: Skin is warm and dry.   Neurological:      General: No focal deficit present.      Mental Status: He is alert and oriented to person, place, and time. Mental status is at baseline.   Psychiatric:         Attention and Perception: Attention and perception normal.         Mood and Affect: Mood normal.         Behavior: Behavior normal.          Last Recorded Vitals  Blood pressure 113/72, pulse 76, temperature 37 °C (98.6 °F), temperature source Oral, resp. rate 14, height 1.854 m (6' 1\"), weight 104 kg (230 lb), SpO2 96%.    Relevant Results  Results for orders placed or performed during the hospital encounter of 03/06/25 (from the past 24 hours)   CBC and Auto Differential   Result Value Ref Range    WBC 12.1 (H) 4.4 - 11.3 x10*3/uL    nRBC 0.0 0.0 - 0.0 /100 WBCs    RBC 4.58 4.50 - 5.90 x10*6/uL    Hemoglobin 12.7 (L) 13.5 - 17.5 g/dL    Hematocrit 37.3 (L) 41.0 - 52.0 %    MCV 81 80 - 100 fL    MCH 27.7 26.0 - 34.0 pg    MCHC 34.0 32.0 - 36.0 g/dL    RDW 12.9 11.5 - 14.5 %    Platelets 463 (H) 150 - 450 x10*3/uL    Neutrophils % 58.8 40.0 - 80.0 %    Immature Granulocytes %, Automated 0.2 0.0 - 0.9 %    Lymphocytes % 28.8 13.0 - 44.0 %    Monocytes % 11.7 2.0 - 10.0 %    Eosinophils % 0.3 0.0 - 6.0 %    Basophils % 0.2 0.0 - 2.0 %    Neutrophils Absolute 7.10 1.20 - 7.70 x10*3/uL    Immature Granulocytes Absolute, Automated 0.03 0.00 - 0.70 x10*3/uL    Lymphocytes Absolute 3.49 1.20 - 4.80 x10*3/uL    Monocytes Absolute 1.41 (H) 0.10 - 1.00 x10*3/uL    Eosinophils Absolute 0.04 0.00 - 0.70 x10*3/uL    Basophils Absolute 0.03 0.00 - 0.10 x10*3/uL   Comprehensive metabolic panel   Result Value Ref Range    Glucose 116 (H) 74 - 99 " mg/dL    Sodium 134 (L) 136 - 145 mmol/L    Potassium 3.3 (L) 3.5 - 5.3 mmol/L    Chloride 98 98 - 107 mmol/L    Bicarbonate 22 21 - 32 mmol/L    Anion Gap 17 10 - 20 mmol/L    Urea Nitrogen 21 6 - 23 mg/dL    Creatinine 1.36 (H) 0.50 - 1.30 mg/dL    eGFR 69 >60 mL/min/1.73m*2    Calcium 9.2 8.6 - 10.3 mg/dL    Albumin 4.6 3.4 - 5.0 g/dL    Alkaline Phosphatase 76 33 - 120 U/L    Total Protein 7.7 6.4 - 8.2 g/dL     (H) 9 - 39 U/L    Bilirubin, Total 1.5 (H) 0.0 - 1.2 mg/dL     (H) 10 - 52 U/L   Acute Toxicology Panel, Blood   Result Value Ref Range    Acetaminophen <10.0 10.0 - 30.0 ug/mL    Salicylate  <3 4 - 20 mg/dL    Alcohol <10 <=10 mg/dL   Creatine Kinase   Result Value Ref Range    Creatine Kinase 7,793 (H) 0 - 325 U/L   ECG 12 lead   Result Value Ref Range    Ventricular Rate 115 BPM    Atrial Rate 115 BPM    HI Interval 144 ms    QRS Duration 74 ms    QT Interval 356 ms    QTC Calculation(Bazett) 492 ms    P Axis 24 degrees    R Axis 60 degrees    T Axis 48 degrees    QRS Count 19 beats    Q Onset 220 ms    T Offset 398 ms    QTC Fredericia 442 ms   Sars-CoV-2, Influenza A/B and RSV PCR   Result Value Ref Range    Coronavirus 2019, PCR Not Detected Not Detected    Flu A Result Not Detected Not Detected    Flu B Result Not Detected Not Detected    RSV PCR Not Detected Not Detected   Creatine Kinase   Result Value Ref Range    Creatine Kinase 6,275 (H) 0 - 325 U/L   Magnesium   Result Value Ref Range    Magnesium 2.39 1.60 - 2.40 mg/dL   Comprehensive metabolic panel   Result Value Ref Range    Glucose 86 74 - 99 mg/dL    Sodium 136 136 - 145 mmol/L    Potassium 3.7 3.5 - 5.3 mmol/L    Chloride 103 98 - 107 mmol/L    Bicarbonate 24 21 - 32 mmol/L    Anion Gap 13 10 - 20 mmol/L    Urea Nitrogen 19 6 - 23 mg/dL    Creatinine 1.20 0.50 - 1.30 mg/dL    eGFR 80 >60 mL/min/1.73m*2    Calcium 8.4 (L) 8.6 - 10.3 mg/dL    Albumin 3.9 3.4 - 5.0 g/dL    Alkaline Phosphatase 66 33 - 120 U/L    Total  Protein 7.2 6.4 - 8.2 g/dL     (H) 9 - 39 U/L    Bilirubin, Total 1.4 (H) 0.0 - 1.2 mg/dL     (H) 10 - 52 U/L   CBC   Result Value Ref Range    WBC 6.8 4.4 - 11.3 x10*3/uL    nRBC 0.0 0.0 - 0.0 /100 WBCs    RBC 4.49 (L) 4.50 - 5.90 x10*6/uL    Hemoglobin 12.5 (L) 13.5 - 17.5 g/dL    Hematocrit 36.7 (L) 41.0 - 52.0 %    MCV 82 80 - 100 fL    MCH 27.8 26.0 - 34.0 pg    MCHC 34.1 32.0 - 36.0 g/dL    RDW 12.7 11.5 - 14.5 %    Platelets 408 150 - 450 x10*3/uL   Creatine Kinase   Result Value Ref Range    Creatine Kinase 5,343 (H) 0 - 325 U/L     US abdomen limited liver    Result Date: 3/7/2025  Interpreted By:  Lucy Zheng, STUDY: US ABDOMEN LIMITED LIVER; 9:23 am   INDICATION: Transaminitis   COMPARISON: None.   ACCESSION NUMBER(S): [id number]   ORDERING CLINICIAN: MILA RICHARD   TECHNIQUE: Limited abdominal ultrasound of the right upper quadrant was performed utilizing gray scale imaging.   FINDINGS: The study is limited by the large body habitus of this individual and overlying bowel gas. The patient is also sedated.   Liver: The liver is of normal-size. There is a mild increase in echogenicity of the hepatic parenchyma suggesting mild fatty infiltration of the liver. A 0.9 x 1.1 x 1.3 cm hyperechoic lesion is seen within left hepatic lobe. This could represent hemangioma. Gallbladder: Cholelithiasis is present. There is also sludge seen in the gallbladder lumen. The gallbladder wall is at the upper limits of normal in thickness at 3 mm. Sonographic Kelly's sign: Negative Pancreas: Pancreas is obscured from visualization. CBD: 4 mm   The right kidney is normal in size measuring 10.9 cm in length without hydronephrosis.       Cholelithiasis without cholecystitis or biliary ductal dilatation.   Slight fatty infiltration of the liver.   0.9 x 1.1 x 1.3 cm hyperechoic lesion in left hepatic lobe. This may represent hemangioma. I would recommend MRI of the liver on an outpatient basis for further  assessment.   Unremarkable appearance of right kidney.   Nonvisualization of pancreas.   MACRO: None.   Signed by: Lucy Zheng 3/7/2025 9:36 AM Dictation workstation:   [id number]    XR chest 1 view    Result Date: 3/7/2025  Interpreted By:  Lucy Zheng, STUDY: XR CHEST 1 VIEW 3/7/2025 8:12 am   INDICATION: Signs/Symptoms:Leukocytosis   COMPARISON: 02/08/2020   ACCESSION NUMBER(S): [id number]   ORDERING CLINICIAN: MILA RICHARD   TECHNIQUE: AP semi-erect view of the chest   FINDINGS: The cardiac size is accentuated by the low lung volumes and AP projection. There is crowding of the bronchovascular markings as a result of the low lung volumes. No airspace consolidation or pleural abnormality is identified.       Low lung volumes without acute cardiopulmonary process.   Signed by: Lucy Zheng 3/7/2025 8:25 AM Dictation workstation:   [id number]    ECG 12 lead    Result Date: 3/7/2025  Sinus tachycardia QTcB >= 480 msec Abnormal ECG When compared with ECG of 03-NOV-2024 17:37, No significant change was found    XR chest 2 views    Result Date: 2/24/2025  * * *Final Report* * * DATE OF EXAM: Feb 24 2025  8:43AM   AKX   [id number]  -  XR CHEST 2V FRONTAL/LAT  / ACCESSION #  [id number] PROCEDURE REASON: Shortness of breath      * * * * Physician Interpretation * * * *  EXAMINATION:  CHEST RADIOGRAPH (2 VIEW FRONTAL & LATERAL) CLINICAL HISTORY: Shortness of breath MQ:  [id number] EXAM DATE/TIME:  2/24/2025 8:43 AM COMPARISON:  No relevant prior studies available. RESULT: Lines, tubes, and devices:  None. Lungs and pleura:  No consolidation. No lung mass. No pleural effusion. No pneumothorax. Cardiomediastinal silhouette:  Normal cardiomediastinal silhouette. Bones and soft tissues:  Unremarkable.    IMPRESSION: No acute radiographic abnormality. : SASKIA   Transcribe Date/Time: Feb 24 2025  8:58A Dictated by : SHERIN BERMUDEZ MD This examination was interpreted and the report reviewed and electronically signed by:  SHERIN BERMUDEZ MD on Feb 24 2025  8:58AM  EST        Assessment/Plan     Rhabdo, Elevated LFTs, Drug Abuse   Mixed pattern of injury. Partially related to rhabdo vs alcohol. Need to r/o hepatitis infection given hx drug abuse    -Monitor HFP and INR    -Negative Acetaminophen level. He denies any Tylenol, NSAIDs, antibiotics    -Supportive care and hydration     I spent 30 minutes in the professional and overall care of this patient.

## 2025-03-07 NOTE — CONSULTS
"Inpatient consult to Psychiatry  Consult performed by: DALILA Smart  Consult ordered by: DALILA Gaming  Reason for consult: \"SI and HI\"      PSYCHIATRY CONSULT NOTE      Visit type: Face to face evaluation       HISTORY OF PRESENT ILLNESS:     Nathan Vaughn is a 39 y.o. male with a past psychiatric history of Bipolar DO, Schizophrenia, psychosis, polysubstance abuse, and a past medical history of cellulitis, renal disease, thrombocytosis, homicidal thoughts, and psychosis who presented to the emergency department with complaints of suicidal and homicidal ideation. Patient was seen by EPAT SW in the ED. Per  note,     Pt is a 39 y.o. male who originally showed up to [facility] today seeking voluntary admission. [facility] states pt was \"too psychotic and made HI statements\" therefore they requested [place] [employer] to escort him over to the ED for a full work up. Pt is making SI/HI comments in the ED and appears manic and psychotic. Pt has a lengthy psych hx positive or polysubstance abuse, schizophrenia, bipolar, and psychosis. He has had multiple back to back psych admissions with little time between them in community care. Pt was just discharged from [facility] [id number] unit on 3/3/25 for similar presentation and complaints as today.     Psychiatric Impression and Plan of Care  Assessment and Plan: Pt was A&Ox3 for EPAT assessment, he was mostly cooperative but was very manic, anxious, and paranoid. He is homeless and was malodorous in ED. Pt's eyes were wide open and his speech was pressured, rapid, tangential, and loud. Pt had extreme difficulty answering simple yes or no questions. He would often ramble and say nonsensical things without answering my original question. Pt paced back and forth in his room throughout the assessment and had difficulty sitting still. Pt had a \"friend/advocate\" persent with him. His friend often tried to redirect the pt in the conversation to answer the " "questions as well and pt still had difficulty staying on topic and answering basic things. Pt appeared internally stimulated but was not able to answer if he was having any AVH. Pt confirms that he feels suicidal and homicidal. He is unable to go into detail about the frequency or nature of his SI due to the psychosis. Pt confirms he does not have a plan though but states he \"doesn't feel safe going home,\" Pt also confirms he doesn't have HI towards anyone specifically. He states he could \"hurt anybody that disrespects him.\" Pt displayed some paranoid delusions as well AEB stating \"see I don't like the way that joann out there is looking at me. He looking at me funny like he wants to hurt me.\" Pt made several statements about \"people out there wanting to hurt him.\" Pt admits to using again. He states he is using meth, crack cocaine, and marijuana. He admits he started using when he was discharged from [facility] on 3/3. Pt was supposed to see outpatient psychiatry yesterday and has another appt with [facility] tomorrow. Pt has a lengthy hx of back to back admissions due to noncompliance with psych meds and outpatient care. When I asked pt what is lacking that he needs in order to maintain sobriety and stability in the community he states \"I need rehab. 70 days is the longest I've been sober. I need rehab.\" I suspect there may also be secondary gain of using admissions for housing as well since pt is chronically homeless. Pt's CK level and blood pressure is highly elevated in ED.     Patient’s case was discussed with nursing staff. Nursing reported that patient is calm today. However, he received Haldol, Ativan, and Benadryl IM last night. He was cooperative and did not require any restraint for medication administration.  Patient was seen in his assigned room, appeared sleep, a sitter at the bedside. He opened his eyes upon hearing his name. When asked about the reason for his hospital visit, patient reported experiencing " "suicidal and homicidal ideation. He described having suicidal thoughts with a plan to \"run into moving traffic.\" He also endorsed homicidal ideation, though not directed toward a specific individual, describing it as a general feeling. Patient reported that he was diagnosed with bipolar disorder, schizophrenia, and psychosis in the past. Patient mentioned that he has a history of suicidal behavior, including a past suicide attempt a few years ago when he cut his wrist, leading to hospitalization at [facility]. He reports ongoing substance use, including methamphetamine, cocaine, and THC. Patient reported that he was recently hospitalized for psychiatric concerns a week ago at [facility]. Patient mentioned that he a history of anxiety and states that he takes Vistaril for anxiety management. Patient mentioned that he was previously prescribed Prozac, vistaril, and Zyprexa for mental health treatment but has not been taking them consistently.  He reports adequate appetite and sleep. Patient mentioned that he is homeless and does not currently have an outpatient mental health provider. Patient expressed interest in going to inpatient for his mental health illnesses and substance use treatment.      Past Psychiatric History  Current/Previous Diagnoses:  bipolar disorder, schizophrenia, and psychosis.  Current Psychiatrist/Provider: Denies  Current Therapist: Denies  Other Providers / Agencies: Denies  Outpatient Treatment History:  Prozac, vistaril, and Zyprexa  Past Medication Trials:  Prozac, vistaril, and Zyprexa, wellbutrin, Risperdal  Inpatient Hospitalizations:  \"many\"  Suicide Attempts:  per patient, a few years ago when he cut his wrist, leading to hospitalization at [facility].   Homicide attempts/Violence: Denies   Self Harm/Self Injurious:  per patient, a few years ago when he cut his wrist, leading to hospitalization at [facility]. Currently, patient is expressing " "SI/HI    Substance Abuse History  Tobacco use history:  \"5-6 cigarette\"  Alcohol use history:  \"Ocassionally\"  Cannabis use history:  \"yes\" Patient did not elaborate further  Illicit Drug Use History:  \"not sure if they are legal\" Patient did not elaborate further    Social History  He reports that he has been smoking cigarettes. He has a 20 pack-year smoking history. He has never used smokeless tobacco. He reports current alcohol use. He reports current drug use. Drugs: Methamphetamines, Cocaine, and Marijuana.  Household: lives \"homeless\"  Legal hx:  \"minor legal offenses\" patient did not provide specific details  History of trauma/abuse: Denies  Weapons at home and access to lethal means: Denies    OARRS REVIEW  OARRS checked: yes   OARRS comments: score 000    Past Medical History  He has a past medical history of Aggressive behavior (07/26/2019), Cannabis use disorder (03/15/2023), Cellulitis (11/01/2021), Electrolyte disorder (07/23/2019), Homicidal thoughts (11/04/2023), Methamphetamine intoxication (Multi) (07/24/2019), Polysubstance dependence, non-opioid, in remission (Multi) (04/15/2022), Renal disease (07/23/2019), Stimulant use disorder (03/15/2023), Substance-induced psychotic disorder (Multi) (03/13/2023), and Thrombocytosis (11/04/2021).      ALLERGIES  Patient has no known allergies.    Surgical History  He has no past surgical history on file.    FAMILY HISTORY  No family history on file.   Psychiatric History: none reported by patient      PSYCHIATRIC REVIEW OF SYSTEMS  Depression: sleep disturbance: difficulty falling asleep and frequent awakening and markedly diminished interest or pleasure in all or most activities  Anxiety: restlessness or feeling keyed up or on edge, difficulty concentrating, and irritability  Gely:  reportedly, patient had rapid/pressured speech last night  Psychosis: homicidal ideation  Delirium: negative   Trauma: negative    OBJECTIVE:     VITALS      11/3/2024     4:16 " "PM 11/3/2024     8:42 PM 11/4/2024     4:33 AM 3/6/2025     7:40 PM 3/6/2025    10:31 PM 3/7/2025     5:29 AM 3/7/2025     7:45 AM   Vitals   Systolic  103 106 183 127 111 113   Diastolic  67 70 104 81 77 72   Heart Rate  87 98 117 92 86 76   Temp    37.9 °C (100.2 °F)  37 °C (98.6 °F)    Resp  16 18 20 19 19 14   Height 1.778 m (5' 10\")   1.854 m (6' 1\")      Weight (lb) 178   230      BMI 25.54 kg/m2   30.34 kg/m2      BSA (m2) 2 m2   2.31 m2         Body mass index is 30.34 kg/m².  Facility age limit for growth %eddy is 20 years.  Wt Readings from Last 4 Encounters:   03/06/25 104 kg (230 lb)   11/03/24 80.7 kg (178 lb)   10/04/24 80.7 kg (178 lb)   09/12/24 99.8 kg (220 lb)       Mental Status Exam  General: 39 years old male, lying in bed comfortably during interview.  Appearance: Appeared as age stated; fairly dressed/groomed.  Attitude: cooperative, calm during encounter  Behavior: Fair EC; overall responding appropriately  Motor Activity: No notable natasha PMAR  Speech: Clear, with fair phonation, and no lisp nor dysarthria.   Mood: \"ok now\"  Affect: Restricted and guarded; decreased range/intensity, but overall appropriate and congruent  Thought Content:  Endorsed SI/HI.  He described having suicidal thoughts with a plan to \"run into moving traffic.\" He also endorsed homicidal ideation, though not directed toward a specific individual, describing it as a general feeling.   Thought Perception: Did not appear to be responding to internal stimuli. Not endorsing AVH-during interview  Cognition: Grossly intact; A&O x4/4 to self, place, date, and context.  Insight: Limited  Judgement: Fair        CURRENT MEDICATIONS  Scheduled medications  enoxaparin, 40 mg, subcutaneous, q24h  melatonin, 5 mg, oral, Nightly  potassium chloride, 20 mEq, intravenous, Once        Continuous medications  sodium chloride 0.9%, 100 mL/hr, Last Rate: Stopped (03/07/25 0901)  sodium chloride 0.9%, 125 mL/hr, Last Rate: 125 mL/hr " (03/07/25 0901)        PRN medications  PRN medications: acetaminophen **OR** acetaminophen, diphenhydrAMINE, haloperidol lactate, hydrOXYzine pamoate, LORazepam, ondansetron **OR** ondansetron, polyethylene glycol     LABS  Admission on 03/06/2025   Component Date Value Ref Range Status    Ventricular Rate 03/06/2025 115  BPM Preliminary    Atrial Rate 03/06/2025 115  BPM Preliminary    NH Interval 03/06/2025 144  ms Preliminary    QRS Duration 03/06/2025 74  ms Preliminary    QT Interval 03/06/2025 356  ms Preliminary    QTC Calculation(Bazett) 03/06/2025 492  ms Preliminary    P Rew 03/06/2025 24  degrees Preliminary    R Axis 03/06/2025 60  degrees Preliminary    T Rew 03/06/2025 48  degrees Preliminary    QRS Count 03/06/2025 19  beats Preliminary    Q Onset 03/06/2025 220  ms Preliminary    T Offset 03/06/2025 398  ms Preliminary    QTC Fredericia 03/06/2025 442  ms Preliminary    WBC 03/06/2025 12.1 (H)  4.4 - 11.3 x10*3/uL Final    nRBC 03/06/2025 0.0  0.0 - 0.0 /100 WBCs Final    RBC 03/06/2025 4.58  4.50 - 5.90 x10*6/uL Final    Hemoglobin 03/06/2025 12.7 (L)  13.5 - 17.5 g/dL Final    Hematocrit 03/06/2025 37.3 (L)  41.0 - 52.0 % Final    MCV 03/06/2025 81  80 - 100 fL Final    MCH 03/06/2025 27.7  26.0 - 34.0 pg Final    MCHC 03/06/2025 34.0  32.0 - 36.0 g/dL Final    RDW 03/06/2025 12.9  11.5 - 14.5 % Final    Platelets 03/06/2025 463 (H)  150 - 450 x10*3/uL Final    Neutrophils % 03/06/2025 58.8  40.0 - 80.0 % Final    Immature Granulocytes %, Automated 03/06/2025 0.2  0.0 - 0.9 % Final    Immature Granulocyte Count (IG) includes promyelocytes, myelocytes and metamyelocytes but does not include bands. Percent differential counts (%) should be interpreted in the context of the absolute cell counts (cells/UL).    Lymphocytes % 03/06/2025 28.8  13.0 - 44.0 % Final    Monocytes % 03/06/2025 11.7  2.0 - 10.0 % Final    Eosinophils % 03/06/2025 0.3  0.0 - 6.0 % Final    Basophils % 03/06/2025 0.2  0.0 -  2.0 % Final    Neutrophils Absolute 03/06/2025 7.10  1.20 - 7.70 x10*3/uL Final    Percent differential counts (%) should be interpreted in the context of the absolute cell counts (cells/uL).    Immature Granulocytes Absolute, Au* 03/06/2025 0.03  0.00 - 0.70 x10*3/uL Final    Lymphocytes Absolute 03/06/2025 3.49  1.20 - 4.80 x10*3/uL Final    Monocytes Absolute 03/06/2025 1.41 (H)  0.10 - 1.00 x10*3/uL Final    Eosinophils Absolute 03/06/2025 0.04  0.00 - 0.70 x10*3/uL Final    Basophils Absolute 03/06/2025 0.03  0.00 - 0.10 x10*3/uL Final    Glucose 03/06/2025 116 (H)  74 - 99 mg/dL Final    Sodium 03/06/2025 134 (L)  136 - 145 mmol/L Final    Potassium 03/06/2025 3.3 (L)  3.5 - 5.3 mmol/L Final    Chloride 03/06/2025 98  98 - 107 mmol/L Final    Bicarbonate 03/06/2025 22  21 - 32 mmol/L Final    Anion Gap 03/06/2025 17  10 - 20 mmol/L Final    Urea Nitrogen 03/06/2025 21  6 - 23 mg/dL Final    Creatinine 03/06/2025 1.36 (H)  0.50 - 1.30 mg/dL Final    eGFR 03/06/2025 69  >60 mL/min/1.73m*2 Final    Calculations of estimated GFR are performed using the 2021 CKD-EPI Study Refit equation without the race variable for the IDMS-Traceable creatinine methods.  https://jasn.asnjournals.org/content/early/2021/09/22/ASN.0393566616    Calcium 03/06/2025 9.2  8.6 - 10.3 mg/dL Final    Albumin 03/06/2025 4.6  3.4 - 5.0 g/dL Final    Alkaline Phosphatase 03/06/2025 76  33 - 120 U/L Final    Total Protein 03/06/2025 7.7  6.4 - 8.2 g/dL Final    AST 03/06/2025 171 (H)  9 - 39 U/L Final    Bilirubin, Total 03/06/2025 1.5 (H)  0.0 - 1.2 mg/dL Final    ALT 03/06/2025 119 (H)  10 - 52 U/L Final    Patients treated with Sulfasalazine may generate falsely decreased results for ALT.    Acetaminophen 03/06/2025 <10.0  10.0 - 30.0 ug/mL Final    Salicylate  03/06/2025 <3  4 - 20 mg/dL Final    Alcohol 03/06/2025 <10  <=10 mg/dL Final    Creatine Kinase 03/06/2025 7,793 (H)  0 - 325 U/L Final    Coronavirus 2019, PCR 03/06/2025 Not  Detected  Not Detected Final    Flu A Result 03/06/2025 Not Detected  Not Detected Final    Flu B Result 03/06/2025 Not Detected  Not Detected Final    RSV PCR 03/06/2025 Not Detected  Not Detected Final    Creatine Kinase 03/07/2025 6,275 (H)  0 - 325 U/L Final    Magnesium 03/07/2025 2.39  1.60 - 2.40 mg/dL Final    Glucose 03/07/2025 86  74 - 99 mg/dL Final    Sodium 03/07/2025 136  136 - 145 mmol/L Final    Potassium 03/07/2025 3.7  3.5 - 5.3 mmol/L Final    Chloride 03/07/2025 103  98 - 107 mmol/L Final    Bicarbonate 03/07/2025 24  21 - 32 mmol/L Final    Anion Gap 03/07/2025 13  10 - 20 mmol/L Final    Urea Nitrogen 03/07/2025 19  6 - 23 mg/dL Final    Creatinine 03/07/2025 1.20  0.50 - 1.30 mg/dL Final    eGFR 03/07/2025 80  >60 mL/min/1.73m*2 Final    Calculations of estimated GFR are performed using the 2021 CKD-EPI Study Refit equation without the race variable for the IDMS-Traceable creatinine methods.  https://jasn.asnjournals.org/content/early/2021/09/22/ASN.3101482518    Calcium 03/07/2025 8.4 (L)  8.6 - 10.3 mg/dL Final    Albumin 03/07/2025 3.9  3.4 - 5.0 g/dL Final    Alkaline Phosphatase 03/07/2025 66  33 - 120 U/L Final    Total Protein 03/07/2025 7.2  6.4 - 8.2 g/dL Final    AST 03/07/2025 138 (H)  9 - 39 U/L Final    Bilirubin, Total 03/07/2025 1.4 (H)  0.0 - 1.2 mg/dL Final    ALT 03/07/2025 101 (H)  10 - 52 U/L Final    Patients treated with Sulfasalazine may generate falsely decreased results for ALT.    WBC 03/07/2025 6.8  4.4 - 11.3 x10*3/uL Final    nRBC 03/07/2025 0.0  0.0 - 0.0 /100 WBCs Final    RBC 03/07/2025 4.49 (L)  4.50 - 5.90 x10*6/uL Final    Hemoglobin 03/07/2025 12.5 (L)  13.5 - 17.5 g/dL Final    Hematocrit 03/07/2025 36.7 (L)  41.0 - 52.0 % Final    MCV 03/07/2025 82  80 - 100 fL Final    MCH 03/07/2025 27.8  26.0 - 34.0 pg Final    MCHC 03/07/2025 34.1  32.0 - 36.0 g/dL Final    RDW 03/07/2025 12.7  11.5 - 14.5 % Final    Platelets 03/07/2025 408  150 - 450 x10*3/uL Final     Creatine Kinase 03/07/2025 5,343 (H)  0 - 325 U/L Final   Pertinent labs were reviewed    IMAGING      ASSESSMENT:     PSYCHIATRIC RISK ASSESSMENT  Violence Risk Factors:  male, current psychiatric illness, past history of violence, substance abuse , unemployed, violent or homicidal ideation, and stress/destabilizers  Acute Risk of Harm to Others is Considered: Moderate  Suicide Risk Factors: male, prior suicide attempts , lives alone or lack of social support, current psychiatric illness, substance abuse , lack of treatment access, discontinuities in treatment, or recent discharge from hospital, and nonadherence to medication treatment for psychosis   Protective Factors: social support/connectedness  Acute Risk of Harm to Self is Considered: High    DIAGNOSIS  Assessment & Plan  Non-traumatic rhabdomyolysis    Per patient:  Psychosis   Bipolar   Schizophrenia   Anxiety    PLAN/ RECOMMENDATIONS:     -Suicidal ideation/Homicidal ideation: Continue sitter. Continue to assess    -Start Haldol 5 mg po/IM every 6 hours prn for agitation- Recommend daily EKG monitoring (Qtc 492)  -Start Benadryl 50 mg po/IM every 6 hours for agitation  -Continue Ativan 0.5 mg IV every 4 hours prn for agitation (started by primary team)  -Start Prozac 20 mg po daily, goal to increase to 40 mg as tolerated (PTA dose)  -Start Zyprexa 15 mg po nightly  -Increase Vistaril to 50 mg every 6 hours prn for anxiety  -Changed Melatonin to 3 mg daily to help regulate sleep/wake cycle    - Patient does currently meet criteria for inpatient psychiatric admission. Once patient is deemed medically cleared, please document in note that patient is MEDICALLY CLEARED and contact EPAT for referral by placing consult for EPAT and calling [phone] ([facility]).       - Patient lacks the capacity to leave AMA at this time and thus cannot leave AMA. Call [facility] if patient attempts to leave AMA.    - Patient does require a 1:1 sitter from a psychiatric  perspective at this time.    -Thank you for allowing us to participate in the care of this patient.  Psychiatry will continue to follow.    I personally spent 79 minutes today providing care for this patient, including preparation, face to face time, documentation and other services such as review of medical records, diagnostic result, patient education, counseling, coordination of care as specified in the encounter.

## 2025-03-07 NOTE — ED TRIAGE NOTES
Patient arrived to ED from [facility] (pinked) for endorsing SI. Patient is manic with rapid pressured speech that is not congruent with anything concrete. Patient not mentally stable at this time. Needs medical clearance.

## 2025-03-07 NOTE — PROGRESS NOTES
03/07/25 1205   Discharge Planning   Living Arrangements Other (Comment)  (Pt homeless. Address per facesheet is mailing address only. Pt does not give permission for this CM to contact the family members listed on his facesheet)   Support Systems Other (Comment)  (none identified)   Assistance Needed Independent in adls and iadls; pt is requesting inpatient psych placement. Psych consult ordered   Type of Residence Homeless   Do you have animals or pets at home? No   Who is requesting discharge planning? Provider   Home or Post Acute Services Post acute facilities (Rehab/SNF/etc)   Type of Post Acute Facility Services Rehab  (substance abuse/mental health admission is expected)   Expected Discharge Disposition Psych   Does the patient need discharge transport arranged? Yes   RoundTrip coordination needed? Yes   Has discharge transport been arranged? No   What day is the transport expected?   (TBD; Rhabdo must clear and patient must be medically clear before transition to inpatient psych.)     Cm met with patient at bedside. Pt not amenable to extensive admission interview. Pt did not give permission for this CM to reach out to the emergency contacts on the facesheet. Details of facesheet confirmed. Address per facesheet is mailing address only. No PCP. No identified pharmacy.     Dispo: medical clearance then admission to inpatient psych once rhabdo is cleared.  NICOLE: unknown  Discharge plan NOT finalized. Please contact [facility] prior to attempting to discharge this patient. Thank you.

## 2025-03-07 NOTE — PROGRESS NOTES
Pharmacy Medication History Review    Nathan Vaughn is a 39 y.o. male admitted for Non-traumatic rhabdomyolysis. Pharmacy reviewed the patient's [id number] medications and allergies for accuracy.    Medications ADDED:  Bupropion 150 XL  Medications CHANGED:  Aripiprazole 10mg- not taking  Fluoxetine 10mg - not taking  Fluoxetine 40mg  Hydroxyzine 50mg HCL - not taking  Hydroxyzine 25mg Pamoate - 1-2 TID  Olanzapine 15mg   Olanzapine 5mg - not taking  Olanzapine 5 disinegrating - not taking  Sertraline 50 - not taking  Sertraline 50 - duplicate - not taking   Medications REMOVED:   none     The list below reflects the updated PTA list. Comments regarding how patient may be taking medications differently can be found in the Admit Orders Activity  Prior to Admission Medications   Prescriptions Last Dose Informant   FLUoxetine (PROzac) 40 mg capsule Unknown Self   Sig: Take 1 capsule (40 mg) by mouth once daily.   OLANZapine (ZyPREXA) 15 mg tablet Unknown Self   Sig: Take 1 tablet (15 mg) by mouth once daily at bedtime.   buPROPion XL (Wellbutrin XL) 150 mg 24 hr tablet Unknown Self   Sig: Take 1 tablet (150 mg) by mouth once daily. Do not crush, chew, or split.   hydrOXYzine pamoate (Vistaril) 25 mg capsule Unknown Self   Sig: Take 1 to 2 capsules by mouth 3 times a day as needed for anxiety.      Facility-Administered Medications: None        The list below reflects the updated allergy list. Please review each documented allergy for additional clarification and justification.  Allergies  Reviewed by Anjel Pathak RN on 3/6/2025   No Known Allergies         Pharmacy has been updated to none.    Sources used to complete the med history include dispense history, PTA medication list, telephone encounter with [facility] (no help), patient interview. Patient is a poor historian.    Below are additional concerns with the patient's PTA list.  Patient very groggy. First time going through list patient just  reports taking all medications with out any confirmation of dosing. Second time patient does confirm some medications and denies others. While some of the denials - Sertraline, hydroxyzine 50mg HCL,  the doses of olanzapine 5 and 10, fluoxetine 10mg  and abilify look historical and dispense history supports him not taking these. His dispense history also shows non-compliance. Patient does confirm by name and strength by offering the strength after I named the medication - Fluoxetine 40mg, hydroxyzine pamoate 25mg, olanzapine 15mg, and bupropion 150mg. Patient also reports not really prefering risperidone 2mg - there is a current prescription for it that is very recent. The only other very recent prescription is the bupropion that he endorses taking.     Chio Silva, Lindy-Adv  Please reach out via [employer] Secure Chat for questions

## 2025-03-07 NOTE — ASSESSMENT & PLAN NOTE
Nontraumatic rhabdomyolysis  CK 7793, repeat 6275  Given 1 L of fluids in the ED  Will continue IV fluids  Monitor renal function  Repeat CK now    Suicidal and homicidal ideation  Suicide and homicide precautions  Sitter at bedside  Consult psych, appreciate recommendations  Will need medically cleared for inpatient psych    EDUARDO  BUN/creatinine 21/1.36, baseline is around 1  Continue IV fluids  Avoid nephrotoxic medications, renally dose meds  BMP in the morning    Hypokalemia  Potassium 3.3, replaced  Repeat in the morning    Transaminitis  ALT//171, repeat  Check ultrasound of the liver and acute hepatitis panel    Polysubstance abuse  Reportedly uses methamphetamines, cocaine, and THC  Reports occasional alcohol use, smokes 5 cigarettes a day  Declines nicotine patch  Counseled on cessation  Monitor for signs and symptoms of alcohol withdrawal  Alcohol level negative, tox screen is pending    Leukocytosis  Mild  UA and chest x-ray pending    Plan  Monitor on telemetry  IV fluids  Check ultrasound of the liver, chest x-ray, tox screen, acute hepatitis panel  Consult psych, appreciate recommendations  DVT prophylaxis: Lovenox  CBC and BMP in the morning  Will need medical clearance for inpatient psych

## 2025-03-08 LAB
ALBUMIN SERPL BCP-MCNC: 3.2 G/DL (ref 3.4–5)
ALBUMIN SERPL BCP-MCNC: 3.3 G/DL (ref 3.4–5)
ALP SERPL-CCNC: 60 U/L (ref 33–120)
ALT SERPL W P-5'-P-CCNC: 73 U/L (ref 10–52)
ANION GAP SERPL CALCULATED.3IONS-SCNC: 10 MMOL/L (ref 10–20)
AST SERPL W P-5'-P-CCNC: 68 U/L (ref 9–39)
BILIRUB DIRECT SERPL-MCNC: 0.1 MG/DL (ref 0–0.3)
BILIRUB SERPL-MCNC: 0.5 MG/DL (ref 0–1.2)
BUN SERPL-MCNC: 11 MG/DL (ref 6–23)
CALCIUM SERPL-MCNC: 8.1 MG/DL (ref 8.6–10.3)
CHLORIDE SERPL-SCNC: 109 MMOL/L (ref 98–107)
CK SERPL-CCNC: 2635 U/L (ref 0–325)
CO2 SERPL-SCNC: 24 MMOL/L (ref 21–32)
CREAT SERPL-MCNC: 0.91 MG/DL (ref 0.5–1.3)
EGFRCR SERPLBLD CKD-EPI 2021: >90 ML/MIN/1.73M*2
GLUCOSE SERPL-MCNC: 80 MG/DL (ref 74–99)
HOLD SPECIMEN: NORMAL
INR PPP: 1 (ref 0.9–1.2)
PHOSPHATE SERPL-MCNC: 3.5 MG/DL (ref 2.5–4.9)
POTASSIUM SERPL-SCNC: 4.2 MMOL/L (ref 3.5–5.3)
PROT SERPL-MCNC: 5.7 G/DL (ref 6.4–8.2)
PROTHROMBIN TIME: 11.1 SECONDS (ref 9.3–12.7)
SODIUM SERPL-SCNC: 139 MMOL/L (ref 136–145)

## 2025-03-08 PROCEDURE — 2500000001 HC RX 250 WO HCPCS SELF ADMINISTERED DRUGS (ALT 637 FOR MEDICARE OP): Performed by: NURSE PRACTITIONER

## 2025-03-08 PROCEDURE — 2500000002 HC RX 250 W HCPCS SELF ADMINISTERED DRUGS (ALT 637 FOR MEDICARE OP, ALT 636 FOR OP/ED)

## 2025-03-08 PROCEDURE — 99231 SBSQ HOSP IP/OBS SF/LOW 25: CPT

## 2025-03-08 PROCEDURE — 1200000002 HC GENERAL ROOM WITH TELEMETRY DAILY

## 2025-03-08 PROCEDURE — 82248 BILIRUBIN DIRECT: CPT | Performed by: NURSE PRACTITIONER

## 2025-03-08 PROCEDURE — 2500000001 HC RX 250 WO HCPCS SELF ADMINISTERED DRUGS (ALT 637 FOR MEDICARE OP)

## 2025-03-08 PROCEDURE — 85610 PROTHROMBIN TIME: CPT

## 2025-03-08 PROCEDURE — 84520 ASSAY OF UREA NITROGEN: CPT | Performed by: INTERNAL MEDICINE

## 2025-03-08 PROCEDURE — 82550 ASSAY OF CK (CPK): CPT | Performed by: INTERNAL MEDICINE

## 2025-03-08 PROCEDURE — 36415 COLL VENOUS BLD VENIPUNCTURE: CPT | Performed by: INTERNAL MEDICINE

## 2025-03-08 PROCEDURE — 82040 ASSAY OF SERUM ALBUMIN: CPT | Performed by: NURSE PRACTITIONER

## 2025-03-08 PROCEDURE — 99232 SBSQ HOSP IP/OBS MODERATE 35: CPT | Performed by: NURSE PRACTITIONER

## 2025-03-08 PROCEDURE — 2500000004 HC RX 250 GENERAL PHARMACY W/ HCPCS (ALT 636 FOR OP/ED): Performed by: INTERNAL MEDICINE

## 2025-03-08 RX ADMIN — OLANZAPINE 15 MG: 5 TABLET, FILM COATED ORAL at 20:59

## 2025-03-08 RX ADMIN — FLUOXETINE HYDROCHLORIDE 20 MG: 20 CAPSULE ORAL at 09:07

## 2025-03-08 RX ADMIN — Medication 100 MG: at 09:07

## 2025-03-08 RX ADMIN — Medication 1 TABLET: at 09:07

## 2025-03-08 RX ADMIN — SODIUM CHLORIDE 250 ML/HR: 900 INJECTION, SOLUTION INTRAVENOUS at 05:21

## 2025-03-08 RX ADMIN — FOLIC ACID 1 MG: 1 TABLET ORAL at 09:07

## 2025-03-08 ASSESSMENT — COGNITIVE AND FUNCTIONAL STATUS - GENERAL
MOBILITY SCORE: 24
DAILY ACTIVITIY SCORE: 24

## 2025-03-08 ASSESSMENT — PAIN SCALES - GENERAL
PAINLEVEL_OUTOF10: 0 - NO PAIN
PAINLEVEL_OUTOF10: 0 - NO PAIN

## 2025-03-08 NOTE — ASSESSMENT & PLAN NOTE
Nontraumatic rhabdomyolysis  CK 7793, repeat 6275, 5343, 2635-improving  Given 1 L of fluids in the ED  Will continue IV fluids  Monitor renal function  Nephrology following, appreciate recs    Suicidal and homicidal ideation  Suicide and homicide precautions  Sitter at bedside  Consult psych, appreciate recommendations  Will need medically cleared for inpatient psych    EDUARDO-resolved  BUN/creatinine 21/1.36, 0.9 today, baseline is around 1  Resolved    Hypokalemia  Potassium 3.3, replaced, repeat 4.2-resolved    Transaminitis  ALT//171, 101/138, T. bili 1.4-improving  Acute hepatitis panel negative  INR normal  Ultrasound of the liver showed slight fatty infiltration of the liver, cholelithiasis without cholecystitis or biliary ductal dilatation.  Patient does have 0.9 x 1.1 x 1.3 cm hyperechoic lesion in left hepatic lobe which may represent hemangioma, recommendation for MRI of the liver on outpatient basis    Polysubstance abuse  Reportedly uses methamphetamines, cocaine, and THC  Reports occasional alcohol use, smokes 5 cigarettes a day  Declines nicotine patch  Counseled on cessation  Monitor for signs and symptoms of alcohol withdrawal  Alcohol level negative, tox screen is pending    Leukocytosis-resolved  Chest x-ray negative  UA pending    Plan  Monitor on telemetry  IV fluids  UA and tox screen still pending  Consult psych, nephrology, GI, appreciate recommendations  DVT prophylaxis: Lovenox  CBC and BMP in the morning  Will need medical clearance for inpatient psych  Sitter to remain at bedside  Patient does meet criteria for inpatient psych.  Lacks capacity to leave AMA and instruction from psych to call code violet if patient attempts to leave.

## 2025-03-08 NOTE — CARE PLAN
The patient's goals for the shift include rest bathe    The clinical goals for the shift include Calm and quiet environment, maintain normal wake/ sleep cycle

## 2025-03-08 NOTE — CARE PLAN
The patient's goals for the shift include rest bathe    The clinical goals for the shift include safe from self harm

## 2025-03-08 NOTE — PROGRESS NOTES
Nathan Vaughn is a 39 y.o. male on day 1 of admission presenting with Non-traumatic rhabdomyolysis.      Subjective   Patient seen and examined.  Drowsy but awakens easily.  Denies chest pain, shortness of breath, fevers, chills, nausea, or vomiting. No abdominal discomfort.  Sitter for safety remains.       Objective     Last Recorded Vitals  /85   Pulse 74   Temp 36.9 °C (98.4 °F)   Resp 16   Wt 104 kg (230 lb)   SpO2 100%   Intake/Output last 3 Shifts:    Intake/Output Summary (Last 24 hours) at 3/8/2025 1220  Last data filed at 3/8/2025 0841  Gross per 24 hour   Intake 4055.84 ml   Output 800 ml   Net 3255.84 ml       Admission Weight  Weight: 104 kg (230 lb) (03/06/25 1940)    Daily Weight  03/06/25 : 104 kg (230 lb)    Image Results  ECG 12 lead  Sinus tachycardia  QTcB >= 480 msec  Abnormal ECG  When compared with ECG of 03-NOV-2024 17:37,  No significant change was found  Confirmed by Moe Mcfarlane ([id number]) on 3/7/2025 12:43:06 PM  US abdomen limited liver  Narrative: Interpreted By:  Lucy Zheng,   STUDY:  US ABDOMEN LIMITED LIVER; 9:23 am      INDICATION:  Transaminitis      COMPARISON:  None.      ACCESSION NUMBER(S):  [id number]      ORDERING CLINICIAN:  MILA RICHARD      TECHNIQUE:  Limited abdominal ultrasound of the right upper quadrant was  performed utilizing gray scale imaging.      FINDINGS:  The study is limited by the large body habitus of this individual and  overlying bowel gas. The patient is also sedated.      Liver: The liver is of normal-size. There is a mild increase in  echogenicity of the hepatic parenchyma suggesting mild fatty  infiltration of the liver. A 0.9 x 1.1 x 1.3 cm hyperechoic lesion is  seen within left hepatic lobe. This could represent hemangioma.  Gallbladder: Cholelithiasis is present. There is also sludge seen in  the gallbladder lumen. The gallbladder wall is at the upper limits of  normal in thickness at 3 mm. Sonographic Kelly's sign:  Negative  Pancreas: Pancreas is obscured from visualization.  CBD: 4 mm      The right kidney is normal in size measuring 10.9 cm in length  without hydronephrosis.      Impression: Cholelithiasis without cholecystitis or biliary ductal dilatation.      Slight fatty infiltration of the liver.      0.9 x 1.1 x 1.3 cm hyperechoic lesion in left hepatic lobe. This may  represent hemangioma. I would recommend MRI of the liver on an  outpatient basis for further assessment.      Unremarkable appearance of right kidney.      Nonvisualization of pancreas.      MACRO:  None.      Signed by: Lucy Zheng 3/7/2025 9:36 AM  Dictation workstation:   [id number]  XR chest 1 view  Narrative: Interpreted By:  Lucy Zheng,   STUDY:  XR CHEST 1 VIEW 3/7/2025 8:12 am      INDICATION:  Signs/Symptoms:Leukocytosis      COMPARISON:  02/08/2020      ACCESSION NUMBER(S):  [id number]      ORDERING CLINICIAN:  MILA RICHARD      TECHNIQUE:  AP semi-erect view of the chest      FINDINGS:  The cardiac size is accentuated by the low lung volumes and AP  projection. There is crowding of the bronchovascular markings as a  result of the low lung volumes. No airspace consolidation or pleural  abnormality is identified.      Impression: Low lung volumes without acute cardiopulmonary process.      Signed by: Lucy Zheng 3/7/2025 8:25 AM  Dictation workstation:   [id number]      Physical Exam  Vitals reviewed.   Constitutional:       Comments: Drowsy, awakens easily.  Withdrawn.   HENT:      Head: Normocephalic and atraumatic.   Eyes:      Extraocular Movements: Extraocular movements intact.      Conjunctiva/sclera: Conjunctivae normal.   Cardiovascular:      Rate and Rhythm: Normal rate and regular rhythm.   Pulmonary:      Effort: Pulmonary effort is normal.      Breath sounds: Normal breath sounds. No wheezing, rhonchi or rales.   Abdominal:      General: Bowel sounds are normal.      Palpations: Abdomen is soft.      Tenderness: There is no  abdominal tenderness.   Skin:     General: Skin is warm and dry.   Neurological:      General: No focal deficit present.      Mental Status: He is oriented to person, place, and time.         Relevant Results  Lab Results   Component Value Date    GLUCOSE 80 03/08/2025    CALCIUM 8.1 (L) 03/08/2025     03/08/2025    K 4.2 03/08/2025    CO2 24 03/08/2025     (H) 03/08/2025    BUN 11 03/08/2025    CREATININE 0.91 03/08/2025      Lab Results   Component Value Date    WBC 6.8 03/07/2025    HGB 12.5 (L) 03/07/2025    HCT 36.7 (L) 03/07/2025    MCV 82 03/07/2025     03/07/2025    ECG 12 lead  Result Date: 3/7/2025  Sinus tachycardia QTcB >= 480 msec Abnormal ECG When compared with ECG of 03-NOV-2024 17:37, No significant change was found Confirmed by Moe Mcfarlane ([id number]) on 3/7/2025 12:43:06 PM    US abdomen limited liver  Result Date: 3/7/2025  Cholelithiasis without cholecystitis or biliary ductal dilatation.   Slight fatty infiltration of the liver.   0.9 x 1.1 x 1.3 cm hyperechoic lesion in left hepatic lobe. This may represent hemangioma. I would recommend MRI of the liver on an outpatient basis for further assessment.   Unremarkable appearance of right kidney.   Nonvisualization of pancreas.   MACRO: None.   Signed by: Lucy Zheng 3/7/2025 9:36 AM Dictation workstation:   [id number]    XR chest 1 view  Result Date: 3/7/2025  Low lung volumes without acute cardiopulmonary process.   Signed by: Lucy Zheng 3/7/2025 8:25 AM Dictation workstation:   [id number]    XR chest 2 views  Result Date: 2/24/2025  IMPRESSION: No acute radiographic abnormality. : SASKIA   Transcribe Date/Time: Feb 24 2025  8:58A Dictated by : SHERIN BERMUDEZ MD This examination was interpreted and the report reviewed and electronically signed by: SHERIN BERMUDEZ MD on Feb 24 2025  8:58AM  EST         Assessment/Plan      Assessment & Plan  Non-traumatic rhabdomyolysis  Nontraumatic rhabdomyolysis  CK 6543,  repeat 6285, 5328, 2635-improving  Given 1 L of fluids in the ED  Will continue IV fluids  Monitor renal function  Nephrology following, appreciate recs    Suicidal and homicidal ideation  Suicide and homicide precautions  Sitter at bedside  Consult psych, appreciate recommendations  Will need medically cleared for inpatient psych    EDUARDO-resolved  BUN/creatinine 21/1.36, 0.9 today, baseline is around 1  Resolved    Hypokalemia  Potassium 3.3, replaced, repeat 4.2-resolved    Transaminitis  ALT//171, 101/138, T. bili 1.4-improving  Acute hepatitis panel negative  INR normal  Ultrasound of the liver showed slight fatty infiltration of the liver, cholelithiasis without cholecystitis or biliary ductal dilatation.  Patient does have 0.9 x 1.1 x 1.3 cm hyperechoic lesion in left hepatic lobe which may represent hemangioma, recommendation for MRI of the liver on outpatient basis    Polysubstance abuse  Reportedly uses methamphetamines, cocaine, and THC  Reports occasional alcohol use, smokes 5 cigarettes a day  Declines nicotine patch  Counseled on cessation  Monitor for signs and symptoms of alcohol withdrawal  Alcohol level negative, tox screen is pending    Leukocytosis-resolved  Chest x-ray negative  UA pending    Plan  Monitor on telemetry  IV fluids  UA and tox screen still pending  Consult psych, nephrology, GI, appreciate recommendations  DVT prophylaxis: Lovenox  CBC and BMP in the morning  Will need medical clearance for inpatient psych  Sitter to remain at bedside  Patient does meet criteria for inpatient psych.  Lacks capacity to leave AMA and instruction from psych to call code violet if patient attempts to leave.                Adilia Segovia, APRN-CNP

## 2025-03-08 NOTE — NURSING NOTE
Assumed care.  Seen patient asleep on bed on his right side but easily awaken.  Patient is calm, responds to questions, not in distress, no discomfort noted, IVF ongoing, telemetry pack in place.  Plan of care ongoing.

## 2025-03-08 NOTE — PROGRESS NOTES
Brief renal note  Chart/labs reviewed and discussed with patient's nurse.    SCr trending downwards and CK was down to 2635 U/L this morning.  Low risk for pigment nephropathy with CK less than 5K and we can discontinue IV fluids.    No further renal input and will follow peripherally.  Please call with questions.      Jigar Abrams MD  03/08/25  2:02 PM

## 2025-03-08 NOTE — PROGRESS NOTES
LFTs downtrending. Acute hep panel is negative. INR normal. Likely related to fatty liver vs rhabdo. No acute GI intervention indicated at this time. Please call us with questions or concerns

## 2025-03-08 NOTE — PROGRESS NOTES
"Nathan Vaughn is a 39 y.o. male on day 1 of admission presenting with Non-traumatic rhabdomyolysis.      Subjective   Patient's chart was reviewed and case was discussed with nursing. Nursing reported that patient has been sleeping for most of their shift but has remained compliant with medications and ate well during meals. Patient was seen in his assigned room, appearing to be asleep but opened his eyes when his name was called. He continued to fall asleep throughout the encounter. When asked about his mood, he stated that it is “ok.” However, he reported ongoing suicidal ideation without a specific plan and nonspecific homicidal ideation, not directed toward any particular individual. He denied experiencing auditory or visual hallucinations, stating, “not today.” Patient expressed feeling tired and stated that he would like to rest.     Objective     Last Recorded Vitals  Blood pressure 123/85, pulse 74, temperature 36.9 °C (98.4 °F), resp. rate 16, height 1.854 m (6' 1\"), weight 104 kg (230 lb), SpO2 100%.    Review of Systems    Psychiatric ROS - Adult  Depression: sleep disturbance: difficulty falling asleep and frequent awakening and markedly diminished interest or pleasure in all or most activities  Anxiety: restlessness or feeling keyed up or on edge, difficulty concentrating, and irritability  Gely:  reportedly, patient had rapid/pressured speech last night  Psychosis: homicidal ideation  Delirium: negative   Trauma: negative      Physical Exam      Mental Status Exam  General: 39 years old male, lying in bed comfortably during interview.  Appearance: Appeared as age stated; fairly dressed/groomed.  Attitude: cooperative, calm during encounter  Behavior: Fair EC; overall responding appropriately  Motor Activity: No notable natasha PMAR  Speech: Clear, with fair phonation, and no lisp nor dysarthria.   Mood: \"ok \"  Affect: Restricted and guarded; decreased range/intensity, but overall appropriate and " congruent  Thought Content:  Endorsed SI/HI. Patient reported ongoing suicidal ideation without a specific plan and nonspecific homicidal ideation, not directed toward any particular individual.   Thought Perception: Did not appear to be responding to internal stimuli. Not endorsing AVH-during interview  Cognition: Grossly intact; A&O x4/4 to self, place, date, and context.  Insight: Limited  Judgement: Fair    Psychiatric Risk Assessment  Violence Risk Factors:  male, current psychiatric illness, past history of violence, substance abuse , unemployed, violent or homicidal ideation, and stress/destabilizers  Acute Risk of Harm to Others is Considered: Moderate  Suicide Risk Factors: male, prior suicide attempts , lives alone or lack of social support, current psychiatric illness, substance abuse , lack of treatment access, discontinuities in treatment, or recent discharge from hospital, and nonadherence to medication treatment for psychosis   Protective Factors: social support/connectedness  Acute Risk of Harm to Self is Considered: High       Relevant Results  Results for orders placed or performed during the hospital encounter of 03/06/25 (from the past 96 hours)   CBC and Auto Differential   Result Value Ref Range    WBC 12.1 (H) 4.4 - 11.3 x10*3/uL    nRBC 0.0 0.0 - 0.0 /100 WBCs    RBC 4.58 4.50 - 5.90 x10*6/uL    Hemoglobin 12.7 (L) 13.5 - 17.5 g/dL    Hematocrit 37.3 (L) 41.0 - 52.0 %    MCV 81 80 - 100 fL    MCH 27.7 26.0 - 34.0 pg    MCHC 34.0 32.0 - 36.0 g/dL    RDW 12.9 11.5 - 14.5 %    Platelets 463 (H) 150 - 450 x10*3/uL    Neutrophils % 58.8 40.0 - 80.0 %    Immature Granulocytes %, Automated 0.2 0.0 - 0.9 %    Lymphocytes % 28.8 13.0 - 44.0 %    Monocytes % 11.7 2.0 - 10.0 %    Eosinophils % 0.3 0.0 - 6.0 %    Basophils % 0.2 0.0 - 2.0 %    Neutrophils Absolute 7.10 1.20 - 7.70 x10*3/uL    Immature Granulocytes Absolute, Automated 0.03 0.00 - 0.70 x10*3/uL    Lymphocytes Absolute 3.49 1.20 - 4.80  x10*3/uL    Monocytes Absolute 1.41 (H) 0.10 - 1.00 x10*3/uL    Eosinophils Absolute 0.04 0.00 - 0.70 x10*3/uL    Basophils Absolute 0.03 0.00 - 0.10 x10*3/uL   Comprehensive metabolic panel   Result Value Ref Range    Glucose 116 (H) 74 - 99 mg/dL    Sodium 134 (L) 136 - 145 mmol/L    Potassium 3.3 (L) 3.5 - 5.3 mmol/L    Chloride 98 98 - 107 mmol/L    Bicarbonate 22 21 - 32 mmol/L    Anion Gap 17 10 - 20 mmol/L    Urea Nitrogen 21 6 - 23 mg/dL    Creatinine 1.36 (H) 0.50 - 1.30 mg/dL    eGFR 69 >60 mL/min/1.73m*2    Calcium 9.2 8.6 - 10.3 mg/dL    Albumin 4.6 3.4 - 5.0 g/dL    Alkaline Phosphatase 76 33 - 120 U/L    Total Protein 7.7 6.4 - 8.2 g/dL     (H) 9 - 39 U/L    Bilirubin, Total 1.5 (H) 0.0 - 1.2 mg/dL     (H) 10 - 52 U/L   Acute Toxicology Panel, Blood   Result Value Ref Range    Acetaminophen <10.0 10.0 - 30.0 ug/mL    Salicylate  <3 4 - 20 mg/dL    Alcohol <10 <=10 mg/dL   Creatine Kinase   Result Value Ref Range    Creatine Kinase 7,793 (H) 0 - 325 U/L   ECG 12 lead   Result Value Ref Range    Ventricular Rate 115 BPM    Atrial Rate 115 BPM    MT Interval 144 ms    QRS Duration 74 ms    QT Interval 356 ms    QTC Calculation(Bazett) 492 ms    P Axis 24 degrees    R Axis 60 degrees    T Axis 48 degrees    QRS Count 19 beats    Q Onset 220 ms    T Offset 398 ms    QTC Fredericia 442 ms   Sars-CoV-2, Influenza A/B and RSV PCR   Result Value Ref Range    Coronavirus 2019, PCR Not Detected Not Detected    Flu A Result Not Detected Not Detected    Flu B Result Not Detected Not Detected    RSV PCR Not Detected Not Detected   Creatine Kinase   Result Value Ref Range    Creatine Kinase 6,275 (H) 0 - 325 U/L   Magnesium   Result Value Ref Range    Magnesium 2.39 1.60 - 2.40 mg/dL   Comprehensive metabolic panel   Result Value Ref Range    Glucose 86 74 - 99 mg/dL    Sodium 136 136 - 145 mmol/L    Potassium 3.7 3.5 - 5.3 mmol/L    Chloride 103 98 - 107 mmol/L    Bicarbonate 24 21 - 32 mmol/L    Anion  Gap 13 10 - 20 mmol/L    Urea Nitrogen 19 6 - 23 mg/dL    Creatinine 1.20 0.50 - 1.30 mg/dL    eGFR 80 >60 mL/min/1.73m*2    Calcium 8.4 (L) 8.6 - 10.3 mg/dL    Albumin 3.9 3.4 - 5.0 g/dL    Alkaline Phosphatase 66 33 - 120 U/L    Total Protein 7.2 6.4 - 8.2 g/dL     (H) 9 - 39 U/L    Bilirubin, Total 1.4 (H) 0.0 - 1.2 mg/dL     (H) 10 - 52 U/L   Hepatitis panel, acute   Result Value Ref Range    Hepatitis B Surface AG Nonreactive Nonreactive    Hepatitis A  AB- IgM Nonreactive Nonreactive    Hepatitis B Core AB; IgM Nonreactive Nonreactive    Hepatitis C AB Nonreactive Nonreactive   CBC   Result Value Ref Range    WBC 6.8 4.4 - 11.3 x10*3/uL    nRBC 0.0 0.0 - 0.0 /100 WBCs    RBC 4.49 (L) 4.50 - 5.90 x10*6/uL    Hemoglobin 12.5 (L) 13.5 - 17.5 g/dL    Hematocrit 36.7 (L) 41.0 - 52.0 %    MCV 82 80 - 100 fL    MCH 27.8 26.0 - 34.0 pg    MCHC 34.1 32.0 - 36.0 g/dL    RDW 12.7 11.5 - 14.5 %    Platelets 408 150 - 450 x10*3/uL   Creatine Kinase   Result Value Ref Range    Creatine Kinase 5,343 (H) 0 - 325 U/L   Renal Function Panel   Result Value Ref Range    Glucose 80 74 - 99 mg/dL    Sodium 139 136 - 145 mmol/L    Potassium 4.2 3.5 - 5.3 mmol/L    Chloride 109 (H) 98 - 107 mmol/L    Bicarbonate 24 21 - 32 mmol/L    Anion Gap 10 10 - 20 mmol/L    Urea Nitrogen 11 6 - 23 mg/dL    Creatinine 0.91 0.50 - 1.30 mg/dL    eGFR >90 >60 mL/min/1.73m*2    Calcium 8.1 (L) 8.6 - 10.3 mg/dL    Phosphorus 3.5 2.5 - 4.9 mg/dL    Albumin 3.2 (L) 3.4 - 5.0 g/dL   Creatine Kinase   Result Value Ref Range    Creatine Kinase 2,635 (H) 0 - 325 U/L   Protime-INR   Result Value Ref Range    Protime 11.1 9.3 - 12.7 seconds    INR 1.0 0.9 - 1.2   Hepatic function panel   Result Value Ref Range    Albumin 3.3 (L) 3.4 - 5.0 g/dL    Bilirubin, Total 0.5 0.0 - 1.2 mg/dL    Bilirubin, Direct 0.1 0.0 - 0.3 mg/dL    Alkaline Phosphatase 60 33 - 120 U/L    ALT 73 (H) 10 - 52 U/L    AST 68 (H) 9 - 39 U/L    Total Protein 5.7 (L) 6.4 -  8.2 g/dL   Lavender Top   Result Value Ref Range    Extra Tube Hold for add-ons.     Pertinent labs were reviewed    Scheduled medications  enoxaparin, 40 mg, subcutaneous, q24h  FLUoxetine, 20 mg, oral, Daily  folic acid, 1 mg, oral, Daily  melatonin, 3 mg, oral, Daily  multivitamin with minerals, 1 tablet, oral, Daily  OLANZapine, 15 mg, oral, Nightly  thiamine, 100 mg, oral, Daily      Continuous medications     PRN medications  PRN medications: acetaminophen **OR** acetaminophen, diphenhydrAMINE **OR** diphenhydrAMINE, haloperidol lactate **OR** haloperidol, hydrOXYzine pamoate, LORazepam, ondansetron **OR** ondansetron, polyethylene glycol    Assessment/Plan   Assessment & Plan  Non-traumatic rhabdomyolysis                  Per patient:  Psychosis   Bipolar   Schizophrenia   Anxiety     PLAN/ RECOMMENDATIONS:      -Suicidal ideation/Homicidal ideation: Continue sitter. Continue to assess     -Continue Haldol 5 mg po/IM every 6 hours prn for agitation- Recommend daily EKG monitoring (Qtc 492)  -Continue Benadryl 50 mg po/IM every 6 hours for agitation  -Continue Ativan 0.5 mg IV every 4 hours prn for agitation (started by primary team)  -Continue Prozac 20 mg po daily, goal to increase to 40 mg as tolerated (PTA dose)  -Continue Zyprexa 15 mg po nightly  -Continue Vistaril 50 mg every 6 hours prn for anxiety  -Continue Melatonin to 3 mg daily to help regulate sleep/wake cycle     - Patient does currently meet criteria for inpatient psychiatric admission. Once patient is deemed medically cleared, please document in note that patient is MEDICALLY CLEARED and contact EPAT for referral by placing consult for EPAT and calling [phone] ([facility]).        - Patient lacks the capacity to leave AMA at this time and thus cannot leave AMA. Call CODE VIOLET if patient attempts to leave AMA.     - Patient does require a 1:1 sitter from a psychiatric perspective at this time.     -Thank you for allowing us to participate in the  care of this patient.  Psychiatry will continue to follow.     I spent 25 minutes in the professional and overall care of this patient.      CHUY Smart-CNP

## 2025-03-09 VITALS
DIASTOLIC BLOOD PRESSURE: 61 MMHG | HEART RATE: 60 BPM | BODY MASS INDEX: 30.48 KG/M2 | OXYGEN SATURATION: 100 % | TEMPERATURE: 97.5 F | SYSTOLIC BLOOD PRESSURE: 98 MMHG | HEIGHT: 73 IN | RESPIRATION RATE: 16 BRPM | WEIGHT: 230 LBS

## 2025-03-09 LAB
ALBUMIN SERPL BCP-MCNC: 3.6 G/DL (ref 3.4–5)
ALP SERPL-CCNC: 65 U/L (ref 33–120)
ALT SERPL W P-5'-P-CCNC: 69 U/L (ref 10–52)
ANION GAP SERPL CALCULATED.3IONS-SCNC: 9 MMOL/L (ref 10–20)
AST SERPL W P-5'-P-CCNC: 47 U/L (ref 9–39)
BILIRUB DIRECT SERPL-MCNC: 0.1 MG/DL (ref 0–0.3)
BILIRUB SERPL-MCNC: 0.3 MG/DL (ref 0–1.2)
BUN SERPL-MCNC: 11 MG/DL (ref 6–23)
CALCIUM SERPL-MCNC: 8.9 MG/DL (ref 8.6–10.3)
CHLORIDE SERPL-SCNC: 106 MMOL/L (ref 98–107)
CK SERPL-CCNC: 1405 U/L (ref 0–325)
CO2 SERPL-SCNC: 27 MMOL/L (ref 21–32)
CREAT SERPL-MCNC: 1.02 MG/DL (ref 0.5–1.3)
EGFRCR SERPLBLD CKD-EPI 2021: >90 ML/MIN/1.73M*2
GLUCOSE SERPL-MCNC: 76 MG/DL (ref 74–99)
HOLD SPECIMEN: NORMAL
PHOSPHATE SERPL-MCNC: 4.2 MG/DL (ref 2.5–4.9)
POTASSIUM SERPL-SCNC: 4.2 MMOL/L (ref 3.5–5.3)
PROT SERPL-MCNC: 6.4 G/DL (ref 6.4–8.2)
SODIUM SERPL-SCNC: 138 MMOL/L (ref 136–145)

## 2025-03-09 PROCEDURE — 36415 COLL VENOUS BLD VENIPUNCTURE: CPT | Performed by: INTERNAL MEDICINE

## 2025-03-09 PROCEDURE — 99231 SBSQ HOSP IP/OBS SF/LOW 25: CPT

## 2025-03-09 PROCEDURE — 2500000005 HC RX 250 GENERAL PHARMACY W/O HCPCS

## 2025-03-09 PROCEDURE — 2500000002 HC RX 250 W HCPCS SELF ADMINISTERED DRUGS (ALT 637 FOR MEDICARE OP, ALT 636 FOR OP/ED)

## 2025-03-09 PROCEDURE — 82247 BILIRUBIN TOTAL: CPT | Performed by: NURSE PRACTITIONER

## 2025-03-09 PROCEDURE — 84100 ASSAY OF PHOSPHORUS: CPT | Performed by: INTERNAL MEDICINE

## 2025-03-09 PROCEDURE — 99232 SBSQ HOSP IP/OBS MODERATE 35: CPT | Performed by: NURSE PRACTITIONER

## 2025-03-09 PROCEDURE — 82550 ASSAY OF CK (CPK): CPT | Performed by: INTERNAL MEDICINE

## 2025-03-09 PROCEDURE — 2500000001 HC RX 250 WO HCPCS SELF ADMINISTERED DRUGS (ALT 637 FOR MEDICARE OP): Performed by: NURSE PRACTITIONER

## 2025-03-09 PROCEDURE — 80053 COMPREHEN METABOLIC PANEL: CPT | Performed by: NURSE PRACTITIONER

## 2025-03-09 PROCEDURE — 1200000002 HC GENERAL ROOM WITH TELEMETRY DAILY

## 2025-03-09 PROCEDURE — 2500000001 HC RX 250 WO HCPCS SELF ADMINISTERED DRUGS (ALT 637 FOR MEDICARE OP)

## 2025-03-09 RX ORDER — LORAZEPAM 2 MG/ML
0.5 INJECTION INTRAMUSCULAR EVERY 4 HOURS PRN
Status: ACTIVE | OUTPATIENT
Start: 2025-03-09

## 2025-03-09 RX ORDER — HYDROXYZINE PAMOATE 50 MG/1
50 CAPSULE ORAL EVERY 6 HOURS PRN
Status: ACTIVE | OUTPATIENT
Start: 2025-03-09

## 2025-03-09 RX ADMIN — Medication 1 TABLET: at 08:22

## 2025-03-09 RX ADMIN — FLUOXETINE HYDROCHLORIDE 20 MG: 20 CAPSULE ORAL at 08:22

## 2025-03-09 RX ADMIN — FOLIC ACID 1 MG: 1 TABLET ORAL at 08:22

## 2025-03-09 RX ADMIN — Medication 100 MG: at 08:22

## 2025-03-09 RX ADMIN — OLANZAPINE 15 MG: 5 TABLET, FILM COATED ORAL at 21:41

## 2025-03-09 RX ADMIN — Medication 3 MG: at 21:41

## 2025-03-09 ASSESSMENT — COGNITIVE AND FUNCTIONAL STATUS - GENERAL
MOBILITY SCORE: 24
DAILY ACTIVITIY SCORE: 24

## 2025-03-09 ASSESSMENT — COLUMBIA-SUICIDE SEVERITY RATING SCALE - C-SSRS
6. HAVE YOU EVER DONE ANYTHING, STARTED TO DO ANYTHING, OR PREPARED TO DO ANYTHING TO END YOUR LIFE?: NO
5. HAVE YOU STARTED TO WORK OUT OR WORKED OUT THE DETAILS OF HOW TO KILL YOURSELF? DO YOU INTEND TO CARRY OUT THIS PLAN?: NO
1. SINCE LAST CONTACT, HAVE YOU WISHED YOU WERE DEAD OR WISHED YOU COULD GO TO SLEEP AND NOT WAKE UP?: YES
2. HAVE YOU ACTUALLY HAD ANY THOUGHTS OF KILLING YOURSELF?: YES

## 2025-03-09 ASSESSMENT — PAIN - FUNCTIONAL ASSESSMENT: PAIN_FUNCTIONAL_ASSESSMENT: 0-10

## 2025-03-09 ASSESSMENT — PAIN SCALES - GENERAL
PAINLEVEL_OUTOF10: 0 - NO PAIN
PAINLEVEL_OUTOF10: 0 - NO PAIN

## 2025-03-09 NOTE — PROGRESS NOTES
Nathan Vaughn is a 39 y.o. male on day 2 of admission presenting with Non-traumatic rhabdomyolysis.      Subjective   Patient seen and examined.  Drowsy but awakens easily.  Denies chest pain, shortness of breath, fevers, chills, nausea, or vomiting. No abdominal discomfort.  Sitter for safety remains.       Objective     Last Recorded Vitals  BP 98/61 (BP Location: Right arm, Patient Position: Lying) Comment (Patient Position): side  Pulse 60   Temp 36.4 °C (97.5 °F) (Temporal)   Resp 16   Wt 104 kg (230 lb)   SpO2 100%   Intake/Output last 3 Shifts:    Intake/Output Summary (Last 24 hours) at 3/9/2025 1036  Last data filed at 3/9/2025 0826  Gross per 24 hour   Intake 720 ml   Output 1025 ml   Net -305 ml       Admission Weight  Weight: 104 kg (230 lb) (03/06/25 1940)    Daily Weight  03/06/25 : 104 kg (230 lb)    Image Results  ECG 12 lead  Sinus tachycardia  QTcB >= 480 msec  Abnormal ECG  When compared with ECG of 03-NOV-2024 17:37,  No significant change was found  Confirmed by Moe Mcfarlane ([id number]) on 3/7/2025 12:43:06 PM  US abdomen limited liver  Narrative: Interpreted By:  Lucy Zheng,   STUDY:  US ABDOMEN LIMITED LIVER; 9:23 am      INDICATION:  Transaminitis      COMPARISON:  None.      ACCESSION NUMBER(S):  [id number]      ORDERING CLINICIAN:  MILA RICHARD      TECHNIQUE:  Limited abdominal ultrasound of the right upper quadrant was  performed utilizing gray scale imaging.      FINDINGS:  The study is limited by the large body habitus of this individual and  overlying bowel gas. The patient is also sedated.      Liver: The liver is of normal-size. There is a mild increase in  echogenicity of the hepatic parenchyma suggesting mild fatty  infiltration of the liver. A 0.9 x 1.1 x 1.3 cm hyperechoic lesion is  seen within left hepatic lobe. This could represent hemangioma.  Gallbladder: Cholelithiasis is present. There is also sludge seen in  the gallbladder lumen. The gallbladder wall is at the  upper limits of  normal in thickness at 3 mm. Sonographic Kelly's sign: Negative  Pancreas: Pancreas is obscured from visualization.  CBD: 4 mm      The right kidney is normal in size measuring 10.9 cm in length  without hydronephrosis.      Impression: Cholelithiasis without cholecystitis or biliary ductal dilatation.      Slight fatty infiltration of the liver.      0.9 x 1.1 x 1.3 cm hyperechoic lesion in left hepatic lobe. This may  represent hemangioma. I would recommend MRI of the liver on an  outpatient basis for further assessment.      Unremarkable appearance of right kidney.      Nonvisualization of pancreas.      MACRO:  None.      Signed by: Lucy Zheng 3/7/2025 9:36 AM  Dictation workstation:   [id number]  XR chest 1 view  Narrative: Interpreted By:  Lucy Zheng,   STUDY:  XR CHEST 1 VIEW 3/7/2025 8:12 am      INDICATION:  Signs/Symptoms:Leukocytosis      COMPARISON:  02/08/2020      ACCESSION NUMBER(S):  [id number]      ORDERING CLINICIAN:  MILA RICHARD      TECHNIQUE:  AP semi-erect view of the chest      FINDINGS:  The cardiac size is accentuated by the low lung volumes and AP  projection. There is crowding of the bronchovascular markings as a  result of the low lung volumes. No airspace consolidation or pleural  abnormality is identified.      Impression: Low lung volumes without acute cardiopulmonary process.      Signed by: Lucy Zheng 3/7/2025 8:25 AM  Dictation workstation:   [id number]      Physical Exam  Vitals reviewed.   Constitutional:       Comments: Drowsy, awakens easily.  Withdrawn.   HENT:      Head: Normocephalic and atraumatic.   Eyes:      Extraocular Movements: Extraocular movements intact.      Conjunctiva/sclera: Conjunctivae normal.   Cardiovascular:      Rate and Rhythm: Normal rate.   Pulmonary:      Effort: Pulmonary effort is normal.   Abdominal:      General: There is no distension.      Palpations: Abdomen is soft.   Skin:     General: Skin is warm and dry.    Neurological:      General: No focal deficit present.      Mental Status: He is oriented to person, place, and time.         Relevant Results  Lab Results   Component Value Date    GLUCOSE 76 03/09/2025    CALCIUM 8.9 03/09/2025     03/09/2025    K 4.2 03/09/2025    CO2 27 03/09/2025     03/09/2025    BUN 11 03/09/2025    CREATININE 1.02 03/09/2025      Lab Results   Component Value Date    WBC 6.8 03/07/2025    HGB 12.5 (L) 03/07/2025    HCT 36.7 (L) 03/07/2025    MCV 82 03/07/2025     03/07/2025    ECG 12 lead  Result Date: 3/7/2025  Sinus tachycardia QTcB >= 480 msec Abnormal ECG When compared with ECG of 03-NOV-2024 17:37, No significant change was found Confirmed by Moe Mcfarlane ([id number]) on 3/7/2025 12:43:06 PM    US abdomen limited liver  Result Date: 3/7/2025  Cholelithiasis without cholecystitis or biliary ductal dilatation.   Slight fatty infiltration of the liver.   0.9 x 1.1 x 1.3 cm hyperechoic lesion in left hepatic lobe. This may represent hemangioma. I would recommend MRI of the liver on an outpatient basis for further assessment.   Unremarkable appearance of right kidney.   Nonvisualization of pancreas.   MACRO: None.   Signed by: Lucy Zheng 3/7/2025 9:36 AM Dictation workstation:   [id number]    XR chest 1 view  Result Date: 3/7/2025  Low lung volumes without acute cardiopulmonary process.   Signed by: Lucy Zheng 3/7/2025 8:25 AM Dictation workstation:   [id number]    XR chest 2 views  Result Date: 2/24/2025  IMPRESSION: No acute radiographic abnormality. : PHYLICIAB   Transcribe Date/Time: Feb 24 2025  8:58A Dictated by : SHERIN BERMUDZE MD This examination was interpreted and the report reviewed and electronically signed by: SHERIN BERMUDEZ MD on Feb 24 2025  8:58AM  EST         Assessment/Plan      Assessment & Plan  Non-traumatic rhabdomyolysis  Nontraumatic rhabdomyolysis  CK 7793, repeat 6275, 5343, 2635 1405-improving  Stop IV fluids per nephrology-stable  from nephrology standpoint    Suicidal and homicidal ideation  Suicide and homicide precautions  Sitter at bedside  Consult psych, appreciate recommendations  Will need medically cleared for inpatient psych    EDUARDO-resolved    Hypokalemia-resolved    Transaminitis  ALT//171, 101/138, T. bili 1.4, 69/47 t bili 0.3-stable  Acute hepatitis panel negative  INR normal  Ultrasound of the liver showed slight fatty infiltration of the liver, cholelithiasis without cholecystitis or biliary ductal dilatation.  Patient does have 0.9 x 1.1 x 1.3 cm hyperechoic lesion in left hepatic lobe which may represent hemangioma, recommendation for MRI of the liver on outpatient basis  Cleared by GI    Polysubstance abuse  Reportedly uses methamphetamines, cocaine, and THC  Reports occasional alcohol use, smokes 5 cigarettes a day  Declines nicotine patch  Counseled on cessation  Monitor for signs and symptoms of alcohol withdrawal  Alcohol level negative, tox screen is pending    Leukocytosis-resolved  Chest x-ray negative  UA pending    Plan  DVT prophylaxis: Lovenox  CBC and BMP, CK in the morning      Patient is medically stable for discharge to psych.  Patient does meet criteria for inpatient psych.  Lacks capacity to leave AMA and instruction from psych to call code violet if patient attempts to leave.                Adilia Segovia, APRN-CNP

## 2025-03-09 NOTE — NURSING NOTE
"Spoke to patient with provider at bedside regarding IV insertion, pt refuses at this time, requesting that we \"wait until later\"  "

## 2025-03-09 NOTE — NURSING NOTE
Cluster care when patient is awake and educate on the need for IV, but if patient refuses just document. Patient is sleeping right now.

## 2025-03-09 NOTE — NURSING NOTE
Assumed care.  Patient in bed asleep, breathing even and unlabored.  Safety measures ensured . Briefed sitter about the patient.

## 2025-03-09 NOTE — PROGRESS NOTES
"Nathan Vaughn is a 39 y.o. male on day 2 of admission presenting with Non-traumatic rhabdomyolysis.      Subjective   Patient’s chart was reviewed, and case was discussed with nursing staff. Nursing reported that patient is primarily resting in his room. He remains compliant with his medications and is eating and sleeping well. No agitation or combative behavior reported by nursing.  Patient was seen in his assigned room with a bedside sitter present. Upon entering, the patient appeared to be sleeping but immediately opened his eyes when I knocked on the door. He continues to endorse suicidal ideation without a specific plan, as well as generalized homicidal ideation without a specific target. However, he denies experiencing auditory or visual hallucinations at this time. His reported mood is \"depressed.\" Patient reported that he slept well last night and ate well throughout the day. His CK level is trending downward and is now at 1,405.       Objective     Last Recorded Vitals  Blood pressure 98/61, pulse 60, temperature 36.4 °C (97.5 °F), temperature source Temporal, resp. rate 16, height 1.854 m (6' 1\"), weight 104 kg (230 lb), SpO2 100%.    Review of Systems    Psychiatric ROS - Adult  Depression: sleep disturbance: difficulty falling asleep and frequent awakening and markedly diminished interest or pleasure in all or most activities  Anxiety: restlessness or feeling keyed up or on edge, difficulty concentrating, and irritability  Gely:  reportedly, patient had rapid/pressured speech last night  Psychosis: homicidal ideation  Delirium: negative   Trauma: negative      Physical Exam      Mental Status Exam  General: 39 years old male, lying in bed comfortably during interview.  Appearance: Appeared as age stated; fairly dressed/groomed.  Attitude: cooperative, calm during encounter  Behavior: Fair EC; overall responding appropriately  Motor Activity: No notable natasha PMAR  Speech: Clear, with fair phonation, " "and no lisp nor dysarthria.   Mood: \"depressed\"  Affect: Restricted and guarded; decreased range/intensity, but overall appropriate and congruent  Thought Content: He continues to endorse suicidal ideation without a specific plan, as well as generalized homicidal ideation without a specific target.    Thought Perception: Did not appear to be responding to internal stimuli. Not endorsing AVH-during interview  Cognition: Grossly intact; A&O x4/4 to self, place, date, and context.  Insight: Limited  Judgement: Fair    Psychiatric Risk Assessment  Violence Risk Factors:  male, current psychiatric illness, past history of violence, substance abuse , unemployed, violent or homicidal ideation, and stress/destabilizers  Acute Risk of Harm to Others is Considered: Moderate  Suicide Risk Factors: male, prior suicide attempts , lives alone or lack of social support, current psychiatric illness, substance abuse , lack of treatment access, discontinuities in treatment, or recent discharge from hospital, and nonadherence to medication treatment for psychosis   Protective Factors: social support/connectedness  Acute Risk of Harm to Self is Considered: High       Relevant Results  Results for orders placed or performed during the hospital encounter of 03/06/25 (from the past 96 hours)   CBC and Auto Differential   Result Value Ref Range    WBC 12.1 (H) 4.4 - 11.3 x10*3/uL    nRBC 0.0 0.0 - 0.0 /100 WBCs    RBC 4.58 4.50 - 5.90 x10*6/uL    Hemoglobin 12.7 (L) 13.5 - 17.5 g/dL    Hematocrit 37.3 (L) 41.0 - 52.0 %    MCV 81 80 - 100 fL    MCH 27.7 26.0 - 34.0 pg    MCHC 34.0 32.0 - 36.0 g/dL    RDW 12.9 11.5 - 14.5 %    Platelets 463 (H) 150 - 450 x10*3/uL    Neutrophils % 58.8 40.0 - 80.0 %    Immature Granulocytes %, Automated 0.2 0.0 - 0.9 %    Lymphocytes % 28.8 13.0 - 44.0 %    Monocytes % 11.7 2.0 - 10.0 %    Eosinophils % 0.3 0.0 - 6.0 %    Basophils % 0.2 0.0 - 2.0 %    Neutrophils Absolute 7.10 1.20 - 7.70 x10*3/uL    " Immature Granulocytes Absolute, Automated 0.03 0.00 - 0.70 x10*3/uL    Lymphocytes Absolute 3.49 1.20 - 4.80 x10*3/uL    Monocytes Absolute 1.41 (H) 0.10 - 1.00 x10*3/uL    Eosinophils Absolute 0.04 0.00 - 0.70 x10*3/uL    Basophils Absolute 0.03 0.00 - 0.10 x10*3/uL   Comprehensive metabolic panel   Result Value Ref Range    Glucose 116 (H) 74 - 99 mg/dL    Sodium 134 (L) 136 - 145 mmol/L    Potassium 3.3 (L) 3.5 - 5.3 mmol/L    Chloride 98 98 - 107 mmol/L    Bicarbonate 22 21 - 32 mmol/L    Anion Gap 17 10 - 20 mmol/L    Urea Nitrogen 21 6 - 23 mg/dL    Creatinine 1.36 (H) 0.50 - 1.30 mg/dL    eGFR 69 >60 mL/min/1.73m*2    Calcium 9.2 8.6 - 10.3 mg/dL    Albumin 4.6 3.4 - 5.0 g/dL    Alkaline Phosphatase 76 33 - 120 U/L    Total Protein 7.7 6.4 - 8.2 g/dL     (H) 9 - 39 U/L    Bilirubin, Total 1.5 (H) 0.0 - 1.2 mg/dL     (H) 10 - 52 U/L   Acute Toxicology Panel, Blood   Result Value Ref Range    Acetaminophen <10.0 10.0 - 30.0 ug/mL    Salicylate  <3 4 - 20 mg/dL    Alcohol <10 <=10 mg/dL   Creatine Kinase   Result Value Ref Range    Creatine Kinase 7,793 (H) 0 - 325 U/L   ECG 12 lead   Result Value Ref Range    Ventricular Rate 115 BPM    Atrial Rate 115 BPM    MA Interval 144 ms    QRS Duration 74 ms    QT Interval 356 ms    QTC Calculation(Bazett) 492 ms    P Axis 24 degrees    R Axis 60 degrees    T Axis 48 degrees    QRS Count 19 beats    Q Onset 220 ms    T Offset 398 ms    QTC Fredericia 442 ms   Sars-CoV-2, Influenza A/B and RSV PCR   Result Value Ref Range    Coronavirus 2019, PCR Not Detected Not Detected    Flu A Result Not Detected Not Detected    Flu B Result Not Detected Not Detected    RSV PCR Not Detected Not Detected   Creatine Kinase   Result Value Ref Range    Creatine Kinase 6,275 (H) 0 - 325 U/L   Magnesium   Result Value Ref Range    Magnesium 2.39 1.60 - 2.40 mg/dL   Comprehensive metabolic panel   Result Value Ref Range    Glucose 86 74 - 99 mg/dL    Sodium 136 136 - 145  mmol/L    Potassium 3.7 3.5 - 5.3 mmol/L    Chloride 103 98 - 107 mmol/L    Bicarbonate 24 21 - 32 mmol/L    Anion Gap 13 10 - 20 mmol/L    Urea Nitrogen 19 6 - 23 mg/dL    Creatinine 1.20 0.50 - 1.30 mg/dL    eGFR 80 >60 mL/min/1.73m*2    Calcium 8.4 (L) 8.6 - 10.3 mg/dL    Albumin 3.9 3.4 - 5.0 g/dL    Alkaline Phosphatase 66 33 - 120 U/L    Total Protein 7.2 6.4 - 8.2 g/dL     (H) 9 - 39 U/L    Bilirubin, Total 1.4 (H) 0.0 - 1.2 mg/dL     (H) 10 - 52 U/L   Hepatitis panel, acute   Result Value Ref Range    Hepatitis B Surface AG Nonreactive Nonreactive    Hepatitis A  AB- IgM Nonreactive Nonreactive    Hepatitis B Core AB; IgM Nonreactive Nonreactive    Hepatitis C AB Nonreactive Nonreactive   CBC   Result Value Ref Range    WBC 6.8 4.4 - 11.3 x10*3/uL    nRBC 0.0 0.0 - 0.0 /100 WBCs    RBC 4.49 (L) 4.50 - 5.90 x10*6/uL    Hemoglobin 12.5 (L) 13.5 - 17.5 g/dL    Hematocrit 36.7 (L) 41.0 - 52.0 %    MCV 82 80 - 100 fL    MCH 27.8 26.0 - 34.0 pg    MCHC 34.1 32.0 - 36.0 g/dL    RDW 12.7 11.5 - 14.5 %    Platelets 408 150 - 450 x10*3/uL   Creatine Kinase   Result Value Ref Range    Creatine Kinase 5,343 (H) 0 - 325 U/L   Renal Function Panel   Result Value Ref Range    Glucose 80 74 - 99 mg/dL    Sodium 139 136 - 145 mmol/L    Potassium 4.2 3.5 - 5.3 mmol/L    Chloride 109 (H) 98 - 107 mmol/L    Bicarbonate 24 21 - 32 mmol/L    Anion Gap 10 10 - 20 mmol/L    Urea Nitrogen 11 6 - 23 mg/dL    Creatinine 0.91 0.50 - 1.30 mg/dL    eGFR >90 >60 mL/min/1.73m*2    Calcium 8.1 (L) 8.6 - 10.3 mg/dL    Phosphorus 3.5 2.5 - 4.9 mg/dL    Albumin 3.2 (L) 3.4 - 5.0 g/dL   Creatine Kinase   Result Value Ref Range    Creatine Kinase 2,635 (H) 0 - 325 U/L   Protime-INR   Result Value Ref Range    Protime 11.1 9.3 - 12.7 seconds    INR 1.0 0.9 - 1.2   Hepatic function panel   Result Value Ref Range    Albumin 3.3 (L) 3.4 - 5.0 g/dL    Bilirubin, Total 0.5 0.0 - 1.2 mg/dL    Bilirubin, Direct 0.1 0.0 - 0.3 mg/dL     Alkaline Phosphatase 60 33 - 120 U/L    ALT 73 (H) 10 - 52 U/L    AST 68 (H) 9 - 39 U/L    Total Protein 5.7 (L) 6.4 - 8.2 g/dL   Lavender Top   Result Value Ref Range    Extra Tube Hold for add-ons.    Creatine Kinase   Result Value Ref Range    Creatine Kinase 1,405 (H) 0 - 325 U/L   Hepatic function panel   Result Value Ref Range    Albumin 3.6 3.4 - 5.0 g/dL    Bilirubin, Total 0.3 0.0 - 1.2 mg/dL    Bilirubin, Direct 0.1 0.0 - 0.3 mg/dL    Alkaline Phosphatase 65 33 - 120 U/L    ALT 69 (H) 10 - 52 U/L    AST 47 (H) 9 - 39 U/L    Total Protein 6.4 6.4 - 8.2 g/dL   Phosphorus   Result Value Ref Range    Phosphorus 4.2 2.5 - 4.9 mg/dL   Basic Metabolic Panel   Result Value Ref Range    Glucose 76 74 - 99 mg/dL    Sodium 138 136 - 145 mmol/L    Potassium 4.2 3.5 - 5.3 mmol/L    Chloride 106 98 - 107 mmol/L    Bicarbonate 27 21 - 32 mmol/L    Anion Gap 9 (L) 10 - 20 mmol/L    Urea Nitrogen 11 6 - 23 mg/dL    Creatinine 1.02 0.50 - 1.30 mg/dL    eGFR >90 >60 mL/min/1.73m*2    Calcium 8.9 8.6 - 10.3 mg/dL   Lavender Top   Result Value Ref Range    Extra Tube Hold for add-ons.     Pertinent labs were reviewed    Scheduled medications  enoxaparin, 40 mg, subcutaneous, q24h  FLUoxetine, 20 mg, oral, Daily  folic acid, 1 mg, oral, Daily  melatonin, 3 mg, oral, Daily  multivitamin with minerals, 1 tablet, oral, Daily  OLANZapine, 15 mg, oral, Nightly  thiamine, 100 mg, oral, Daily      Continuous medications     PRN medications  PRN medications: acetaminophen **OR** acetaminophen, diphenhydrAMINE **OR** diphenhydrAMINE, haloperidol lactate **OR** haloperidol, hydrOXYzine pamoate, LORazepam, ondansetron **OR** ondansetron, polyethylene glycol    Assessment/Plan   Assessment & Plan  Non-traumatic rhabdomyolysis    Per patient:  Psychosis   Bipolar   Schizophrenia   Anxiety     PLAN/ RECOMMENDATIONS:      -Suicidal ideation/Homicidal ideation: Continue sitter. Continue to assess     -Continue Haldol 5 mg po/IM every 6 hours  prn for agitation- Recommend daily EKG monitoring (Qtc 492)  -Continue Benadryl 50 mg po/IM every 6 hours for agitation  -Continue Ativan 0.5 mg IV every 4 hours prn for agitation (started by primary team)  -Continue Prozac 20 mg po daily, goal to increase to 40 mg as tolerated (PTA dose)  -Continue Zyprexa 15 mg po nightly  -Continue Vistaril 50 mg every 6 hours prn for anxiety  -Continue Melatonin 3 mg daily to help regulate sleep/wake cycle     - Patient does currently meet criteria for inpatient psychiatric admission. Once patient is deemed medically cleared, please document in note that patient is MEDICALLY CLEARED and contact [facility] for referral by placing consult for [facility] and calling [phone] ([facility] of hospitals in [place]).        - Patient lacks the capacity to leave AMA at this time and thus cannot leave AMA. Call [facility]IOLET if patient attempts to leave AMA.     - Patient does require a 1:1 sitter from a psychiatric perspective at this time.     -Thank you for allowing us to participate in the care of this patient. Psychiatry will continue to follow.           I spent 26 minutes in the professional and overall care of this patient.      CHUY Smart-CNP

## 2025-03-09 NOTE — NURSING NOTE
Patient's IV was falling out so I removed, messaging Attending to se if they want a new one placed.

## 2025-03-09 NOTE — CARE PLAN
The patient's goals for the shift include rest bathe    The clinical goals for the shift include monitor for aggressive behavior, provide restful environment

## 2025-03-09 NOTE — ASSESSMENT & PLAN NOTE
Nontraumatic rhabdomyolysis  CK 7793, repeat 6275, 5343, 2635 1405-improving  Stop IV fluids per nephrology-stable from nephrology standpoint    Suicidal and homicidal ideation  Suicide and homicide precautions  Sitter at bedside  Consult psych, appreciate recommendations  Will need medically cleared for inpatient psych    EDUARDO-resolved    Hypokalemia-resolved    Transaminitis  ALT//171, 101/138, T. bili 1.4, 69/47 t bili 0.3-stable  Acute hepatitis panel negative  INR normal  Ultrasound of the liver showed slight fatty infiltration of the liver, cholelithiasis without cholecystitis or biliary ductal dilatation.  Patient does have 0.9 x 1.1 x 1.3 cm hyperechoic lesion in left hepatic lobe which may represent hemangioma, recommendation for MRI of the liver on outpatient basis  Cleared by GI    Polysubstance abuse  Reportedly uses methamphetamines, cocaine, and THC  Reports occasional alcohol use, smokes 5 cigarettes a day  Declines nicotine patch  Counseled on cessation  Monitor for signs and symptoms of alcohol withdrawal  Alcohol level negative, tox screen is pending    Leukocytosis-resolved  Chest x-ray negative  UA pending    Plan  DVT prophylaxis: Lovenox  CBC and BMP, CK in the morning      Patient is medically stable for discharge to psych.  Patient does meet criteria for inpatient psych.  Lacks capacity to leave AMA and instruction from psych to call code violet if patient attempts to leave.

## 2025-03-09 NOTE — NURSING NOTE
Attending wants another IV placed and patient asked to wait toll later. He wants to sleep right now.

## 2025-03-10 VITALS
BODY MASS INDEX: 30.48 KG/M2 | RESPIRATION RATE: 16 BRPM | HEART RATE: 60 BPM | HEIGHT: 73 IN | OXYGEN SATURATION: 100 % | TEMPERATURE: 97.5 F | SYSTOLIC BLOOD PRESSURE: 98 MMHG | DIASTOLIC BLOOD PRESSURE: 61 MMHG | WEIGHT: 230 LBS

## 2025-03-10 LAB
ALBUMIN SERPL BCP-MCNC: 3.5 G/DL (ref 3.4–5)
ALP SERPL-CCNC: 63 U/L (ref 33–120)
ALT SERPL W P-5'-P-CCNC: 65 U/L (ref 10–52)
AMPHETAMINES UR QL SCN: ABNORMAL
ANION GAP SERPL CALCULATED.3IONS-SCNC: 9 MMOL/L (ref 10–20)
AST SERPL W P-5'-P-CCNC: 36 U/L (ref 9–39)
BARBITURATES UR QL SCN: ABNORMAL
BENZODIAZ UR QL SCN: ABNORMAL
BILIRUB DIRECT SERPL-MCNC: 0.1 MG/DL (ref 0–0.3)
BILIRUB SERPL-MCNC: 0.3 MG/DL (ref 0–1.2)
BUN SERPL-MCNC: 12 MG/DL (ref 6–23)
BZE UR QL SCN: ABNORMAL
CALCIUM SERPL-MCNC: 8.8 MG/DL (ref 8.6–10.3)
CANNABINOIDS UR QL SCN: ABNORMAL
CHLORIDE SERPL-SCNC: 104 MMOL/L (ref 98–107)
CK SERPL-CCNC: 700 U/L (ref 0–325)
CO2 SERPL-SCNC: 29 MMOL/L (ref 21–32)
CREAT SERPL-MCNC: 1 MG/DL (ref 0.5–1.3)
EGFRCR SERPLBLD CKD-EPI 2021: >90 ML/MIN/1.73M*2
FENTANYL+NORFENTANYL UR QL SCN: ABNORMAL
GLUCOSE SERPL-MCNC: 79 MG/DL (ref 74–99)
HOLD SPECIMEN: NORMAL
METHADONE UR QL SCN: ABNORMAL
OPIATES UR QL SCN: ABNORMAL
OXYCODONE+OXYMORPHONE UR QL SCN: ABNORMAL
PCP UR QL SCN: ABNORMAL
PHOSPHATE SERPL-MCNC: 4.2 MG/DL (ref 2.5–4.9)
POTASSIUM SERPL-SCNC: 3.9 MMOL/L (ref 3.5–5.3)
PROT SERPL-MCNC: 6.7 G/DL (ref 6.4–8.2)
SODIUM SERPL-SCNC: 138 MMOL/L (ref 136–145)

## 2025-03-10 PROCEDURE — 2500000001 HC RX 250 WO HCPCS SELF ADMINISTERED DRUGS (ALT 637 FOR MEDICARE OP): Performed by: NURSE PRACTITIONER

## 2025-03-10 PROCEDURE — 36415 COLL VENOUS BLD VENIPUNCTURE: CPT | Performed by: INTERNAL MEDICINE

## 2025-03-10 PROCEDURE — 2500000004 HC RX 250 GENERAL PHARMACY W/ HCPCS (ALT 636 FOR OP/ED): Performed by: NURSE PRACTITIONER

## 2025-03-10 PROCEDURE — 99231 SBSQ HOSP IP/OBS SF/LOW 25: CPT

## 2025-03-10 PROCEDURE — 99239 HOSP IP/OBS DSCHRG MGMT >30: CPT | Performed by: NURSE PRACTITIONER

## 2025-03-10 PROCEDURE — 84100 ASSAY OF PHOSPHORUS: CPT | Performed by: INTERNAL MEDICINE

## 2025-03-10 PROCEDURE — 82550 ASSAY OF CK (CPK): CPT | Performed by: NURSE PRACTITIONER

## 2025-03-10 PROCEDURE — 82248 BILIRUBIN DIRECT: CPT | Performed by: NURSE PRACTITIONER

## 2025-03-10 PROCEDURE — 80307 DRUG TEST PRSMV CHEM ANLYZR: CPT | Performed by: NURSE PRACTITIONER

## 2025-03-10 PROCEDURE — 84075 ASSAY ALKALINE PHOSPHATASE: CPT | Performed by: NURSE PRACTITIONER

## 2025-03-10 PROCEDURE — 36415 COLL VENOUS BLD VENIPUNCTURE: CPT | Performed by: NURSE PRACTITIONER

## 2025-03-10 PROCEDURE — 2500000001 HC RX 250 WO HCPCS SELF ADMINISTERED DRUGS (ALT 637 FOR MEDICARE OP)

## 2025-03-10 RX ORDER — HYDROXYZINE PAMOATE 50 MG/1
50 CAPSULE ORAL EVERY 6 HOURS PRN
Start: 2025-03-10

## 2025-03-10 RX ORDER — MULTIVIT-MIN/IRON FUM/FOLIC AC 7.5 MG-4
1 TABLET ORAL DAILY
Start: 2025-03-11

## 2025-03-10 RX ORDER — HALOPERIDOL 5 MG/1
5 TABLET ORAL EVERY 6 HOURS PRN
Start: 2025-03-10

## 2025-03-10 RX ORDER — ACETAMINOPHEN 325 MG/1
650 TABLET ORAL EVERY 4 HOURS PRN
Start: 2025-03-10

## 2025-03-10 RX ORDER — TALC
3 POWDER (GRAM) TOPICAL DAILY
Start: 2025-03-10

## 2025-03-10 RX ORDER — OLANZAPINE 15 MG/1
15 TABLET ORAL NIGHTLY
Start: 2025-03-10

## 2025-03-10 RX ORDER — FLUOXETINE HYDROCHLORIDE 20 MG/1
20 CAPSULE ORAL DAILY
Start: 2025-03-11

## 2025-03-10 RX ADMIN — FLUOXETINE HYDROCHLORIDE 20 MG: 20 CAPSULE ORAL at 08:35

## 2025-03-10 RX ADMIN — FOLIC ACID 1 MG: 1 TABLET ORAL at 08:35

## 2025-03-10 RX ADMIN — Medication 1 TABLET: at 08:35

## 2025-03-10 RX ADMIN — Medication 100 MG: at 08:35

## 2025-03-10 ASSESSMENT — COLUMBIA-SUICIDE SEVERITY RATING SCALE - C-SSRS
5. HAVE YOU STARTED TO WORK OUT OR WORKED OUT THE DETAILS OF HOW TO KILL YOURSELF? DO YOU INTEND TO CARRY OUT THIS PLAN?: NO
6. HAVE YOU EVER DONE ANYTHING, STARTED TO DO ANYTHING, OR PREPARED TO DO ANYTHING TO END YOUR LIFE?: NO
1. SINCE LAST CONTACT, HAVE YOU WISHED YOU WERE DEAD OR WISHED YOU COULD GO TO SLEEP AND NOT WAKE UP?: YES
2. HAVE YOU ACTUALLY HAD ANY THOUGHTS OF KILLING YOURSELF?: YES

## 2025-03-10 ASSESSMENT — COGNITIVE AND FUNCTIONAL STATUS - GENERAL
MOBILITY SCORE: 24
DAILY ACTIVITIY SCORE: 24

## 2025-03-10 ASSESSMENT — PAIN SCALES - GENERAL: PAINLEVEL_OUTOF10: 0 - NO PAIN

## 2025-03-10 NOTE — PROGRESS NOTES
1030 EPAT consulted for pt who is on medical floor. Pt will be referred for inpatient placement for SI, HI and symptoms of depression.   Pt's UA and drug screen were never completed upon orders and will need to be resulted for placement. Providers have been informed and will attempt to get urine sample from pt. [place] slip also being filled out by psych NP.     1048 Faxed clinicals to [facility] for review for admission pending UA & drug screen results.    1148 [facility], spoke with Jeanine who inquired about tox screen which still needs collected per lab reports.   [facility] can accept pt once they receive the tox screen results.   [facility] will fax once completed.     1445 Spoke with Jeanine who was requesting update on tox screen. Still no update for them at this time as we are waiting for pt to provide sample.   [facility] provided accepting at this time but will not take report until tox screen completed.  Pt accepted by Dr. Julio for 1600 unit and [facility] can be called to [phone] once tox screen and completed pink are faxed over.     1645 Tox screen complete. Faxed results and addressed pink slip to [facility]. Providing RN staff with packet and N2N information for report.

## 2025-03-10 NOTE — PROGRESS NOTES
Nathan Vaughn is a 39 y.o. male on day 3 of admission presenting with Non-traumatic rhabdomyolysis.      Subjective   Patient seen and examined.  Patient w/o new c/o. Sitter at bedside. Declined labs per nursing, he agrees to blood work now.   Objective     Last Recorded Vitals  BP 98/61 (BP Location: Right arm, Patient Position: Lying) Comment (Patient Position): side  Pulse 60   Temp 36.4 °C (97.5 °F) (Temporal)   Resp 16   Wt 104 kg (230 lb)   SpO2 100%   Intake/Output last 3 Shifts:    Intake/Output Summary (Last 24 hours) at 3/10/2025 0829  Last data filed at 3/9/2025 1700  Gross per 24 hour   Intake 480 ml   Output --   Net 480 ml       Admission Weight  Weight: 104 kg (230 lb) (03/06/25 1940)    Daily Weight  03/06/25 : 104 kg (230 lb)    Image Results  ECG 12 lead  Sinus tachycardia  QTcB >= 480 msec  Abnormal ECG  When compared with ECG of 03-NOV-2024 17:37,  No significant change was found  Confirmed by Moe Mcfarlane ([id number]) on 3/7/2025 12:43:06 PM  US abdomen limited liver  Narrative: Interpreted By:  Lucy Zheng,   STUDY:  US ABDOMEN LIMITED LIVER; 9:23 am      INDICATION:  Transaminitis      COMPARISON:  None.      ACCESSION NUMBER(S):  [id number]      ORDERING CLINICIAN:  MILA RICHARD      TECHNIQUE:  Limited abdominal ultrasound of the right upper quadrant was  performed utilizing gray scale imaging.      FINDINGS:  The study is limited by the large body habitus of this individual and  overlying bowel gas. The patient is also sedated.      Liver: The liver is of normal-size. There is a mild increase in  echogenicity of the hepatic parenchyma suggesting mild fatty  infiltration of the liver. A 0.9 x 1.1 x 1.3 cm hyperechoic lesion is  seen within left hepatic lobe. This could represent hemangioma.  Gallbladder: Cholelithiasis is present. There is also sludge seen in  the gallbladder lumen. The gallbladder wall is at the upper limits of  normal in thickness at 3 mm. Sonographic Kelly's sign:  Negative  Pancreas: Pancreas is obscured from visualization.  CBD: 4 mm      The right kidney is normal in size measuring 10.9 cm in length  without hydronephrosis.      Impression: Cholelithiasis without cholecystitis or biliary ductal dilatation.      Slight fatty infiltration of the liver.      0.9 x 1.1 x 1.3 cm hyperechoic lesion in left hepatic lobe. This may  represent hemangioma. I would recommend MRI of the liver on an  outpatient basis for further assessment.      Unremarkable appearance of right kidney.      Nonvisualization of pancreas.      MACRO:  None.      Signed by: Lucy Zheng 3/7/2025 9:36 AM  Dictation workstation:   [id number]  XR chest 1 view  Narrative: Interpreted By:  Lucy Zheng,   STUDY:  XR CHEST 1 VIEW 3/7/2025 8:12 am      INDICATION:  Signs/Symptoms:Leukocytosis      COMPARISON:  02/08/2020      ACCESSION NUMBER(S):  [id number]      ORDERING CLINICIAN:  MILA RICHARD      TECHNIQUE:  AP semi-erect view of the chest      FINDINGS:  The cardiac size is accentuated by the low lung volumes and AP  projection. There is crowding of the bronchovascular markings as a  result of the low lung volumes. No airspace consolidation or pleural  abnormality is identified.      Impression: Low lung volumes without acute cardiopulmonary process.      Signed by: Lucy Zheng 3/7/2025 8:25 AM  Dictation workstation:   [id number]      Physical Exam  Vitals reviewed.   Constitutional:       Comments: Drowsy, awakens easily.  Withdrawn.   HENT:      Head: Normocephalic and atraumatic.   Eyes:      Extraocular Movements: Extraocular movements intact.      Conjunctiva/sclera: Conjunctivae normal.   Cardiovascular:      Rate and Rhythm: Normal rate.   Pulmonary:      Effort: Pulmonary effort is normal.   Abdominal:      General: There is no distension.      Palpations: Abdomen is soft.   Skin:     General: Skin is warm and dry.   Neurological:      General: No focal deficit present.      Mental Status: He is  oriented to person, place, and time.         Relevant Results  Lab Results   Component Value Date    GLUCOSE 76 03/09/2025    CALCIUM 8.9 03/09/2025     03/09/2025    K 4.2 03/09/2025    CO2 27 03/09/2025     03/09/2025    BUN 11 03/09/2025    CREATININE 1.02 03/09/2025      Lab Results   Component Value Date    WBC 6.8 03/07/2025    HGB 12.5 (L) 03/07/2025    HCT 36.7 (L) 03/07/2025    MCV 82 03/07/2025     03/07/2025    ECG 12 lead  Result Date: 3/7/2025  Sinus tachycardia QTcB >= 480 msec Abnormal ECG When compared with ECG of 03-NOV-2024 17:37, No significant change was found Confirmed by Moe Mcfarlane ([id number]) on 3/7/2025 12:43:06 PM    US abdomen limited liver  Result Date: 3/7/2025  Cholelithiasis without cholecystitis or biliary ductal dilatation.   Slight fatty infiltration of the liver.   0.9 x 1.1 x 1.3 cm hyperechoic lesion in left hepatic lobe. This may represent hemangioma. I would recommend MRI of the liver on an outpatient basis for further assessment.   Unremarkable appearance of right kidney.   Nonvisualization of pancreas.   MACRO: None.   Signed by: Lucy Zheng 3/7/2025 9:36 AM Dictation workstation:   [id number]    XR chest 1 view  Result Date: 3/7/2025  Low lung volumes without acute cardiopulmonary process.   Signed by: Lucy Zheng 3/7/2025 8:25 AM Dictation workstation:   [id number]    XR chest 2 views  Result Date: 2/24/2025  IMPRESSION: No acute radiographic abnormality. : PSCB   Transcribe Date/Time: Feb 24 2025  8:58A Dictated by : SHERIN BERMUDEZ MD This examination was interpreted and the report reviewed and electronically signed by: SHERIN BERMUDEZ MD on Feb 24 2025  8:58AM  EST         Assessment/Plan      Assessment & Plan  Non-traumatic rhabdomyolysis  Nontraumatic rhabdomyolysis  CK 7793, repeat 6275, 5343, 2635 1405-improving  Stop IV fluids per nephrology-stable from nephrology standpoint  Will follow up with morning labs.    Suicidal and  homicidal ideation  Suicide and homicide precautions  Sitter at bedside  Consult psych, appreciate recommendations  Patient medically cleared for inpatient psych    EDUARDO-resolved    Hypokalemia-resolved    Transaminitis  ALT//171, 101/138, T. bili 1.4, 69/47 t bili 0.3-stable  Acute hepatitis panel negative  INR normal  Ultrasound of the liver showed slight fatty infiltration of the liver, cholelithiasis without cholecystitis or biliary ductal dilatation.  Patient does have 0.9 x 1.1 x 1.3 cm hyperechoic lesion in left hepatic lobe which may represent hemangioma, recommendation for MRI of the liver on outpatient basis  Cleared by GI    Polysubstance abuse  Reportedly uses methamphetamines, cocaine, and THC  Reports occasional alcohol use, smokes 5 cigarettes a day  Declines nicotine patch  Counseled on cessation  Monitor for signs and symptoms of alcohol withdrawal  Alcohol level negative, tox screen is pending    Leukocytosis-resolved  Chest x-ray negative  UA pending    Plan  DVT prophylaxis: Lovenox  CBC and BMP, CK in the morning      Patient is medically stable for discharge to psych.  Patient does meet criteria for inpatient psych.  Lacks capacity to leave AMA and instruction from psych to call code violet if patient attempts to leave.    Will follow up with morning labs, trending CK    Amber White, APRN-CNP

## 2025-03-10 NOTE — ASSESSMENT & PLAN NOTE
Nontraumatic rhabdomyolysis  CK 7793, repeat 6275, 5343, 2635 1405-improving  Stop IV fluids per nephrology-stable from nephrology standpoint  Will follow up with morning labs.    Suicidal and homicidal ideation  Suicide and homicide precautions  Sitter at bedside  Consult psych, appreciate recommendations  Patient medically cleared for inpatient psych    EDUARDO-resolved    Hypokalemia-resolved    Transaminitis  ALT//171, 101/138, T. bili 1.4, 69/47 t bili 0.3-stable  Acute hepatitis panel negative  INR normal  Ultrasound of the liver showed slight fatty infiltration of the liver, cholelithiasis without cholecystitis or biliary ductal dilatation.  Patient does have 0.9 x 1.1 x 1.3 cm hyperechoic lesion in left hepatic lobe which may represent hemangioma, recommendation for MRI of the liver on outpatient basis  Cleared by GI    Polysubstance abuse  Reportedly uses methamphetamines, cocaine, and THC  Reports occasional alcohol use, smokes 5 cigarettes a day  Declines nicotine patch  Counseled on cessation  Monitor for signs and symptoms of alcohol withdrawal  Alcohol level negative, tox screen is pending    Leukocytosis-resolved  Chest x-ray negative  UA pending    Plan  DVT prophylaxis: Lovenox  CBC and BMP, CK in the morning      Patient is medically stable for discharge to psych.  Patient does meet criteria for inpatient psych.  Lacks capacity to leave AMA and instruction from psych to call code violet if patient attempts to leave.

## 2025-03-10 NOTE — CARE PLAN
Problem: Risk for Suicide  Goal: Accepts medications as prescribed/needed this shift  Outcome: Progressing  Goal: Identifies supports this shift  Outcome: Progressing  Goal: Makes needs known through verbalization or behaviors this shift  Outcome: Met  Goal: No self harm this shift  Outcome: Met  Goal: Read Safety Guidelines this shift  Outcome: Progressing  Goal: Complete Mental Health Safety Plan (psychiatry only) this shift  Outcome: Progressing   The patient's goals for the shift include rest bathe    The clinical goals for the shift include monitor for any suicidal and agressive behavior

## 2025-03-10 NOTE — PROGRESS NOTES
I was following this patient for a mild acute renal failure and rhabdomyolysis.  His rhabdomyolysis has improved significantly, almost totally cleared with IV fluids.  The acute renal failure resolved.  I have no objection to discharge from a nephrologic standpoint.  No need for nephrology follow-up.

## 2025-03-10 NOTE — CARE PLAN
The patient's goals for the shift include rest bathe    The clinical goals for the shift include monitor for any suicidal and agressive behavior      Problem: Risk for Suicide  Goal: Accepts medications as prescribed/needed this shift  Outcome: Progressing  Goal: Identifies supports this shift  Outcome: Progressing  Goal: Read Safety Guidelines this shift  Outcome: Progressing  Goal: Complete Mental Health Safety Plan (psychiatry only) this shift  Outcome: Progressing     Problem: Skin  Goal: Decreased wound size/increased tissue granulation at next dressing change  Outcome: Progressing  Goal: Participates in plan/prevention/treatment measures  Outcome: Progressing  Goal: Prevent/manage excess moisture  Outcome: Progressing  Goal: Prevent/minimize sheer/friction injuries  Outcome: Progressing  Goal: Promote/optimize nutrition  Outcome: Progressing  Goal: Promote skin healing  Outcome: Progressing     Problem: Pain - Adult  Goal: Verbalizes/displays adequate comfort level or baseline comfort level  Outcome: Progressing     Problem: Safety - Adult  Goal: Free from fall injury  Outcome: Progressing     Problem: Discharge Planning  Goal: Discharge to home or other facility with appropriate resources  Outcome: Progressing     Problem: Chronic Conditions and Co-morbidities  Goal: Patient's chronic conditions and co-morbidity symptoms are monitored and maintained or improved  Outcome: Progressing     Problem: Nutrition  Goal: Nutrient intake appropriate for maintaining nutritional needs  Outcome: Progressing

## 2025-03-10 NOTE — SIGNIFICANT EVENT
03/09/25 2140   Provider Notification   Reason for Communication Other (Comment)   Provider Name Alejandrina Loya APRN   Provider Role Nurse Practitioner   Method of Communication Secure Text   Details of Communication patient refusing Tele does not want to wear it and keeps taking it off.   Response No new orders   Notification Time 2200

## 2025-03-10 NOTE — PROGRESS NOTES
"Nathan Vaughn is a 39 y.o. male on day 3 of admission presenting with Non-traumatic rhabdomyolysis.      Subjective   Patient’s chart was reviewed, and case was discussed with the nursing staff. Nursing reported that patient compliant with his medications. No agitation/combative behavior reported by nursing.   Patient was seen in his assigned room. Sitter at bedside. He reported feeling depressed and continues to experience suicidal ideation, though he does not have a specific plan. He also endorsed homicidal ideation without a specific target. Denies AH/VH. Patient stated that he slept well last night and ate breakfast this morning.  His CK level has decreased to 700. The primary team has determined that he is medically stable for discharge to the psychiatric unit. Case discussed with EPAT SW and initiated the EPAT process. Updated (secure chat message sent to) primary team NP.     Objective     Last Recorded Vitals  Blood pressure 98/61, pulse 60, temperature 36.4 °C (97.5 °F), temperature source Temporal, resp. rate 16, height 1.854 m (6' 1\"), weight 104 kg (230 lb), SpO2 100%.    Review of Systems    Psychiatric ROS - Adult  Depression: sleep disturbance: difficulty falling asleep and frequent awakening and markedly diminished interest or pleasure in all or most activities  Anxiety: restlessness or feeling keyed up or on edge, difficulty concentrating, and irritability  Gely:  reportedly, patient had rapid/pressured speech in the ED  Psychosis: homicidal ideation  Delirium: negative   Trauma: negative     Physical Exam     Mental Status Exam  General: 39 years old male, lying in bed comfortably during interview.  Appearance: Appeared as age stated; fairly dressed/groomed.  Attitude: cooperative, calm during encounter  Behavior: Fair EC; overall responding appropriately  Motor Activity: No notable natasha PMAR  Speech: Clear, with fair phonation, and no lisp nor dysarthria.   Mood: \"depressed\"  Affect: " Restricted and guarded; decreased range/intensity, but overall appropriate and congruent  Thought Content: He continues to endorse suicidal ideation without a specific plan, as well as generalized homicidal ideation without a specific target.    Thought Perception: Did not appear to be responding to internal stimuli. Not endorsing AVH-during interview  Cognition: Grossly intact; A&O x4/4 to self, place, date, and context.  Insight: fair  Judgement: Fair, compliant with prescribed medications     Psychiatric Risk Assessment  Violence Risk Factors:  male, current psychiatric illness, past history of violence, substance abuse , unemployed, violent or homicidal ideation, and stress/destabilizers  Acute Risk of Harm to Others is Considered: Moderate  Suicide Risk Factors: male, prior suicide attempts , lives alone or lack of social support, current psychiatric illness, substance abuse , lack of treatment access, discontinuities in treatment, or recent discharge from hospital, and nonadherence to medication treatment for psychosis   Protective Factors: social support/connectedness  Acute Risk of Harm to Self is Considered: High      Relevant Results   Results for orders placed or performed during the hospital encounter of 03/06/25 (from the past 96 hours)   CBC and Auto Differential   Result Value Ref Range    WBC 12.1 (H) 4.4 - 11.3 x10*3/uL    nRBC 0.0 0.0 - 0.0 /100 WBCs    RBC 4.58 4.50 - 5.90 x10*6/uL    Hemoglobin 12.7 (L) 13.5 - 17.5 g/dL    Hematocrit 37.3 (L) 41.0 - 52.0 %    MCV 81 80 - 100 fL    MCH 27.7 26.0 - 34.0 pg    MCHC 34.0 32.0 - 36.0 g/dL    RDW 12.9 11.5 - 14.5 %    Platelets 463 (H) 150 - 450 x10*3/uL    Neutrophils % 58.8 40.0 - 80.0 %    Immature Granulocytes %, Automated 0.2 0.0 - 0.9 %    Lymphocytes % 28.8 13.0 - 44.0 %    Monocytes % 11.7 2.0 - 10.0 %    Eosinophils % 0.3 0.0 - 6.0 %    Basophils % 0.2 0.0 - 2.0 %    Neutrophils Absolute 7.10 1.20 - 7.70 x10*3/uL    Immature Granulocytes  Absolute, Automated 0.03 0.00 - 0.70 x10*3/uL    Lymphocytes Absolute 3.49 1.20 - 4.80 x10*3/uL    Monocytes Absolute 1.41 (H) 0.10 - 1.00 x10*3/uL    Eosinophils Absolute 0.04 0.00 - 0.70 x10*3/uL    Basophils Absolute 0.03 0.00 - 0.10 x10*3/uL   Comprehensive metabolic panel   Result Value Ref Range    Glucose 116 (H) 74 - 99 mg/dL    Sodium 134 (L) 136 - 145 mmol/L    Potassium 3.3 (L) 3.5 - 5.3 mmol/L    Chloride 98 98 - 107 mmol/L    Bicarbonate 22 21 - 32 mmol/L    Anion Gap 17 10 - 20 mmol/L    Urea Nitrogen 21 6 - 23 mg/dL    Creatinine 1.36 (H) 0.50 - 1.30 mg/dL    eGFR 69 >60 mL/min/1.73m*2    Calcium 9.2 8.6 - 10.3 mg/dL    Albumin 4.6 3.4 - 5.0 g/dL    Alkaline Phosphatase 76 33 - 120 U/L    Total Protein 7.7 6.4 - 8.2 g/dL     (H) 9 - 39 U/L    Bilirubin, Total 1.5 (H) 0.0 - 1.2 mg/dL     (H) 10 - 52 U/L   Acute Toxicology Panel, Blood   Result Value Ref Range    Acetaminophen <10.0 10.0 - 30.0 ug/mL    Salicylate  <3 4 - 20 mg/dL    Alcohol <10 <=10 mg/dL   Creatine Kinase   Result Value Ref Range    Creatine Kinase 7,793 (H) 0 - 325 U/L   ECG 12 lead   Result Value Ref Range    Ventricular Rate 115 BPM    Atrial Rate 115 BPM    WA Interval 144 ms    QRS Duration 74 ms    QT Interval 356 ms    QTC Calculation(Bazett) 492 ms    P Axis 24 degrees    R Axis 60 degrees    T Axis 48 degrees    QRS Count 19 beats    Q Onset 220 ms    T Offset 398 ms    QTC Fredericia 442 ms   Sars-CoV-2, Influenza A/B and RSV PCR   Result Value Ref Range    Coronavirus 2019, PCR Not Detected Not Detected    Flu A Result Not Detected Not Detected    Flu B Result Not Detected Not Detected    RSV PCR Not Detected Not Detected   Creatine Kinase   Result Value Ref Range    Creatine Kinase 6,275 (H) 0 - 325 U/L   Magnesium   Result Value Ref Range    Magnesium 2.39 1.60 - 2.40 mg/dL   Comprehensive metabolic panel   Result Value Ref Range    Glucose 86 74 - 99 mg/dL    Sodium 136 136 - 145 mmol/L    Potassium 3.7 3.5  - 5.3 mmol/L    Chloride 103 98 - 107 mmol/L    Bicarbonate 24 21 - 32 mmol/L    Anion Gap 13 10 - 20 mmol/L    Urea Nitrogen 19 6 - 23 mg/dL    Creatinine 1.20 0.50 - 1.30 mg/dL    eGFR 80 >60 mL/min/1.73m*2    Calcium 8.4 (L) 8.6 - 10.3 mg/dL    Albumin 3.9 3.4 - 5.0 g/dL    Alkaline Phosphatase 66 33 - 120 U/L    Total Protein 7.2 6.4 - 8.2 g/dL     (H) 9 - 39 U/L    Bilirubin, Total 1.4 (H) 0.0 - 1.2 mg/dL     (H) 10 - 52 U/L   Hepatitis panel, acute   Result Value Ref Range    Hepatitis B Surface AG Nonreactive Nonreactive    Hepatitis A  AB- IgM Nonreactive Nonreactive    Hepatitis B Core AB; IgM Nonreactive Nonreactive    Hepatitis C AB Nonreactive Nonreactive   CBC   Result Value Ref Range    WBC 6.8 4.4 - 11.3 x10*3/uL    nRBC 0.0 0.0 - 0.0 /100 WBCs    RBC 4.49 (L) 4.50 - 5.90 x10*6/uL    Hemoglobin 12.5 (L) 13.5 - 17.5 g/dL    Hematocrit 36.7 (L) 41.0 - 52.0 %    MCV 82 80 - 100 fL    MCH 27.8 26.0 - 34.0 pg    MCHC 34.1 32.0 - 36.0 g/dL    RDW 12.7 11.5 - 14.5 %    Platelets 408 150 - 450 x10*3/uL   Creatine Kinase   Result Value Ref Range    Creatine Kinase 5,343 (H) 0 - 325 U/L   Renal Function Panel   Result Value Ref Range    Glucose 80 74 - 99 mg/dL    Sodium 139 136 - 145 mmol/L    Potassium 4.2 3.5 - 5.3 mmol/L    Chloride 109 (H) 98 - 107 mmol/L    Bicarbonate 24 21 - 32 mmol/L    Anion Gap 10 10 - 20 mmol/L    Urea Nitrogen 11 6 - 23 mg/dL    Creatinine 0.91 0.50 - 1.30 mg/dL    eGFR >90 >60 mL/min/1.73m*2    Calcium 8.1 (L) 8.6 - 10.3 mg/dL    Phosphorus 3.5 2.5 - 4.9 mg/dL    Albumin 3.2 (L) 3.4 - 5.0 g/dL   Creatine Kinase   Result Value Ref Range    Creatine Kinase 2,635 (H) 0 - 325 U/L   Protime-INR   Result Value Ref Range    Protime 11.1 9.3 - 12.7 seconds    INR 1.0 0.9 - 1.2   Hepatic function panel   Result Value Ref Range    Albumin 3.3 (L) 3.4 - 5.0 g/dL    Bilirubin, Total 0.5 0.0 - 1.2 mg/dL    Bilirubin, Direct 0.1 0.0 - 0.3 mg/dL    Alkaline Phosphatase 60 33 - 120  U/L    ALT 73 (H) 10 - 52 U/L    AST 68 (H) 9 - 39 U/L    Total Protein 5.7 (L) 6.4 - 8.2 g/dL   Lavender Top   Result Value Ref Range    Extra Tube Hold for add-ons.    Creatine Kinase   Result Value Ref Range    Creatine Kinase 1,405 (H) 0 - 325 U/L   Hepatic function panel   Result Value Ref Range    Albumin 3.6 3.4 - 5.0 g/dL    Bilirubin, Total 0.3 0.0 - 1.2 mg/dL    Bilirubin, Direct 0.1 0.0 - 0.3 mg/dL    Alkaline Phosphatase 65 33 - 120 U/L    ALT 69 (H) 10 - 52 U/L    AST 47 (H) 9 - 39 U/L    Total Protein 6.4 6.4 - 8.2 g/dL   Phosphorus   Result Value Ref Range    Phosphorus 4.2 2.5 - 4.9 mg/dL   Basic Metabolic Panel   Result Value Ref Range    Glucose 76 74 - 99 mg/dL    Sodium 138 136 - 145 mmol/L    Potassium 4.2 3.5 - 5.3 mmol/L    Chloride 106 98 - 107 mmol/L    Bicarbonate 27 21 - 32 mmol/L    Anion Gap 9 (L) 10 - 20 mmol/L    Urea Nitrogen 11 6 - 23 mg/dL    Creatinine 1.02 0.50 - 1.30 mg/dL    eGFR >90 >60 mL/min/1.73m*2    Calcium 8.9 8.6 - 10.3 mg/dL   Lavender Top   Result Value Ref Range    Extra Tube Hold for add-ons.    Phosphorus   Result Value Ref Range    Phosphorus 4.2 2.5 - 4.9 mg/dL   Creatine Kinase   Result Value Ref Range    Creatine Kinase 700 (H) 0 - 325 U/L   Comprehensive metabolic panel   Result Value Ref Range    Glucose 79 74 - 99 mg/dL    Sodium 138 136 - 145 mmol/L    Potassium 3.9 3.5 - 5.3 mmol/L    Chloride 104 98 - 107 mmol/L    Bicarbonate 29 21 - 32 mmol/L    Anion Gap 9 (L) 10 - 20 mmol/L    Urea Nitrogen 12 6 - 23 mg/dL    Creatinine 1.00 0.50 - 1.30 mg/dL    eGFR >90 >60 mL/min/1.73m*2    Calcium 8.8 8.6 - 10.3 mg/dL    Albumin 3.5 3.4 - 5.0 g/dL    Alkaline Phosphatase 63 33 - 120 U/L    Total Protein 6.7 6.4 - 8.2 g/dL    AST 36 9 - 39 U/L    Bilirubin, Total 0.3 0.0 - 1.2 mg/dL    ALT 65 (H) 10 - 52 U/L   Bilirubin, Direct   Result Value Ref Range    Bilirubin, Direct 0.1 0.0 - 0.3 mg/dL   Lavender Top   Result Value Ref Range    Extra Tube Hold for add-ons.     Pertinent labs were reviewed    Scheduled medications  enoxaparin, 40 mg, subcutaneous, q24h  FLUoxetine, 20 mg, oral, Daily  folic acid, 1 mg, oral, Daily  melatonin, 3 mg, oral, Daily  multivitamin with minerals, 1 tablet, oral, Daily  OLANZapine, 15 mg, oral, Nightly  thiamine, 100 mg, oral, Daily      Continuous medications     PRN medications  PRN medications: acetaminophen **OR** acetaminophen, diphenhydrAMINE **OR** diphenhydrAMINE, haloperidol lactate **OR** haloperidol, hydrOXYzine pamoate, LORazepam, ondansetron **OR** ondansetron, polyethylene glycol         Assessment/Plan   Assessment & Plan  Non-traumatic rhabdomyolysis    Per patient:  Psychosis   Bipolar   Schizophrenia   Anxiety     PLAN/ RECOMMENDATIONS:      -Suicidal ideation/Homicidal ideation: Continue sitter. Continue to assess     -Continue Haldol 5 mg po/IM every 6 hours prn for agitation- Recommend daily EKG monitoring (Qtc 492)  -Continue Benadryl 50 mg po/IM every 6 hours for agitation  -Continue Ativan 0.5 mg IV every 4 hours prn for agitation (started by primary team)  -Continue Prozac 20 mg po daily, goal to increase to 40 mg as tolerated (PTA dose)  -Continue Zyprexa 15 mg po nightly  -Continue Vistaril 50 mg every 6 hours prn for anxiety  -Continue Melatonin 3 mg daily to help regulate sleep/wake cycle     - Patient does currently meet criteria for inpatient psychiatric admission.     -Primary team has determined that he is medically stable for discharge to the psychiatric unit. Case discussed with EPAT SW and initiated the EPAT process.       - Patient lacks the capacity to leave AMA at this time and thus cannot leave AMA. Call CODE VIOLET if patient attempts to leave AMA.     - Patient does require a 1:1 sitter from a psychiatric perspective at this time.     -Thank you for allowing us to participate in the care of this patient. Psychiatry will continue to follow.       I spent 39 minutes in the professional and overall care of  this patient.      CHUY Smart-CNP

## 2025-03-10 NOTE — DISCHARGE SUMMARY
Discharge Diagnosis  Non-traumatic rhabdomyolysis    Issues Requiring Follow-Up  Follow up with PCP, nephrology and GI     Discharge Meds     Medication List      START taking these medications     acetaminophen 325 mg tablet; Commonly known as: Tylenol; Take 2 tablets   (650 mg) by mouth every 4 hours if needed for moderate pain (4 - 6).   haloperidol 5 mg tablet; Commonly known as: Haldol; Take 1 tablet (5 mg)   by mouth every 6 hours if needed for agitation.   melatonin 3 mg tablet; Take 1 tablet (3 mg) by mouth once daily.   multivitamin with minerals tablet; Take 1 tablet by mouth once daily.;   Start taking on: March 11, 2025     CHANGE how you take these medications     FLUoxetine 20 mg capsule; Commonly known as: PROzac; Take 1 capsule (20   mg) by mouth once daily.; Start taking on: March 11, 2025; What changed:   medication strength, how much to take, Another medication with the same   name was removed. Continue taking this medication, and follow the   directions you see here.   hydrOXYzine pamoate 50 mg capsule; Commonly known as: Vistaril; Take 1   capsule (50 mg) by mouth every 6 hours if needed for anxiety (1st line).;   What changed: medication strength, how much to take, when to take this,   reasons to take this   OLANZapine 15 mg tablet; Commonly known as: ZyPREXA; Take 1 tablet (15   mg) by mouth once daily at bedtime.; What changed: Another medication with   the same name was removed. Continue taking this medication, and follow the   directions you see here.     STOP taking these medications     ARIPiprazole 10 mg tablet; Commonly known as: Abilify   buPROPion  mg 24 hr tablet; Commonly known as: Wellbutrin XL   hydrOXYzine HCL 50 mg tablet; Commonly known as: Atarax   sertraline 50 mg tablet; Commonly known as: Zoloft       Test Results Pending At Discharge  Pending Labs       No current pending labs.            Hospital Course   Nathan Vaughn is a 39 y.o. male with a past medical history  of polysubstance abuse, cellulitis, renal disease, thrombocytosis, homicidal thoughts, and psychosis who presented to the emergency department with complaints of suicidal and homicidal ideation.  Patient reportedly uses methamphetamines, cocaine, and THC.  Reports occasional alcohol use.  Smokes 5 cigarettes a day.  Declines nicotine patch.  He initially presented to [facility] to be evaluated for mental health.  There was no staff available to sign a pink slip so they called EMS and patient was brought to the emergency department.  Denies chest pain, shortness of breath, fevers, chills, nausea, or vomiting. No abdominal discomfort. Denies lightheadedness or dizziness. No dysuria.  Patient denied any specific suicide or homicidal plan.  He does report active suicidal and homicidal thoughts.  Patient was found to be in rhabdomyolysis the ED.  He did become agitated and behaviors began to escalate in the ED.  He was given IM Haldol, Ativan, and Benadryl which was effective.     Patient was seen and evaluated by nephrology, function and rhabdomyolysis improved with IV fluid.  Patient was seen and evaluated by behavioral health, medications were adjusted.  Patient was pink slipped and accepted at inpatient psych.  I spent over 35 minutes in professional and over care of this patient.  Pertinent Physical Exam At Time of Discharge  Physical Exam  Vitals reviewed.   Constitutional:       Appearance: He is ill-appearing.   HENT:      Head: Normocephalic.      Nose: Nose normal.      Mouth/Throat:      Mouth: Mucous membranes are moist.   Eyes:      Extraocular Movements: Extraocular movements intact.   Cardiovascular:      Rate and Rhythm: Regular rhythm.   Pulmonary:      Effort: Pulmonary effort is normal.   Abdominal:      General: Bowel sounds are normal.   Musculoskeletal:         General: Normal range of motion.      Cervical back: Neck supple.   Skin:     General: Skin is warm.   Neurological:      Mental  Status: He is alert. Mental status is at baseline.         Outpatient Follow-Up  No future appointments.      Amber White, APRN-CNP

## 2025-03-10 NOTE — PROGRESS NOTES
03/10/25 1115   Discharge Planning   Expected Discharge Disposition Psych     Once medically cleared patient to be placed by EPAT

## 2025-04-21 ENCOUNTER — CLINICAL SUPPORT (OUTPATIENT)
Dept: EMERGENCY MEDICINE | Facility: HOSPITAL | Age: 40
End: 2025-04-21
Payer: MEDICARE

## 2025-04-21 ENCOUNTER — HOSPITAL ENCOUNTER (EMERGENCY)
Facility: HOSPITAL | Age: 40
Discharge: PSYCHIATRIC OR SUBSTANCE USE FACILITY | End: 2025-04-21
Attending: STUDENT IN AN ORGANIZED HEALTH CARE EDUCATION/TRAINING PROGRAM
Payer: MEDICARE

## 2025-04-21 VITALS
WEIGHT: 240 LBS | TEMPERATURE: 97.8 F | DIASTOLIC BLOOD PRESSURE: 69 MMHG | SYSTOLIC BLOOD PRESSURE: 104 MMHG | HEIGHT: 70 IN | BODY MASS INDEX: 34.36 KG/M2 | HEART RATE: 74 BPM | RESPIRATION RATE: 16 BRPM | OXYGEN SATURATION: 98 %

## 2025-04-21 DIAGNOSIS — R45.851 SUICIDAL IDEATION: Primary | ICD-10-CM

## 2025-04-21 DIAGNOSIS — R45.850 HOMICIDAL IDEATION: ICD-10-CM

## 2025-04-21 DIAGNOSIS — R44.0 AUDITORY HALLUCINATIONS: ICD-10-CM

## 2025-04-21 LAB
ALBUMIN SERPL BCP-MCNC: 4.4 G/DL (ref 3.4–5)
ALP SERPL-CCNC: 82 U/L (ref 33–120)
ALT SERPL W P-5'-P-CCNC: 23 U/L (ref 10–52)
AMPHETAMINES UR QL SCN: ABNORMAL
ANION GAP SERPL CALC-SCNC: 12 MMOL/L (ref 10–20)
APAP SERPL-MCNC: <10 UG/ML (ref ?–30)
AST SERPL W P-5'-P-CCNC: 20 U/L (ref 9–39)
ATRIAL RATE: 69 BPM
BARBITURATES UR QL SCN: ABNORMAL
BASOPHILS # BLD AUTO: 0.05 X10*3/UL (ref 0–0.1)
BASOPHILS NFR BLD AUTO: 0.8 %
BENZODIAZ UR QL SCN: ABNORMAL
BILIRUB SERPL-MCNC: 0.5 MG/DL (ref 0–1.2)
BUN SERPL-MCNC: 12 MG/DL (ref 6–23)
BZE UR QL SCN: ABNORMAL
CALCIUM SERPL-MCNC: 9.7 MG/DL (ref 8.6–10.6)
CANNABINOIDS UR QL SCN: ABNORMAL
CHLORIDE SERPL-SCNC: 102 MMOL/L (ref 98–107)
CK SERPL-CCNC: 434 U/L (ref 0–325)
CO2 SERPL-SCNC: 28 MMOL/L (ref 21–32)
CREAT SERPL-MCNC: 1.09 MG/DL (ref 0.5–1.3)
EGFRCR SERPLBLD CKD-EPI 2021: 89 ML/MIN/1.73M*2
EOSINOPHIL # BLD AUTO: 0.2 X10*3/UL (ref 0–0.7)
EOSINOPHIL NFR BLD AUTO: 3.3 %
ERYTHROCYTE [DISTWIDTH] IN BLOOD BY AUTOMATED COUNT: 12.9 % (ref 11.5–14.5)
ETHANOL SERPL-MCNC: <10 MG/DL
FENTANYL+NORFENTANYL UR QL SCN: ABNORMAL
GLUCOSE SERPL-MCNC: 89 MG/DL (ref 74–99)
HCT VFR BLD AUTO: 37.8 % (ref 41–52)
HGB BLD-MCNC: 13.4 G/DL (ref 13.5–17.5)
IMM GRANULOCYTES # BLD AUTO: 0.02 X10*3/UL (ref 0–0.7)
IMM GRANULOCYTES NFR BLD AUTO: 0.3 % (ref 0–0.9)
LYMPHOCYTES # BLD AUTO: 2.7 X10*3/UL (ref 1.2–4.8)
LYMPHOCYTES NFR BLD AUTO: 44.6 %
MCH RBC QN AUTO: 27.6 PG (ref 26–34)
MCHC RBC AUTO-ENTMCNC: 35.4 G/DL (ref 32–36)
MCV RBC AUTO: 78 FL (ref 80–100)
METHADONE UR QL SCN: ABNORMAL
MONOCYTES # BLD AUTO: 0.56 X10*3/UL (ref 0.1–1)
MONOCYTES NFR BLD AUTO: 9.3 %
NEUTROPHILS # BLD AUTO: 2.52 X10*3/UL (ref 1.2–7.7)
NEUTROPHILS NFR BLD AUTO: 41.7 %
NRBC BLD-RTO: 0 /100 WBCS (ref 0–0)
OPIATES UR QL SCN: ABNORMAL
OXYCODONE+OXYMORPHONE UR QL SCN: ABNORMAL
P AXIS: 14 DEGREES
P OFFSET: 199 MS
P ONSET: 137 MS
PCP UR QL SCN: ABNORMAL
PLATELET # BLD AUTO: 480 X10*3/UL (ref 150–450)
POTASSIUM SERPL-SCNC: 3.7 MMOL/L (ref 3.5–5.3)
PR INTERVAL: 166 MS
PROT SERPL-MCNC: 8.1 G/DL (ref 6.4–8.2)
Q ONSET: 220 MS
QRS COUNT: 11 BEATS
QRS DURATION: 80 MS
QT INTERVAL: 402 MS
QTC CALCULATION(BAZETT): 430 MS
QTC FREDERICIA: 421 MS
R AXIS: 61 DEGREES
RBC # BLD AUTO: 4.85 X10*6/UL (ref 4.5–5.9)
SALICYLATES SERPL-MCNC: <3 MG/DL (ref ?–20)
SODIUM SERPL-SCNC: 138 MMOL/L (ref 136–145)
T AXIS: 45 DEGREES
T OFFSET: 421 MS
VENTRICULAR RATE: 69 BPM
WBC # BLD AUTO: 6.1 X10*3/UL (ref 4.4–11.3)

## 2025-04-21 PROCEDURE — 80320 DRUG SCREEN QUANTALCOHOLS: CPT

## 2025-04-21 PROCEDURE — 80053 COMPREHEN METABOLIC PANEL: CPT

## 2025-04-21 PROCEDURE — 85025 COMPLETE CBC W/AUTO DIFF WBC: CPT

## 2025-04-21 PROCEDURE — 80307 DRUG TEST PRSMV CHEM ANLYZR: CPT

## 2025-04-21 PROCEDURE — 2500000002 HC RX 250 W HCPCS SELF ADMINISTERED DRUGS (ALT 637 FOR MEDICARE OP, ALT 636 FOR OP/ED)

## 2025-04-21 PROCEDURE — 82550 ASSAY OF CK (CPK): CPT

## 2025-04-21 PROCEDURE — 99285 EMERGENCY DEPT VISIT HI MDM: CPT | Performed by: STUDENT IN AN ORGANIZED HEALTH CARE EDUCATION/TRAINING PROGRAM

## 2025-04-21 PROCEDURE — 36415 COLL VENOUS BLD VENIPUNCTURE: CPT

## 2025-04-21 PROCEDURE — 93005 ELECTROCARDIOGRAM TRACING: CPT

## 2025-04-21 RX ORDER — OLANZAPINE 5 MG/1
5 TABLET, FILM COATED ORAL ONCE
Status: COMPLETED | OUTPATIENT
Start: 2025-04-21 | End: 2025-04-21

## 2025-04-21 RX ADMIN — OLANZAPINE 5 MG: 5 TABLET, FILM COATED ORAL at 06:56

## 2025-04-21 SDOH — HEALTH STABILITY: MENTAL HEALTH: WISH TO BE DEAD (PAST 1 MONTH): YES

## 2025-04-21 SDOH — HEALTH STABILITY: MENTAL HEALTH: NON-SPECIFIC ACTIVE SUICIDAL THOUGHTS (PAST 1 MONTH): YES

## 2025-04-21 SDOH — HEALTH STABILITY: MENTAL HEALTH: HAVE YOU ACTUALLY HAD ANY THOUGHTS OF KILLING YOURSELF?: YES

## 2025-04-21 SDOH — HEALTH STABILITY: MENTAL HEALTH: ACTIVE SUICIDAL IDEATION WITH SOME INTENT TO ACT, WITHOUT SPECIFIC PLAN (PAST 1 MONTH): YES

## 2025-04-21 SDOH — HEALTH STABILITY: MENTAL HEALTH: BEHAVIORS/MOOD: CALM;COOPERATIVE;WITHDRAWN

## 2025-04-21 SDOH — SOCIAL STABILITY: SOCIAL NETWORK: EMOTIONAL SUPPORT GIVEN: REASSURE

## 2025-04-21 SDOH — HEALTH STABILITY: MENTAL HEALTH: DEPRESSION SYMPTOMS: FEELINGS OF HELPLESSNESS

## 2025-04-21 SDOH — HEALTH STABILITY: MENTAL HEALTH: HALLUCINATION: AUDITORY

## 2025-04-21 SDOH — HEALTH STABILITY: MENTAL HEALTH: ACTIVE SUICIDAL IDEATION WITH SPECIFIC PLAN AND INTENT (PAST 1 MONTH): NO

## 2025-04-21 SDOH — HEALTH STABILITY: MENTAL HEALTH: SUICIDAL BEHAVIOR (LIFETIME): YES

## 2025-04-21 SDOH — HEALTH STABILITY: MENTAL HEALTH: WAS THIS WITHIN THE PAST THREE MONTHS?: YES

## 2025-04-21 SDOH — HEALTH STABILITY: MENTAL HEALTH: SUICIDAL BEHAVIOR (3 MONTHS): NO

## 2025-04-21 SDOH — HEALTH STABILITY: MENTAL HEALTH: HAVE YOU HAD THESE THOUGHTS AND HAD SOME INTENTION OF ACTING ON THEM?: YES

## 2025-04-21 SDOH — HEALTH STABILITY: MENTAL HEALTH: BEHAVIORAL HEALTH(WDL): EXCEPTIONS TO WDL

## 2025-04-21 SDOH — HEALTH STABILITY: MENTAL HEALTH: SUICIDE ASSESSMENT: ADULT (C-SSRS)

## 2025-04-21 SDOH — HEALTH STABILITY: MENTAL HEALTH: ANXIETY SYMPTOMS: NO PROBLEMS REPORTED OR OBSERVED.

## 2025-04-21 SDOH — HEALTH STABILITY: MENTAL HEALTH: HAVE YOU WISHED YOU WERE DEAD OR WISHED YOU COULD GO TO SLEEP AND NOT WAKE UP?: YES

## 2025-04-21 SDOH — ECONOMIC STABILITY: HOUSING INSECURITY

## 2025-04-21 SDOH — HEALTH STABILITY: MENTAL HEALTH: HAVE YOU BEEN THINKING ABOUT HOW YOU MIGHT DO THIS?: YES

## 2025-04-21 SDOH — HEALTH STABILITY: MENTAL HEALTH: NEEDS EXPRESSED: EMOTIONAL

## 2025-04-21 SDOH — HEALTH STABILITY: MENTAL HEALTH: HAVE YOU EVER DONE ANYTHING, STARTED TO DO ANYTHING, OR PREPARED TO DO ANYTHING TO END YOUR LIFE?: YES

## 2025-04-21 ASSESSMENT — PAIN - FUNCTIONAL ASSESSMENT: PAIN_FUNCTIONAL_ASSESSMENT: 0-10

## 2025-04-21 ASSESSMENT — LIFESTYLE VARIABLES
TOTAL SCORE: 0
SUBSTANCE_ABUSE_PAST_12_MONTHS: YES
HAVE YOU EVER FELT YOU SHOULD CUT DOWN ON YOUR DRINKING: NO
PRESCIPTION_ABUSE_PAST_12_MONTHS: NO
EVER HAD A DRINK FIRST THING IN THE MORNING TO STEADY YOUR NERVES TO GET RID OF A HANGOVER: NO
HAVE PEOPLE ANNOYED YOU BY CRITICIZING YOUR DRINKING: NO
EVER FELT BAD OR GUILTY ABOUT YOUR DRINKING: NO

## 2025-04-21 ASSESSMENT — COLUMBIA-SUICIDE SEVERITY RATING SCALE - C-SSRS
6. HAVE YOU EVER DONE ANYTHING, STARTED TO DO ANYTHING, OR PREPARED TO DO ANYTHING TO END YOUR LIFE?: YES
1. IN THE PAST MONTH, HAVE YOU WISHED YOU WERE DEAD OR WISHED YOU COULD GO TO SLEEP AND NOT WAKE UP?: YES
5. HAVE YOU STARTED TO WORK OUT OR WORKED OUT THE DETAILS OF HOW TO KILL YOURSELF? DO YOU INTEND TO CARRY OUT THIS PLAN?: YES
2. HAVE YOU ACTUALLY HAD ANY THOUGHTS OF KILLING YOURSELF?: YES
4. HAVE YOU HAD THESE THOUGHTS AND HAD SOME INTENTION OF ACTING ON THEM?: YES
6. HAVE YOU EVER DONE ANYTHING, STARTED TO DO ANYTHING, OR PREPARED TO DO ANYTHING TO END YOUR LIFE?: YES

## 2025-04-21 ASSESSMENT — PAIN SCALES - GENERAL: PAINLEVEL_OUTOF10: 7

## 2025-04-21 NOTE — PROGRESS NOTES
Patient is a 40-year-old with history of schizophrenia who presented with suicidal and homicidal ideation. Labwork showed no leukocytosis. Hemoglobin is 13.4. CMP is unremarkable. Acute tox panel was negative. Urine drug screen was positive for amphetamine, cannabinoid, cocaine, and PCP. Patient creatinine kinase was slightly elevated at 434 for which patient was given oral hydration. At this time the patient is stable and medically cleared for EPAT evaluation.

## 2025-04-21 NOTE — ED TRIAGE NOTES
I need you to change the way the solumedrol 40 mg  was ordered for this pt. I cant chart it because its ordered intravenously. Thank you!!   Pt to the ED for a psych eval. PT endorsing AH that are telling him to lay down in front of a train. Si and HI.

## 2025-04-21 NOTE — ED PROVIDER NOTES
EMERGENCY DEPARTMENT ENCOUNTER      Pt Name: Nathan Vaughn  MRN: [id number]  Birthdate 1985  Date of evaluation: 4/21/2025  Provider: Placido Arroyo DO    CHIEF COMPLAINT       Chief Complaint   Patient presents with    Psychiatric Evaluation         HISTORY OF PRESENT ILLNESS    40-year-old male chemistry schizophrenia, nicotine dependence, obesity, comes emergency with suicidal and homicidal ideation started about 5 hours ago.  He said he has been off of his Haldol and Ativan over the last couple weeks.  He says his suicidal ideation is the fact that he is hearing voices telling him to lay in front of train tracks to kill himself.  His homicidal ideations to hurt one of his friends.  No medical complaints today, no chest pain, shortness breath, abdominal pain.  History of a previous attempt by slitting his wrists.      History provided by:  Patient      Nursing Notes were reviewed.    PAST MEDICAL HISTORY   Medical History[1]      SURGICAL HISTORY     Surgical History[2]      CURRENT MEDICATIONS       Previous Medications    ACETAMINOPHEN (TYLENOL) 325 MG TABLET    Take 2 tablets (650 mg) by mouth every 4 hours if needed for moderate pain (4 - 6).    FLUOXETINE (PROZAC) 20 MG CAPSULE    Take 1 capsule (20 mg) by mouth once daily.    HALOPERIDOL (HALDOL) 5 MG TABLET    Take 1 tablet (5 mg) by mouth every 6 hours if needed for agitation.    HYDROXYZINE PAMOATE (VISTARIL) 50 MG CAPSULE    Take 1 capsule (50 mg) by mouth every 6 hours if needed for anxiety (1st line).    MELATONIN 3 MG TABLET    Take 1 tablet (3 mg) by mouth once daily.    MULTIVITAMIN WITH MINERALS TABLET    Take 1 tablet by mouth once daily.    OLANZAPINE (ZYPREXA) 15 MG TABLET    Take 1 tablet (15 mg) by mouth once daily at bedtime.       ALLERGIES     Patient has no known allergies.    FAMILY HISTORY     Family History[3]       SOCIAL HISTORY     Social History[4]    SCREENINGS                        PHYSICAL EXAM    (up to 7 for level 4, 8 or  more for level 5)     ED Triage Vitals [04/21/25 0457]   Temperature Heart Rate Respirations BP   37.1 °C (98.7 °F) 76 18 113/79      Pulse Ox Temp Source Heart Rate Source Patient Position   99 % Tympanic -- --      BP Location FiO2 (%)     -- --       Physical Exam  Vitals and nursing note reviewed.   Constitutional:       Appearance: Normal appearance.   HENT:      Head: Normocephalic and atraumatic.   Eyes:      General: No scleral icterus.        Right eye: No discharge.         Left eye: No discharge.      Conjunctiva/sclera: Conjunctivae normal.   Cardiovascular:      Rate and Rhythm: Normal rate and regular rhythm.   Pulmonary:      Effort: Pulmonary effort is normal. No respiratory distress.      Breath sounds: No wheezing, rhonchi or rales.   Abdominal:      General: There is no distension.   Musculoskeletal:      Cervical back: Normal range of motion.   Skin:     General: Skin is warm and dry.   Neurological:      Mental Status: He is alert. Mental status is at baseline.   Psychiatric:         Mood and Affect: Mood normal. Mood is not anxious.         Speech: He is communicative. Speech is not rapid and pressured.         Behavior: Behavior normal.         Thought Content: Thought content includes homicidal and suicidal ideation.          DIAGNOSTIC RESULTS     LABS:  Labs Reviewed   CBC WITH AUTO DIFFERENTIAL   COMPREHENSIVE METABOLIC PANEL   DRUG SCREEN,URINE   ACUTE TOXICOLOGY PANEL, BLOOD       All other labs were within normal range or not returned as of this dictation.    Imaging  No orders to display        Procedures  Procedures     EMERGENCY DEPARTMENT COURSE/MDM:     ED Course as of 04/21/25 0559   Mon Apr 21, 2025   0557 Independently interpreted EKG shows sinus rhythm 69 bpm, QTc 430, no acute injury pattern. [RD]      ED Course User Index  [RD] Placido Arroyo DO         Diagnoses as of 04/21/25 0559   Suicidal ideation   Homicidal ideation   Auditory hallucinations        Medical Decision  Making  40-year-old male comes emergency for suicidal, homicidal ideation, auditory hallucinations, psychiatric workup is initiated, EKG shows normal QT interval, patient is calm and cooperative here today, checked himself and, has been off of his medications for a couple weeks.  Patient was signed out to morning provider pending EPAT evaluation, medical clearance, likely placement.      Patient and or family in agreement and understanding of treatment plan.  All questions answered.      I reviewed the case with the attending ED physician. The attending ED physician agrees with the plan. Patient and/or patient´s representative was counseled regarding labs, imaging, likely diagnosis, and plan. All questions were answered.    Final Impression:   1. Suicidal ideation    2. Homicidal ideation    3. Auditory hallucinations          (Please note that portions of this note were completed with a voice recognition program.  Efforts were made to edit the dictations but occasionally words are mis-transcribed.)       [1]   Past Medical History:  Diagnosis Date    Aggressive behavior 07/26/2019    Cannabis use disorder 03/15/2023    Cellulitis 11/01/2021    Formatting of this note might be different from the original. Last Assessment & Plan: Formatting of this note might be different from the original. Assessment: Secondary to dog bite PLAN: - See plan for dog bite Last Assessment & Plan: Formatting of this note might be different from the original. Assessment: Secondary to dog bite PLAN: - See plan for dog bite    Electrolyte disorder 07/23/2019    Homicidal thoughts 11/04/2023    Methamphetamine intoxication (Multi) 07/24/2019    Polysubstance dependence, non-opioid, in remission (Multi) 04/15/2022    Formatting of this note might be different from the original. Dx 2014 with amphetamine, cannabis, alcohol, and hallucinogen mixed abuse/dependence Last Assessment & Plan: Formatting of this note might be different from the  original. A: active severe problem, with unclear dependency/severity. 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 Last Assessment & Plan: Formatting of this note might be different from the original. Assessment: - Chronically elevated platelet count >400k since 9/30/2021 - currently not on any medications PLAN: - start aspirin 81 mg daily Last Assessment & Plan: Formatting of this note might be different from the original. Assessment: - Chronically   [2] No past surgical history on file.  [3] No family history on file.  [4]   Social History  Socioeconomic History    Marital status: Single   Tobacco Use    Smoking status: Every Day     Current packs/day: 1.00     Average packs/day: 1 pack/day for 20.0 years (20.0 ttl pk-yrs)     Types: Cigarettes    Smokeless tobacco: Never   Substance and Sexual Activity    Alcohol use: Yes    Drug use: Yes     Types: Methamphetamines, Cocaine, Marijuana     Social Drivers of Health     Financial Resource Strain: Medium Risk (4/8/2025)    Received from [facility]    Overall Financial Resource Strain (CARDIA)     Difficulty of Paying Living Expenses: Somewhat hard   Food Insecurity: No Food Insecurity (4/8/2025)    Received from [facility]    Hunger Vital Sign     Worried About Running Out of Food in the Last Year: Never true     Ran Out of Food in the Last Year: Never true   Recent Concern: Food Insecurity - Food Insecurity Present (2/27/2025)    Received from [facility]    Hunger Vital Sign     Worried About Running Out of Food in the Last Year: Sometimes true     Ran Out of Food in the Last Year: Sometimes true   Transportation Needs: Unmet Transportation Needs (4/8/2025)    Received  from [facility]    PRAPARE - Transportation     Lack of Transportation (Medical): Yes     Lack of Transportation (Non-Medical): Yes   Physical Activity: Inactive (3/7/2025)    Exercise Vital Sign     Days of Exercise per Week: 0 days     Minutes of Exercise per Session: 0 min   Stress: Patient Declined (3/7/2025)    Citizen of the [place] [employer] [employer] - Occupational Stress Questionnaire     Feeling of Stress : Patient declined   Social Connections: Patient Declined (3/7/2025)    Social Connection and Isolation Panel [NHANES]     Frequency of Communication with Friends and Family: Patient declined     Frequency of Social Gatherings with Friends and Family: Patient declined     Attends [employer] Services: Patient declined     Active Member of Clubs or Organizations: Patient declined     Attends Club or Organization Meetings: Patient declined     Marital Status: Patient declined   Intimate Partner Violence: Patient Declined (3/7/2025)    Humiliation, Afraid, Rape, and Kick questionnaire     Fear of Current or Ex-Partner: Patient declined     Emotionally Abused: Patient declined     Physically Abused: Patient declined     Sexually Abused: Patient declined   Housing Stability: High Risk (4/8/2025)    Received from [facility]    Housing Stability Vital Sign     Unable to Pay for Housing in the Last Year: Yes     Homeless in the Last Year: Yes        Placido Arroyo DO  Resident  04/21/25 0559

## 2025-04-21 NOTE — SIGNIFICANT EVENT
Application for Emergency Admission      Ready for Transfer?  Is the patient medically cleared for transfer to inpatient psychiatry: Yes  Has the patient been accepted to an inpatient psychiatric hospital: Yes    Application for Emergency Admission  IN ACCORDANCE [place]  The Chief Clinical Officer of: [facility] [employer] 4/21/2025 .12:37 PM    Reason for Hospitalization  The undersigned has reason to believe that: Nathan Vaughn Is a mentally ill person subject to hospitalization by court order under division B Section 5122.01 of the Revised Code, i.e., this person:    1.Yes  Represents a substantial risk of physical harm to self as manifested by evidence of threats of, or attempts at, suicide or serious self-inflicted bodily harm    2.Yes Represents a substantial risk of physical harm to others as manifested by evidence of recent homicidal or other violent behavior, evidence of recent threats that place another in reasonable fear of violent behavior and serious physical harm, or other evidence of present dangerousness    3.Yes Represents a substantial and immediate risk of serious physical impairment or injury to self as manifested by  evidence that the person is unable to provide for and is not providing for the person's basic physical needs because of the person's mental illness and that appropriate provision for those needs cannot be made  immediately available in the community    4.Yes Would benefit from treatment in a hospital for his mental illness and is in need of such treatment as manifested by evidence of behavior that creates a grave and imminent risk to substantial rights of others or  himself.    5.Yes Would benefit from treatment as manifested by evidence of behavior that indicates all of the following:       (a) The person is unlikely to survive safely in the community without supervision, based on a clinical determination.       (b) The person has a history of lack of compliance with  treatment for mental illness and one of the following applies:      (i) At least twice within the thirty-six months prior to the filing of an affidavit seeking court-ordered treatment of the person under section 5122.111 of the Revised Code, the lack of compliance has been a significant factor in necessitating hospitalization in a hospital or receipt of services in a forensic or other mental health unit of a correctional facility, provided that the thirty-six-month period shall be extended by the length of any hospitalization or incarceration of the person that occurred within the thirty-six-month period.      (ii) Within the forty-eight months prior to the filing of an affidavit seeking court-ordered treatment of the person under section 5122.111 of the Revised Code, the lack of compliance resulted in one or more acts of serious violent behavior toward self or others or threats of, or attempts at, serious physical harm to self or others, provided that the forty-eight-month period shall be extended by the length of any hospitalization or incarceration of the person that occurred within the forty-eight-month period.      (c) The person, as a result of mental illness, is unlikely to voluntarily participate in necessary treatment.       (d) In view of the person's treatment history and current behavior, the person is in need of treatment in order to prevent a relapse or deterioration that would be likely to result in substantial risk of serious harm to the person or others.    (e) Represents a substantial risk of physical harm to self or others if allowed to remain at liberty pending examination.    Therefore, it is requested that said person be admitted to the above named facility.    STATEMENT OF BELIEF    Must be filled out by one of the following: a psychiatrist, licensed physician, licensed clinical psychologist, health or ,  or .  (Statement shall include the circumstances under  which the individual was taken into custody and the reason for the person's belief that hospitalization is necessary. The statement shall also include a reference to efforts made to secure the individual's property at his residence if he was taken into custody there. Every reasonable and appropriate effort should be made to take this person into custody in the least conspicuous manner possible.)    Patient presented with flights of ideas with suicidal and homicidal ideation.  Patient is noncompliant with medication.  Patient cannot take care of himself.  Patient need higher acuity care    Neo Abdi MD 4/21/2025     _____________________________________________________________   Place of Employment: [facility]    STATEMENT OF OBSERVATION BY PSYCHIATRIST, LICENSED PHYSICIAN, OR LICENSED CLINICAL PSYCHOLOGIST, IF APPLICABLE    Place of Observation (e.g., [facility] mental health center, general hospital, office, emergency facility)  (If applicable, please complete)    Neo Abdi MD 4/21/2025    _____________________________________________________________

## 2025-04-21 NOTE — PROGRESS NOTES
EPAT - Social Work Psychiatric Assessment    Arrival Details  Mode of Arrival: Ambulatory  Admission Source: Home  Admission Type: Voluntary  EPAT Assessment Start Date: 04/21/25  EPAT Assessment Start Time: 0730  Name of : SHYAM Lombardi LSW    History of Present Illness  Admission Reason: Psychiatric Evaluation  HPI:     Patient is a 41yo male presenting to the ED by self through triage with chief complaint of psychiatric evaluation. Reportedly, “pt endorsing AH that are telling him to lay down in front of a train. SI and HI”. Patient's chart, triage, and provider note reviewed prior to assessment. Patient has a psychiatric history of Antisocial Personality D/O, PSUD, and likely Substance-Induced Psychotic/Mood Disorder. Patient reports a remote history of cutting his wrist approximately 9 years ago, indicated high risk at triage. He is indicated high risk at triage, BAL negative, UTOX positive for Amphetamines, Cannabis, cocaine, and PCP.     Patient is well-known to this writer/service/ED and surrounding emergency services due to frequent utilization. The patient has a significant history of inpatient admissions - he is typically medically admitted for rhabdo in the setting of chronic stimulant abuse and then transferred to a psychiatric unit once CK is managed. The patient demonstrates chronic non-compliance with outpatient treatment due to being in the hospital more frequently than being in the community. Patient consistently presents to the ED with reports of SI/HI/paranoia and sometimes CAH, frequently immediately after discharge from another psychiatric facility, and demonstrates no change in behaviors after these admissions. The patient is known to be vague with report of SI or HI and will often grandstand/escalate himself when asked to clarify reports, likely in an attempt to intimidate the .     Patient's recent hospitalization history includes:   [facility] medical admission 2/24-2/26 with  subsequent psych admission until 3/3/25  [facility] medical admission 3/6-3/10 with [facility]  [facility] medical admission 4/6-4/7 with subsequent psych admission until 4/11/25     SW Readmission Information   Readmission within 30 Days: Yes  Previous ED Visit Date and Reason : see HPI  Previous Discharge Date and Location: see HPI    Psychiatric Symptoms  Anxiety Symptoms: No problems reported or observed.  Depression Symptoms: Feelings of helplessness  Gely Symptoms: No problems reported or observed.    Psychosis Symptoms  Hallucination Type: Auditory, Command  Delusion Type: No problems reported or observed.    Additional Symptoms - Adult  Generalized Anxiety Disorder: No problems reported or observed.  Obsessive Compulsive Disorder: No problems reported or observed.  Panic Attack: No problems reported or observed.  Post Traumatic Stress Disorder: No problems reported or observed.  Delirium: No problems reported or observed.    Past Psychiatric History/Meds/Treatments  Past Psychiatric History: Psychiatric Diagnosis: Antisocial Personality D/O, PSUD (Cocaine, Cannabis, Methamphetamine), and likely Substance-Induced Psychotic/Mood Disorder // Current  Center: none // Previous Admissions: many, most recent [facility] 4/6-4/11/25 and [facility] 3/6-3/19/25  Past Psychiatric Meds/Treatments: Pt most recently discharged on Zyprexa 10mg  Past Violence/Victimization History: None reported during current visit; chart flagged risk for violence due to Antisocial Personality Traits    Current Mental Health Contacts   Name/Phone Number: none   Last Appointment Date: none  Provider Name/Phone Number: none  Provider Last Appointment Date: none    Support System: Community    Living Arrangement: Homeless    Home Safety  Feels Safe Living in Home:  (n/a)    Income Information  Employment Status for: Patient  Employment Status: Disabled  Income Source:  Disability    [employer] Service/Education History  Current or Previous  Service: None  Education Level:  (did not assess)    Social/Cultural History  Social History: [place] Citizen: yes // Payee; None // Guardian/POA: self  Cultural Requests During Hospitalization: none  Spiritual Requests During Hospitalization: none  Important Activities:  (none reported)    Legal  Criminal Activity/ Legal Involvement Pertinent to Current Situation/ Hospitalization: None reported    Drug Screening  Have you used any substances (canabis, cocaine, heroin, hallucinogens, inhalants, etc.) in the past 12 months?: Yes  Have you used any prescription drugs other than prescribed in the past 12 months?: No  Is a toxicology screen needed?: Yes    Stage of Change  Stage of Change: Precontemplation  History of Treatment: Inpatient, Dual, Sober living  Type of Treatment Offered: Inpatient  Treatment Offered:  (n/a)  Duration of Substance Use: unkn  Frequency of Substance Use: daily  Age of First Substance Use: unkn    Behavioral Health  Behavioral Health(WDL): Within Defined Limits  Behaviors/Mood: Calm, Cooperative, Manipulative  Affect: Appropriate to circumstances    Orientation  Orientation Level: Oriented X4    General Appearance  Motor Activity: Unremarkable  Speech Pattern: Slow  General Attitude: Cooperative  Appearance/Hygiene: Disheveled    Thought Process  Coherency: [place] thinking  Content: Unremarkable  Delusions:  (None)  Perception: Not altered  Hallucination: Auditory, Command  Judgment/Insight:  (Limited at baseline 2/2 continued substance abuse)  Confusion: None  Cognition: Appropriate safety awareness, Appropriate attention/concentration    Sleep Pattern  Sleep Pattern: Restlessness    Risk Factors  Self Harm/Suicidal Ideation Plan: SI with plan to lay on train tracks  Previous Self Harm/Suicidal Plans: hx of cutting his wrist approx 9 years ago  Risk Factors: Male, Major mental illness, Substance abuse    Violence Risk  "Assessment  Assessment of Violence: None noted  Thoughts of Harm to Others: Yes - currently present  Description of Violence Behavior: None noted  Homicidal ideation: Yes - currently present  Current Homicidal Intent: No  Current Homicidal Plan: No  Access to Homicidal Means: No  Identified Victim: \"some people I thought were my friends\"  History of Harm to Others:  (None known or reported)  Access to Weapons: No    Ability to Assess Risk Screen  Risk Screen - Ability to Assess: Able to be screened  [place] Suicide Severity Rating Scale (Screener/Recent Self-Report)  1. Wish to be Dead (Past 1 Month): Yes  2. Non-Specific Active Suicidal Thoughts (Past 1 Month): Yes  3. Active Suicidal Ideation with any Methods (Not Plan) Without Intent to Act (Past 1 Month): Yes  4. Active Suicidal Ideation with Some Intent to Act, Without Specific Plan (Past 1 Month): Yes  5. Active Suicidal Ideation with Specific Plan and Intent (Past 1 Month): No  6. Suicidal Behavior (Lifetime): Yes  6. Suicidal Behavior (3 Months): No  Calculated C-SSRS Risk Score (Lifetime/Recent): High Risk  Step 1: Risk Factors  Current & Past Psychiatric Dx: Alcohol/substance abuse disorders, Cluster B personality disorders or traits (i.e., borderline, antisocial, histrionic & narcissistic)  Presenting Symptoms: Anhedonia  Precipitants/Stressors: Substance intoxication or withdrawal, Pending incarceration or homelessness  Change in Treatment: Non-compliant or not receiving treatment, Recent inpatient discharge  Access to Lethal Methods : No  Step 2: Protective Factors   Protective Factors Internal: Fear of death or the actual act of killing self, Identifies reasons for living  Protective Factors External: Cultural, spiritual and/or moral attitudes against suicide  Step 3: Suicidal Ideation Intensity  How Many Times Have You Had These Thoughts: 2-5 times in a week  When You Have the Thoughts How Long do They Last : Less than 1 hour/some of the " time  Could/Can You Stop Thinking About Killing Yourself or Wanting to Die if You Want to: Can control thoughts with little difficulty  Are There Things - Anyone or Anything - That Stopped You From Wanting to Die or Acting on: Deterrents definitely stopped you from attempting suicide  What Sort of Reasons Did You Have For Thinking About Wanting to Die or Killing Yourself: Mostly to get attention, revenge, or a reaction from others  Total Score: 10  Step 5: Documentation  Risk Level: Moderate suicide risk (Patient indicated moderate acute risk to self in setting of plan for inpt admission due to lack of access to lethal means on unit. Discussed with EUGENE Abdi)    Psychiatric Impression and Plan of Care  Assessment and Plan:    Upon assessment the patient was somnolent, calm, and superficially cooperative. Patient continued to endorse SI with CAH to lay on the train tracks. He further endorsed vague HI toward “some people I thought were my friends”, denied specific plan/intent or access to lethal means. Patient denied VH, no delusions were elicited, and patient did not appear to be responding to internal stimuli. The patient was unable to provide insight into recent stressors, triggers, or reasons for decompensation since his recent inpt discharge less than 2 weeks ago. Patient identified “substance abuse” as his reason for missing his follow-up outpatient appointment on 4/17 with [facility] [place]. The patient remained future/goal-oriented and discussed need to engage in rehab after “getting my mental right on meds”. Again, patient was unable to provide reasoning for lack of medication compliance since his discharge.     Diagnostic Impression:       Polysubstance Use Disorder    Unspecified Mood Disorder r/o Stimulant Induced Mood Disorder     Psychiatric Impression and Plan for Care: Patient presents as an elevated risk to self and others, admission discussed with Neo Abdi MD.     Specific Resources Provided  "to Patient: Admission    Outcome/Disposition  Patient's Perception of Outcome Achieved: \"I just need my mental stable\"  Assessment, Recommendations and Risk Level Reviewed with: Dr. Abdi  Contact Name: none provided  Contact Number(s): -  Contact Relationship: -  EPAT Assessment Completed Date: 04/21/25  EPAT Assessment Completed Time: 0848      "

## 2025-06-07 ENCOUNTER — HOSPITAL ENCOUNTER (OUTPATIENT)
Facility: HOSPITAL | Age: 40
Setting detail: OBSERVATION
Discharge: HOME | End: 2025-06-09
Attending: STUDENT IN AN ORGANIZED HEALTH CARE EDUCATION/TRAINING PROGRAM | Admitting: EMERGENCY MEDICINE
Payer: MEDICARE

## 2025-06-07 DIAGNOSIS — R44.0 AUDITORY HALLUCINATION: ICD-10-CM

## 2025-06-07 DIAGNOSIS — R45.850 HOMICIDAL IDEATION: ICD-10-CM

## 2025-06-07 DIAGNOSIS — R45.851 SUICIDAL IDEATION: Primary | ICD-10-CM

## 2025-06-07 LAB
ALBUMIN SERPL BCP-MCNC: 4.7 G/DL (ref 3.4–5)
ALP SERPL-CCNC: 78 U/L (ref 33–120)
ALT SERPL W P-5'-P-CCNC: 79 U/L (ref 10–52)
ANION GAP SERPL CALC-SCNC: 15 MMOL/L (ref 10–20)
APAP SERPL-MCNC: <10 UG/ML (ref ?–30)
AST SERPL W P-5'-P-CCNC: 94 U/L (ref 9–39)
BASOPHILS # BLD AUTO: 0.03 X10*3/UL (ref 0–0.1)
BASOPHILS NFR BLD AUTO: 0.5 %
BILIRUB SERPL-MCNC: 1.1 MG/DL (ref 0–1.2)
BUN SERPL-MCNC: 20 MG/DL (ref 6–23)
CALCIUM SERPL-MCNC: 9.7 MG/DL (ref 8.6–10.6)
CHLORIDE SERPL-SCNC: 101 MMOL/L (ref 98–107)
CK SERPL-CCNC: 3615 U/L (ref 0–325)
CO2 SERPL-SCNC: 27 MMOL/L (ref 21–32)
CREAT SERPL-MCNC: 1.03 MG/DL (ref 0.5–1.3)
EGFRCR SERPLBLD CKD-EPI 2021: >90 ML/MIN/1.73M*2
EOSINOPHIL # BLD AUTO: 0.23 X10*3/UL (ref 0–0.7)
EOSINOPHIL NFR BLD AUTO: 4.1 %
ERYTHROCYTE [DISTWIDTH] IN BLOOD BY AUTOMATED COUNT: 12.9 % (ref 11.5–14.5)
ETHANOL SERPL-MCNC: <10 MG/DL
GLUCOSE SERPL-MCNC: 93 MG/DL (ref 74–99)
HCT VFR BLD AUTO: 40.4 % (ref 41–52)
HGB BLD-MCNC: 13.6 G/DL (ref 13.5–17.5)
IMM GRANULOCYTES # BLD AUTO: 0.01 X10*3/UL (ref 0–0.7)
IMM GRANULOCYTES NFR BLD AUTO: 0.2 % (ref 0–0.9)
LYMPHOCYTES # BLD AUTO: 2.2 X10*3/UL (ref 1.2–4.8)
LYMPHOCYTES NFR BLD AUTO: 38.9 %
MCH RBC QN AUTO: 27.3 PG (ref 26–34)
MCHC RBC AUTO-ENTMCNC: 33.7 G/DL (ref 32–36)
MCV RBC AUTO: 81 FL (ref 80–100)
MONOCYTES # BLD AUTO: 0.81 X10*3/UL (ref 0.1–1)
MONOCYTES NFR BLD AUTO: 14.3 %
NEUTROPHILS # BLD AUTO: 2.37 X10*3/UL (ref 1.2–7.7)
NEUTROPHILS NFR BLD AUTO: 42 %
NRBC BLD-RTO: 0 /100 WBCS (ref 0–0)
PLATELET # BLD AUTO: 382 X10*3/UL (ref 150–450)
POTASSIUM SERPL-SCNC: 3.5 MMOL/L (ref 3.5–5.3)
PROT SERPL-MCNC: 8.6 G/DL (ref 6.4–8.2)
RBC # BLD AUTO: 4.99 X10*6/UL (ref 4.5–5.9)
SALICYLATES SERPL-MCNC: <3 MG/DL (ref ?–20)
SODIUM SERPL-SCNC: 139 MMOL/L (ref 136–145)
WBC # BLD AUTO: 5.7 X10*3/UL (ref 4.4–11.3)

## 2025-06-07 PROCEDURE — 82550 ASSAY OF CK (CPK): CPT | Performed by: STUDENT IN AN ORGANIZED HEALTH CARE EDUCATION/TRAINING PROGRAM

## 2025-06-07 PROCEDURE — 99285 EMERGENCY DEPT VISIT HI MDM: CPT | Performed by: STUDENT IN AN ORGANIZED HEALTH CARE EDUCATION/TRAINING PROGRAM

## 2025-06-07 PROCEDURE — 80143 DRUG ASSAY ACETAMINOPHEN: CPT | Performed by: EMERGENCY MEDICINE

## 2025-06-07 PROCEDURE — 85025 COMPLETE CBC W/AUTO DIFF WBC: CPT | Performed by: EMERGENCY MEDICINE

## 2025-06-07 PROCEDURE — 36415 COLL VENOUS BLD VENIPUNCTURE: CPT | Performed by: EMERGENCY MEDICINE

## 2025-06-07 PROCEDURE — 93010 ELECTROCARDIOGRAM REPORT: CPT | Performed by: EMERGENCY MEDICINE

## 2025-06-07 PROCEDURE — 80053 COMPREHEN METABOLIC PANEL: CPT | Performed by: EMERGENCY MEDICINE

## 2025-06-07 RX ORDER — ACETAMINOPHEN 325 MG/1
975 TABLET ORAL ONCE
Status: COMPLETED | OUTPATIENT
Start: 2025-06-07 | End: 2025-06-08

## 2025-06-07 SDOH — HEALTH STABILITY: MENTAL HEALTH: SUICIDAL BEHAVIOR (3 MONTHS): NO

## 2025-06-07 SDOH — HEALTH STABILITY: MENTAL HEALTH: ACTIVE SUICIDAL IDEATION WITH SPECIFIC PLAN AND INTENT (PAST 1 MONTH): YES

## 2025-06-07 SDOH — HEALTH STABILITY: MENTAL HEALTH: BEHAVIORS/MOOD: COOPERATIVE;APPROPRIATE FOR SITUATION;APPROPRIATE FOR AGE;AGITATED

## 2025-06-07 SDOH — HEALTH STABILITY: MENTAL HEALTH: WISH TO BE DEAD (PAST 1 MONTH): YES

## 2025-06-07 SDOH — HEALTH STABILITY: MENTAL HEALTH: SLEEP PATTERN: SLEEPS ALL NIGHT

## 2025-06-07 SDOH — HEALTH STABILITY: MENTAL HEALTH: HAVE YOU BEEN THINKING ABOUT HOW YOU MIGHT DO THIS?: YES

## 2025-06-07 SDOH — HEALTH STABILITY: MENTAL HEALTH: SUICIDAL BEHAVIOR (LIFETIME): YES

## 2025-06-07 SDOH — HEALTH STABILITY: MENTAL HEALTH: ACTIVE SUICIDAL IDEATION WITH SOME INTENT TO ACT, WITHOUT SPECIFIC PLAN (PAST 1 MONTH): YES

## 2025-06-07 SDOH — HEALTH STABILITY: MENTAL HEALTH: FOR HIGH RISK PATIENTS: ALL INTERVENTIONS ABOVE, PLUS:

## 2025-06-07 SDOH — HEALTH STABILITY: MENTAL HEALTH: HAVE YOU ACTUALLY HAD ANY THOUGHTS OF KILLING YOURSELF?: YES

## 2025-06-07 SDOH — HEALTH STABILITY: MENTAL HEALTH
DEPRESSION SYMPTOMS: FEELINGS OF HELPLESSNESS;FEELINGS OF HOPELESSESS;FEELINGS OF WORTHLESSNESS;APPETITE CHANGE;SLEEP DISTURBANCE

## 2025-06-07 SDOH — HEALTH STABILITY: MENTAL HEALTH: SUICIDE ASSESSMENT: ADULT (C-SSRS)

## 2025-06-07 SDOH — HEALTH STABILITY: MENTAL HEALTH: NON-SPECIFIC ACTIVE SUICIDAL THOUGHTS (PAST 1 MONTH): YES

## 2025-06-07 SDOH — HEALTH STABILITY: MENTAL HEALTH: HAVE YOU WISHED YOU WERE DEAD OR WISHED YOU COULD GO TO SLEEP AND NOT WAKE UP?: YES

## 2025-06-07 SDOH — HEALTH STABILITY: MENTAL HEALTH: BEHAVIORAL HEALTH(WDL): EXCEPTIONS TO WDL

## 2025-06-07 SDOH — HEALTH STABILITY: MENTAL HEALTH: CONTENT: UNREMARKABLE

## 2025-06-07 SDOH — HEALTH STABILITY: MENTAL HEALTH: ANXIETY SYMPTOMS: GENERALIZED

## 2025-06-07 SDOH — HEALTH STABILITY: MENTAL HEALTH: HAVE YOU EVER DONE ANYTHING, STARTED TO DO ANYTHING, OR PREPARED TO DO ANYTHING TO END YOUR LIFE?: NO

## 2025-06-07 SDOH — HEALTH STABILITY: MENTAL HEALTH: HAVE YOU HAD THESE THOUGHTS AND HAD SOME INTENTION OF ACTING ON THEM?: YES

## 2025-06-07 SDOH — HEALTH STABILITY: MENTAL HEALTH: HALLUCINATION: AUDITORY

## 2025-06-07 ASSESSMENT — LIFESTYLE VARIABLES
EVER FELT BAD OR GUILTY ABOUT YOUR DRINKING: NO
HAVE PEOPLE ANNOYED YOU BY CRITICIZING YOUR DRINKING: NO
HAVE YOU EVER FELT YOU SHOULD CUT DOWN ON YOUR DRINKING: NO
SUBSTANCE_ABUSE_PAST_12_MONTHS: YES
EVER HAD A DRINK FIRST THING IN THE MORNING TO STEADY YOUR NERVES TO GET RID OF A HANGOVER: NO
TOTAL SCORE: 0
PRESCIPTION_ABUSE_PAST_12_MONTHS: YES

## 2025-06-07 ASSESSMENT — COLUMBIA-SUICIDE SEVERITY RATING SCALE - C-SSRS
6. HAVE YOU EVER DONE ANYTHING, STARTED TO DO ANYTHING, OR PREPARED TO DO ANYTHING TO END YOUR LIFE?: YES
6. HAVE YOU EVER DONE ANYTHING, STARTED TO DO ANYTHING, OR PREPARED TO DO ANYTHING TO END YOUR LIFE?: YES
4. HAVE YOU HAD THESE THOUGHTS AND HAD SOME INTENTION OF ACTING ON THEM?: YES
2. HAVE YOU ACTUALLY HAD ANY THOUGHTS OF KILLING YOURSELF?: YES
5. HAVE YOU STARTED TO WORK OUT OR WORKED OUT THE DETAILS OF HOW TO KILL YOURSELF? DO YOU INTEND TO CARRY OUT THIS PLAN?: YES
1. IN THE PAST MONTH, HAVE YOU WISHED YOU WERE DEAD OR WISHED YOU COULD GO TO SLEEP AND NOT WAKE UP?: YES

## 2025-06-07 ASSESSMENT — PAIN DESCRIPTION - PROGRESSION: CLINICAL_PROGRESSION: NOT CHANGED

## 2025-06-07 ASSESSMENT — PAIN SCALES - GENERAL: PAINLEVEL_OUTOF10: 0 - NO PAIN

## 2025-06-07 ASSESSMENT — PAIN - FUNCTIONAL ASSESSMENT: PAIN_FUNCTIONAL_ASSESSMENT: 0-10

## 2025-06-08 ENCOUNTER — CLINICAL SUPPORT (OUTPATIENT)
Dept: EMERGENCY MEDICINE | Facility: HOSPITAL | Age: 40
End: 2025-06-08
Payer: MEDICARE

## 2025-06-08 LAB
AMPHETAMINES UR QL SCN: ABNORMAL
BARBITURATES UR QL SCN: ABNORMAL
BENZODIAZ UR QL SCN: ABNORMAL
BZE UR QL SCN: ABNORMAL
CANNABINOIDS UR QL SCN: ABNORMAL
CK SERPL-CCNC: 1368 U/L (ref 0–325)
CK SERPL-CCNC: 1554 U/L (ref 0–325)
CK SERPL-CCNC: 2528 U/L (ref 0–325)
FENTANYL+NORFENTANYL UR QL SCN: ABNORMAL
METHADONE UR QL SCN: ABNORMAL
OPIATES UR QL SCN: ABNORMAL
OXYCODONE+OXYMORPHONE UR QL SCN: ABNORMAL
PCP UR QL SCN: ABNORMAL

## 2025-06-08 PROCEDURE — 82550 ASSAY OF CK (CPK): CPT | Performed by: NURSE PRACTITIONER

## 2025-06-08 PROCEDURE — 96360 HYDRATION IV INFUSION INIT: CPT

## 2025-06-08 PROCEDURE — 80307 DRUG TEST PRSMV CHEM ANLYZR: CPT | Performed by: EMERGENCY MEDICINE

## 2025-06-08 PROCEDURE — 2500000004 HC RX 250 GENERAL PHARMACY W/ HCPCS (ALT 636 FOR OP/ED): Performed by: NURSE PRACTITIONER

## 2025-06-08 PROCEDURE — 82550 ASSAY OF CK (CPK): CPT | Performed by: EMERGENCY MEDICINE

## 2025-06-08 PROCEDURE — 2500000004 HC RX 250 GENERAL PHARMACY W/ HCPCS (ALT 636 FOR OP/ED)

## 2025-06-08 PROCEDURE — 36415 COLL VENOUS BLD VENIPUNCTURE: CPT

## 2025-06-08 PROCEDURE — 93005 ELECTROCARDIOGRAM TRACING: CPT

## 2025-06-08 PROCEDURE — 96361 HYDRATE IV INFUSION ADD-ON: CPT

## 2025-06-08 PROCEDURE — 99223 1ST HOSP IP/OBS HIGH 75: CPT | Performed by: EMERGENCY MEDICINE

## 2025-06-08 PROCEDURE — 36415 COLL VENOUS BLD VENIPUNCTURE: CPT | Performed by: EMERGENCY MEDICINE

## 2025-06-08 PROCEDURE — 2500000001 HC RX 250 WO HCPCS SELF ADMINISTERED DRUGS (ALT 637 FOR MEDICARE OP): Performed by: EMERGENCY MEDICINE

## 2025-06-08 PROCEDURE — 82550 ASSAY OF CK (CPK): CPT

## 2025-06-08 RX ORDER — OLANZAPINE 10 MG/1
1 TABLET, FILM COATED ORAL NIGHTLY
COMMUNITY
Start: 2017-03-10

## 2025-06-08 RX ORDER — HYDROXYZINE PAMOATE 25 MG/1
25 CAPSULE ORAL EVERY 6 HOURS PRN
COMMUNITY
Start: 2025-05-13

## 2025-06-08 RX ADMIN — SODIUM CHLORIDE 1000 ML: 0.9 INJECTION, SOLUTION INTRAVENOUS at 13:26

## 2025-06-08 RX ADMIN — SODIUM CHLORIDE 1000 ML: 0.9 INJECTION, SOLUTION INTRAVENOUS at 17:24

## 2025-06-08 RX ADMIN — SODIUM CHLORIDE 1000 ML: 0.9 INJECTION, SOLUTION INTRAVENOUS at 23:51

## 2025-06-08 RX ADMIN — ACETAMINOPHEN 975 MG: 325 TABLET ORAL at 02:46

## 2025-06-08 SDOH — HEALTH STABILITY: MENTAL HEALTH: BEHAVIORAL HEALTH(WDL): EXCEPTIONS TO WDL

## 2025-06-08 SDOH — HEALTH STABILITY: MENTAL HEALTH: NEEDS EXPRESSED: DENIES

## 2025-06-08 SDOH — HEALTH STABILITY: MENTAL HEALTH: HALLUCINATION: VISUAL;AUDITORY

## 2025-06-08 SDOH — HEALTH STABILITY: MENTAL HEALTH: BEHAVIORS/MOOD: WITHDRAWN;COOPERATIVE

## 2025-06-08 ASSESSMENT — PAIN - FUNCTIONAL ASSESSMENT: PAIN_FUNCTIONAL_ASSESSMENT: 0-10

## 2025-06-08 ASSESSMENT — PAIN SCALES - GENERAL: PAINLEVEL_OUTOF10: 0 - NO PAIN

## 2025-06-08 NOTE — PROGRESS NOTES
Pharmacy Medication History Review    Nathan Vaughn is a 40 y.o. male admitted for No Principal Problem: There is no principal problem currently on the Problem List. Please update the Problem List and refresh.. Pharmacy reviewed the patient's [id number] medications and allergies for accuracy.    Medications ADDED:  N/A  Medications CHANGED:  Vistaril   Zyprexa   Medications REMOVED/NOT TAKING:   Tylenol   Haldol   Melatonin   Multi Vit      The list below reflects the updated PTA list.   Prior to Admission Medications   Prescriptions Last Dose Informant   FLUoxetine (PROzac) 20 mg capsule Past Week Self   Sig: Take 1 capsule (20 mg) by mouth once daily.   OLANZapine (ZyPREXA) 10 mg tablet Past Week Self   Sig: Take 1 tablet (10 mg) by mouth once daily at bedtime.   OLANZapine (ZyPREXA) 15 mg tablet Not Taking Self   Sig: Take 1 tablet (15 mg) by mouth once daily at bedtime.   Patient not taking: Reported on 6/8/2025   acetaminophen (Tylenol) 325 mg tablet Not Taking Self   Sig: Take 2 tablets (650 mg) by mouth every 4 hours if needed for moderate pain (4 - 6).   Patient not taking: Reported on 6/8/2025   haloperidol (Haldol) 5 mg tablet Not Taking Self   Sig: Take 1 tablet (5 mg) by mouth every 6 hours if needed for agitation.   Patient not taking: Reported on 6/8/2025   hydrOXYzine pamoate (Vistaril) 25 mg capsule Past Week Self   Sig: Take 1 capsule (25 mg) by mouth every 6 hours if needed for anxiety.   hydrOXYzine pamoate (Vistaril) 50 mg capsule Not Taking Self   Sig: Take 1 capsule (50 mg) by mouth every 6 hours if needed for anxiety (1st line).   Patient not taking: Reported on 6/8/2025   melatonin 3 mg tablet Not Taking Self   Sig: Take 1 tablet (3 mg) by mouth once daily.   Patient not taking: Reported on 6/8/2025   multivitamin with minerals tablet Not Taking Self   Sig: Take 1 tablet by mouth once daily.   Patient not taking: Reported on 6/8/2025      Facility-Administered Medications: None     "    The list below reflects the updated allergy list. Please review each documented allergy for additional clarification and justification.  Allergies  Reviewed by Breanna Phoenix on 6/8/2025   No Known Allergies         Patient accepts M2B at discharge.     Sources:   [facility]  Pharmacy dispense history  Patient Interview Moderate historian  Chart Review     Additional Comments:  Pt confirmed his meds, dosages, and last taken at home.   Pt reported he was taking 40mg tabs of Prozac, fill history has been for 20mg tabs.    Pt reported that he was taking 15mg tabs of Zyprexa, the most recent fill was for the 10mg tabs.       JENNIFER MUHAMMAD [place]  Pharmacy Technician  06/08/25     Secure Chat preferred   If no response call [phone] or [employer]treasure[facility] \"Med Rec\"   "

## 2025-06-08 NOTE — PROGRESS NOTES
EPAT - Social Work Psychiatric Assessment    Arrival Details  Mode of Arrival: Ambulance  Admission Source: Other (Comment) (Community)  Admission Type: Voluntary  EPAT Assessment Start Date: 06/07/25  EPAT Assessment Start Time: 2330  Name of : Erica HOWE, STEFANW    History of Present Illness  Admission Reason: Psychiatric Evaluation  HPI: Nathan Vaughn is a 40-year-old male presenting to the ED via EMS for a psychiatric evaluation. Reportedly, “Pt came to the ED with c/o suicidal thoughts and homicidal thoughts x4 days. Pt reports having plan but not wanting to talk about it. Pt states he is homicidal towards “perpetrators.” Pt reports hearing voices behind him.” Pt is noted to be ‘high risk’ on the [place] Suicide Screening completed at triage. Pt’s chart, triage and provider note all reviewed prior to assessment. On arrival, pt BAL is negative and UDS is unavailable.            Pt has a psychiatric history of antisocial personality disorder, and likely substance-induced psychotic/mood disorder. Pt is well-known to this writer/service/ED and surrounding emergency services due to frequent utilization. The pt has a significant history of inpatient admissions - he is typically medically admitted due to elevated CK in the setting of chronic stimulant abuse and then transferred to a psychiatric unit once managed. The pt demonstrates chronic non-compliance with outpatient treatment due to being in the hospital more frequently than being in the community. The pt is known to be vague with reports of SI/HI and will often grandstand/escalate himself when asked to clarify reports, likely in an attempt to intimidate the .    SW Readmission Information   Readmission within 30 Days: No    Psychiatric Symptoms  Anxiety Symptoms: Generalized  Depression Symptoms: Feelings of helplessness, Feelings of hopelessess, Feelings of worthlessness, Appetite change, Sleep disturbance  Gely Symptoms: No problems  reported or observed.    Psychosis Symptoms  Hallucination Type: Visual, Auditory  Delusion Type: No problems reported or observed.    Additional Symptoms - Adult  Generalized Anxiety Disorder: Difficult to control worry, Excessive anxiety/worry  Obsessive Compulsive Disorder: No problems reported or observed.  Panic Attack: No problems reported or observed.  Post Traumatic Stress Disorder: No problems reported or observed.  Delirium: No problems reported or observed.    Past Psychiatric History/Meds/Treatments  Past Psychiatric History: hx of antisocial personality disorder,substance-induced psychotic/mood disorder  Past Psychiatric Meds/Treatments: historically non-compliant // last admission 04/2025 at     Current Mental Health Contacts   Name/Phone Number: Denies  Provider Name/Phone Number: Denies    Support System: Community, Friends    Living Arrangement: Homeless    Income Information  Employment Status for: Patient  Employment Status: Unemployed  Income Source: Unemployed    [employer] Service/Education History  Current or Previous  Service: None    Social/Cultural History  Social History: Pt is a [place] citizen // no guardian // no payee  Cultural Requests During Hospitalization: Denies  Spiritual Requests During Hospitalization: Denies    Legal  Legal Considerations: Patient/ Family Capacity to Make Sound Judgments  Criminal Activity/ Legal Involvement Pertinent to Current Situation/ Hospitalization: None    Drug Screening  Have you used any substances (canabis, cocaine, heroin, hallucinogens, inhalants, etc.) in the past 12 months?: Yes  Have you used any prescription drugs other than prescribed in the past 12 months?: Yes  Is a toxicology screen needed?: Yes    Stage of Change  Stage of Change: Precontemplation  History of Treatment: Dual, Inpatient  Type of Treatment Offered: Inpatient  Treatment Offered: Accepted    Orientation  Orientation Level: Oriented X4    General  "Appearance  Motor Activity: Unremarkable  Speech Pattern:  (Unremarkable)  General Attitude: Cooperative, Withdrawn  Appearance/Hygiene: Unremarkable    Thought Process  Coherency: [place] thinking  Content: Unremarkable  Delusions:  (None)  Perception: Not altered  Hallucination: Visual, Auditory  Judgment/Insight: Limited  Confusion: None  Cognition: Impulsive, Poor judgement, Poor safety awareness    Sleep Pattern  Sleep Pattern: Restlessness    Risk Factors  Self Harm/Suicidal Ideation Plan: Pt states plan to jump off a bridge or cut his wrists  Previous Self Harm/Suicidal Plans: 4 yrs ago - attempted to cut wrists  Risk Factors: Male, Major mental illness, Substance abuse, Personality disorder (antisocial, borderline)    Violence Risk Assessment  Thoughts of Harm to Others: Yes - currently present  Homicidal ideation: Yes - currently present  Current Homicidal Intent: No  Current Homicidal Plan: No  Access to Homicidal Means: No  Identified Victim: \"fake people\"  History of Harm to Others: No  Access to Weapons: No  Criminal Charges Pending: No    Ability to Assess Risk Screen  Risk Screen - Ability to Assess: Able to be screened  [place] Suicide Severity Rating Scale (Screener/Recent Self-Report)  1. Wish to be Dead (Past 1 Month): Yes  2. Non-Specific Active Suicidal Thoughts (Past 1 Month): Yes  3. Active Suicidal Ideation with any Methods (Not Plan) Without Intent to Act (Past 1 Month): Yes  4. Active Suicidal Ideation with Some Intent to Act, Without Specific Plan (Past 1 Month): Yes  5. Active Suicidal Ideation with Specific Plan and Intent (Past 1 Month): Yes  6. Suicidal Behavior (Lifetime): Yes  6. Suicidal Behavior (3 Months): No  Calculated C-SSRS Risk Score (Lifetime/Recent): High Risk  Step 1: Risk Factors  Current & Past Psychiatric Dx: Conduct problems (antisocial behavior, aggression, impulsivity), Cluster B personality disorders or traits (i.e., borderline, antisocial, histrionic & " narcissistic), Mood disorder, Alcohol/substance abuse disorders  Precipitants/Stressors: Pending incarceration or homelessness, Substance intoxication or withdrawal, Inadequate social supports  Change in Treatment: Non-compliant or not receiving treatment  Access to Lethal Methods : No  Step 3: Suicidal Ideation Intensity  How Many Times Have You Had These Thoughts: 2-5 times in a week  When You Have the Thoughts How Long do They Last : 1-4 hours/a lot of the time  Could/Can You Stop Thinking About Killing Yourself or Wanting to Die if You Want to: Can control thoughts with some difficulty  Are There Things - Anyone or Anything - That Stopped You From Wanting to Die or Acting on: Uncertain that deterrents stopped you  What Sort of Reasons Did You Have For Thinking About Wanting to Die or Killing Yourself: Equally to get attention, revenge or a reaction from others and to end/stop the pain  Total Score: 15  Step 5: Documentation  Risk Level: Moderate suicide risk (Pt a moderate risk of harm to self in the setting of inpatient psych)    Psychiatric Impression and Plan of Care  Assessment and Plan: On assessment, pt is seen lying comfortably in his hospital bed with his eyes closed. Pt rousable to voice and remains calm and cooperative throughout the assessment. He presents A&O x4, demonstrating a concrete thought process. His speech is normal in tone, rate, and volume. There are no loosening of associations or delusions present on assessment. Pt does not appear to be responding to any internal stimuli.      Pt states he presents to the ED to “get my mental right.” Pt endorsing SI with a plan to “jump off a building,” or “cut my wrists.” Pt endorsing these thoughts began about four days ago. Pt states he would be unable to keep himself safe if discharged from the ED. States that his last suicide attempt was via cutting about four years ago. Denies any NSSIB. Denies access to lethal means such as guns. Pt endorsing HI  "towards “fake people.” He remains vague when asked further about intent/plan. Pt endorsing AVH, stating “sometimes I hear voices and see people.”      Diagnostic Impression: Unspecified Mood Disorder versus Substance Induced Mood Disorder      Psychiatric Recommendation and Plan for Care: Pt currently presenting as an elevated risk of harm to himself and others, recommendation for admission. Discussed with Viktoriya Moore MD who is in agreement.    Specific Resources Provided to Patient: Admission    Outcome/Disposition  Patient's Perception of Outcome Achieved: \"I need to get my mental right\"  Assessment, Recommendations and Risk Level Reviewed with: Viktoriya Moore MD  EPAT Assessment Completed Date: 06/08/25  EPAT Assessment Completed Time: 0058  "

## 2025-06-08 NOTE — PROGRESS NOTES
"Capacity Assessment Tool    \"Capacity\" is the \"ability\" to make a decision.  The decision in question must be specific (one decision), relevant to a patient's current condition (appropriate), and timely (neither prospective nor retrospective).    Capacity varies based on knowledge base (explanation/understanding of clinical information), cognitive processing, acute psychiatric illness, and other clinical conditions.    In order to be deemed \"capacitated\" to make a single decision at one point in time, a patient must demonstrate all 4 of the following elements:    *Ability to consistently communicate a choice (consistent over time with adequate information)  *Ability to understand the relevant information (accurate knowledge of condition)  *Ability to appreciate the situation and its consequences (risks/benefits, pros/cons)  *Ability to reason about treatment options (without undue influence of a person or condition, eg. suicidality or acute psychosis)      Current Decision    Clinical issue:   Suicidal ideation, homicidal ideation, auditory and visual hallucinations    Did the appropriate team address relevant information with the patient:  Yes    Date: 6/7/25    If \"NO\" is selected for appropriate team, then please discuss with the appropriate team.  The appropriate team should be encouraged to address relevant information with the patient AND reevaluate capacity when appropriate.    Capacity Evaluation    Patient demonstrates ability to consistently communicate choice:  Yes     Patient demonstrates ability to understand the relevant information:  Yes     Patient demonstrates ability to appreciate the situation and its consequences:  No    Patient demonstrates ability to reason about treatment options:  Yes     If ANY of the above items are answered \"NO,\" the patient LACKS CAPACITY for that specific decision at hand, at that specific time.  Further capacity evaluations can be done as needed.         "

## 2025-06-08 NOTE — ED PROVIDER NOTES
"EMERGENCY DEPARTMENT ENCOUNTER      Pt Name: aNthan Vaughn  MRN: [id number]  Birthdate 1985  Date of evaluation: 6/7/2025  Provider: Villa Tafoya DO    CHIEF COMPLAINT       Chief Complaint   Patient presents with    Suicidal     HISTORY OF PRESENT ILLNESS    Nathan Vaughn is a 40 y.o. year old male who presents to the ER for suicidal ideations and homicidal ideations.  Reports that he does have plans to kill himself by \"either jumping off of a bridge or spazzing out against the wrong person\", he does have plans to murder his \"fake friends\" who he claims \"pull guns on me all the time for no good reason\".  Does endorse auditory hallucinations and visual hallucinations as well.  Claims he is supposed to be on Zyprexa 15 mg, Prozac 40 mg however he has been noncompliant with his medications for 4 or 5 days.  Does endorse slight headache, denies fever, chest pain, shortness of breath, nausea or vomiting, abdominal pain, falls, changes to urine or stool.    Last admitted to inpatient psych 1 month ago, would like to return    PMH none  PSH none  NKDA  Endorses alcohol and tobacco use, denies use today, denies IV drug use     PAST MEDICAL HISTORY   Medical History[1]  CURRENT MEDICATIONS       Previous Medications    ACETAMINOPHEN (TYLENOL) 325 MG TABLET    Take 2 tablets (650 mg) by mouth every 4 hours if needed for moderate pain (4 - 6).    FLUOXETINE (PROZAC) 20 MG CAPSULE    Take 1 capsule (20 mg) by mouth once daily.    HALOPERIDOL (HALDOL) 5 MG TABLET    Take 1 tablet (5 mg) by mouth every 6 hours if needed for agitation.    HYDROXYZINE PAMOATE (VISTARIL) 50 MG CAPSULE    Take 1 capsule (50 mg) by mouth every 6 hours if needed for anxiety (1st line).    MELATONIN 3 MG TABLET    Take 1 tablet (3 mg) by mouth once daily.    MULTIVITAMIN WITH MINERALS TABLET    Take 1 tablet by mouth once daily.    OLANZAPINE (ZYPREXA) 15 MG TABLET    Take 1 tablet (15 mg) by mouth once daily at bedtime.     SURGICAL HISTORY  "    Surgical History[2]  ALLERGIES     Patient has no known allergies.  FAMILY HISTORY     Family History[3]  SOCIAL HISTORY     Social History[4]  PHYSICAL EXAM  (up to 7 for level 4, 8 or more for level 5)     ED Triage Vitals [06/07/25 2131]   Temperature Heart Rate Respirations BP   36.1 °C (97 °F) 97 15 (!) 156/104      Pulse Ox Temp Source Heart Rate Source Patient Position   98 % Temporal Monitor Sitting      BP Location FiO2 (%)     Left arm --       Physical Exam  Vitals and nursing note reviewed.   Constitutional:       General: He is not in acute distress.     Appearance: Normal appearance. He is not ill-appearing.   HENT:      Head: Normocephalic and atraumatic. No raccoon eyes, Cortes's sign, contusion or laceration.      Jaw: No trismus or malocclusion.      Right Ear: External ear normal.      Left Ear: External ear normal.      Mouth/Throat:      Mouth: Mucous membranes are moist.      Pharynx: Oropharynx is clear.   Eyes:      Extraocular Movements: Extraocular movements intact.      Pupils: Pupils are equal, round, and reactive to light.   Neck:      Trachea: No tracheal deviation.   Cardiovascular:      Rate and Rhythm: Normal rate and regular rhythm.      Pulses: Normal pulses.   Pulmonary:      Effort: No respiratory distress.      Breath sounds: No wheezing, rhonchi or rales.   Chest:      Chest wall: No tenderness.   Abdominal:      General: Abdomen is flat.      Palpations: Abdomen is soft. There is no mass.      Tenderness: There is no abdominal tenderness.   Musculoskeletal:         General: No tenderness or signs of injury.      Right lower leg: No edema.      Left lower leg: No edema.   Skin:     Coloration: Skin is not jaundiced or pale.      Findings: No petechiae, rash or wound.   Neurological:      Mental Status: He is alert.      Sensory: No sensory deficit.      Motor: No weakness.        DIAGNOSTIC RESULTS   LABS:  Labs Reviewed   CBC WITH AUTO DIFFERENTIAL - Abnormal       Result  Value    WBC 5.7      nRBC 0.0      RBC 4.99      Hemoglobin 13.6      Hematocrit 40.4 (*)     MCV 81      MCH 27.3      MCHC 33.7      RDW 12.9      Platelets 382      Neutrophils % 42.0      Immature Granulocytes %, Automated 0.2      Lymphocytes % 38.9      Monocytes % 14.3      Eosinophils % 4.1      Basophils % 0.5      Neutrophils Absolute 2.37      Immature Granulocytes Absolute, Automated 0.01      Lymphocytes Absolute 2.20      Monocytes Absolute 0.81      Eosinophils Absolute 0.23      Basophils Absolute 0.03     COMPREHENSIVE METABOLIC PANEL - Abnormal    Glucose 93      Sodium 139      Potassium 3.5      Chloride 101      Bicarbonate 27      Anion Gap 15      Urea Nitrogen 20      Creatinine 1.03      eGFR >90      Calcium 9.7      Albumin 4.7      Alkaline Phosphatase 78      Total Protein 8.6 (*)     AST 94 (*)     Bilirubin, Total 1.1      ALT 79 (*)    ACUTE TOXICOLOGY PANEL, BLOOD - Normal    Acetaminophen <10.0      Salicylate  <3      Alcohol <10     DRUG SCREEN,URINE     All other labs were within normal range or not returned as of this dictation.  Imaging  No orders to display      Procedure  Procedures  EMERGENCY DEPARTMENT COURSE/MDM:   Medical Decision Making    Vitals:    Vitals:    06/07/25 2131   BP: (!) 156/104   BP Location: Left arm   Patient Position: Sitting   Pulse: 97   Resp: 15   Temp: 36.1 °C (97 °F)   TempSrc: Temporal   SpO2: 98%     Nathan Vaughn is a male 40 y.o. who presents to the ER for suicidal ideations, homicidal ideations, auditory and visual hallucinations. On arrival the patients vital signs were: Afebrile, regular heart rate, normotensive, regular respiration rate, normoxic on room air. History obtained from: patient.  Given suicidal ideation, minor headache, no additional symptoms, unremarkable exam plan for treatment of headache with acetaminophen, psychiatric workup including CBC, CMP, acute blood tox, urine drug screen, EKG, EPAT assessment for disposition.  ED  Course as of 06/07/25 2257   Sat Jun 07, 2025 2248 Acute Toxicology Panel, Blood  No elevation of alcohol, salicylate, acetaminophen [CB]   2248 CBC and Auto Differential(!)  No acute leukocytosis, leukopenia, thrombocytosis, thrombocytopenia, or anemia [CB]   2248 Comprehensive Metabolic Panel(!)  Nonspecific transaminitis, otherwise no acute electrolyte or renal abnormality [CB]   2248 Medically clear for EPAT to see [CB]   2257 Handed off to oncoming provider, Dr. Moore, pending EPAT eval for dispo [CB]      ED Course User Index  [CB] Villa Tafoya DO         Diagnoses as of 06/07/25 2257   Suicidal ideation   Homicidal ideation   Auditory hallucination       External Records Reviewed: I reviewed recent and relevant outside records including inpatient notes, outpatient records    ED Medications administered this visit:    Medications   acetaminophen (Tylenol) tablet 975 mg (has no administration in time range)        Final Impression:   1. Suicidal ideation    2. Homicidal ideation    3. Auditory hallucination          Please excuse any misspellings or unintended errors related to the Dragon speech recognition software used to dictate this note.    I reviewed the case with the attending ED physician. The attending ED physician agrees with the plan.          [1]   Past Medical History:  Diagnosis Date    Aggressive behavior 07/26/2019    Cannabis use disorder 03/15/2023    Cellulitis 11/01/2021    Formatting of this note might be different from the original. Last Assessment & Plan: Formatting of this note might be different from the original. Assessment: Secondary to dog bite PLAN: - See plan for dog bite Last Assessment & Plan: Formatting of this note might be different from the original. Assessment: Secondary to dog bite PLAN: - See plan for dog bite    Electrolyte disorder 07/23/2019    Homicidal thoughts 11/04/2023    Methamphetamine intoxication (Multi) 07/24/2019    Polysubstance dependence,  non-opioid, in remission (Multi) 04/15/2022    Formatting of this note might be different from the original. Dx 2014 with amphetamine, cannabis, alcohol, and hallucinogen mixed abuse/dependence Last Assessment & Plan: Formatting of this note might be different from the original. A: active severe problem, with unclear dependency/severity. 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 Last Assessment & Plan: Formatting of this note might be different from the original. Assessment: - Chronically elevated platelet count >400k since 9/30/2021 - currently not on any medications PLAN: - start aspirin 81 mg daily Last Assessment & Plan: Formatting of this note might be different from the original. Assessment: - Chronically   [2] No past surgical history on file.  [3] No family history on file.  [4]   Social History  Tobacco Use    Smoking status: Every Day     Current packs/day: 1.00     Average packs/day: 1 pack/day for 20.0 years (20.0 ttl pk-yrs)     Types: Cigarettes    Smokeless tobacco: Never   Substance Use Topics    Alcohol use: Yes    Drug use: Yes     Types: Methamphetamines, Cocaine, Marijuana        Villa Tafoya DO  Resident  06/07/25 1761

## 2025-06-08 NOTE — PROGRESS NOTES
"Emergency Department Transition of Care Note       Signout   I received Nathan Vaughn in signout from Dr. Tafoya.  Please see the ED Provider Note for all HPI, PE and MDM up to the time of signout at 2300.  This is in addition to the primary record.    In brief Nathan Vaughn is an 40 y.o. male presenting for suicidal and homicidal ideation, with plan to jump off a bridge or instigate a fight with \"the wrong person.\"  Also reporting auditory visual hallucinations and reported his been off of his Zyprexa and Prozac for 4 to 5 days    At the time of signout we were awaiting:  EPAT evaluation    ED Course & Medical Decision Making   Medical Decision Making:  EPAT evaluated the patient and recommended inpatient psychiatric care.  Patient is agreeable to this.  EPAT did ask for CK in the setting of patient's cocaine use, and this was elevated, however patient does not have any EDUARDO, so not in rhabdomyolysis at this time. PO fluids encouraged and repeat CK drawn in AM.    Patient remained calm and cooperative throughout my shift.  Signed out to oncoming provider pending EPAT to place.      ED Course:  ED Course as of 06/08/25 0825   Sat Jun 07, 2025   2248 Acute Toxicology Panel, Blood  No elevation of alcohol, salicylate, acetaminophen [CB]   2248 CBC and Auto Differential(!)  No acute leukocytosis, leukopenia, thrombocytosis, thrombocytopenia, or anemia [CB]   2248 Comprehensive Metabolic Panel(!)  Nonspecific transaminitis, otherwise no acute electrolyte or renal abnormality [CB]   2248 Medically clear for EPAT to see [CB]   2257 Handed off to oncoming provider, Dr. Moore, pending EPAT eval for dispo [CB]   Sun Jun 08, 2025   0713 Electrocardiogram, 12-lead  EKG obtained at 0637 and interpreted by me: Normal sinus rhythm with a rate of 75 bpm, normal axis.  Normal intervals, QTc 495.  No ST-T changes concerning for ischemia. Compared to prior EKG on 4/21/2025, there is no significant change. [HH]      ED Course " User Index  [CB] Villa Tafoya DO  [HH] Viktoriya Moore MD         Diagnoses as of 06/08/25 0825   Suicidal ideation   Homicidal ideation   Auditory hallucination       Disposition   Patient was signed out to Elsa Garrett NP at 0700 pending completion of their work-up.  Please see the next provider's transition of care note for the remainder of the patient's care.       Patient seen and discussed with ED attending physician.    Viktoriya Moore MD  Emergency Medicine PGY2

## 2025-06-08 NOTE — PROGRESS NOTES
"Observation History and Physical  [facility] EMERGENCY MEDICINE           History of Present Illness     History provided by: Patient  Limitations to History: None  External Records Reviewed: Prior ER visitations      Patient History:  Nathan Vaughn is a 40 y.o. male who presented to the emergency department for suicidal ideations and homicidal ideations.  Patient states that he has a plan to kill himself by either jumping off a bridge or spasm out against the wrong person.  Additionally he wants to murder his fake friends who he claims pulled guns on him all the time for no good reason.  Additionally is endorsing auditory hallucinations and visual hallucinations.  He has been noncompliant with his Zyprexa and Prozac for approximately 4 to 5 days.  He appears decompensated from a psychiatric standpoint.    Nathan Vaughn was escalated to observation status. Observation was necessary as they continue to require treatment and monitoring of their psychiatric illness while awaiting inpatient behavioral health bed availability.    Physical Exam     Visit Vitals  /74 (BP Location: Right arm, Patient Position: Lying)   Pulse 76   Temp 37.1 °C (98.8 °F) (Oral)   Resp 16   Ht 1.778 m (5' 10\")   Wt 111 kg (245 lb)   SpO2 95%   BMI 35.15 kg/m²   Smoking Status Every Day   BSA 2.34 m²       GENERAL:  The patient appears nourished and normally developed. Vital signs as documented.     PULMONARY:  Without any respiratory distress. Able to speak full sentences, no accessory muscle use    CARDIAC: Warm and well perfused. No cyanosis.    MUSCULOSKELETAL:   Able to ambulate, Non edematous, with no obvious deformities.     SKIN: No pallor. Intact.    NEURO:  No obvious neurological deficits.  Able to follow commands.    Psych: Calm and cooperative, internally stimulated, withdrawn, labile      Impression and Plan     ED Course as of 06/08/25 0819   Sat Jun 07, 2025   3101 Acute Toxicology Panel, Blood  No " elevation of alcohol, salicylate, acetaminophen [CB]   2248 CBC and Auto Differential(!)  No acute leukocytosis, leukopenia, thrombocytosis, thrombocytopenia, or anemia [CB]   2248 Comprehensive Metabolic Panel(!)  Nonspecific transaminitis, otherwise no acute electrolyte or renal abnormality [CB]   2248 Medically clear for EPAT to see [CB]   2257 Handed off to oncoming provider, Dr. Moore, pending EPAT eval for dispo [CB]   Sun Jun 08, 2025   0713 Electrocardiogram, 12-lead  EKG obtained at 0637 and interpreted by me: Normal sinus rhythm with a rate of 75 bpm, normal axis.  Normal intervals, QTc 495.  No ST-T changes concerning for ischemia. Compared to prior EKG on 4/21/2025, there is no significant change. [HH]      ED Course User Index  [CB] Villa Tafoya DO  [HH] Viktoriya Moore MD         Diagnoses as of 06/08/25 0819   Suicidal ideation   Homicidal ideation   Auditory hallucination        Nathan Vaughn under observation status in [facility] EMERGENCY MEDICINE for psychiatric illness monitoring and treatment while awaiting inpatient behavioral health bed availability.   Emergency Psychiatric Assessment Team has been consulted. Case discussed with them and decision for inpatient hospitalization deemed necessary. Patient is medically clear at this time.     Home medication reconciliation was reviewed and restarted where clinically indicated.     Patient and Family updated on plan of care.     Elsa Garrett, APRN-CNP

## 2025-06-09 VITALS
WEIGHT: 245 LBS | HEART RATE: 74 BPM | TEMPERATURE: 97.7 F | DIASTOLIC BLOOD PRESSURE: 70 MMHG | OXYGEN SATURATION: 96 % | HEIGHT: 70 IN | SYSTOLIC BLOOD PRESSURE: 106 MMHG | BODY MASS INDEX: 35.07 KG/M2 | RESPIRATION RATE: 16 BRPM

## 2025-06-09 LAB
ATRIAL RATE: 75 BPM
CK SERPL-CCNC: 1136 U/L (ref 0–325)
CK SERPL-CCNC: 969 U/L (ref 0–325)
P AXIS: 57 DEGREES
P OFFSET: 206 MS
P ONSET: 152 MS
PR INTERVAL: 136 MS
Q ONSET: 220 MS
QRS COUNT: 12 BEATS
QRS DURATION: 82 MS
QT INTERVAL: 444 MS
QTC CALCULATION(BAZETT): 495 MS
QTC FREDERICIA: 478 MS
R AXIS: 71 DEGREES
T AXIS: 60 DEGREES
T OFFSET: 442 MS
VENTRICULAR RATE: 75 BPM

## 2025-06-09 PROCEDURE — 36415 COLL VENOUS BLD VENIPUNCTURE: CPT

## 2025-06-09 PROCEDURE — 96360 HYDRATION IV INFUSION INIT: CPT | Mod: 59

## 2025-06-09 PROCEDURE — 96361 HYDRATE IV INFUSION ADD-ON: CPT

## 2025-06-09 PROCEDURE — G0378 HOSPITAL OBSERVATION PER HR: HCPCS

## 2025-06-09 PROCEDURE — 99233 SBSQ HOSP IP/OBS HIGH 50: CPT | Performed by: STUDENT IN AN ORGANIZED HEALTH CARE EDUCATION/TRAINING PROGRAM

## 2025-06-09 PROCEDURE — 82550 ASSAY OF CK (CPK): CPT

## 2025-06-09 SDOH — HEALTH STABILITY: MENTAL HEALTH: BEHAVIORS/MOOD: WITHDRAWN;COOPERATIVE

## 2025-06-09 SDOH — HEALTH STABILITY: MENTAL HEALTH: DELUSIONS: OTHER (COMMENT)

## 2025-06-09 SDOH — HEALTH STABILITY: MENTAL HEALTH: CONTENT: UNREMARKABLE

## 2025-06-09 SDOH — HEALTH STABILITY: MENTAL HEALTH: BEHAVIORAL HEALTH(WDL): EXCEPTIONS TO WDL

## 2025-06-09 ASSESSMENT — PAIN SCALES - GENERAL: PAINLEVEL_OUTOF10: 0 - NO PAIN

## 2025-06-09 NOTE — PROGRESS NOTES
"Observation Disposition Note  [facility] EMERGENCY MEDICINE             Subjective:       Nathan Vaughn has been under observation status in [facility] EMERGENCY MEDICINE for psychiatric illness monitoring and treatment while awaiting inpatient behavioral health bed availability.       Physical Exam     Visit Vitals  /70   Pulse 74   Temp 36.5 °C (97.7 °F) (Oral)   Resp 16   Ht 1.778 m (5' 10\")   Wt 111 kg (245 lb)   SpO2 96%   BMI 35.15 kg/m²   Smoking Status Every Day   BSA 2.34 m²       GENERAL:  The patient appears nourished and normally developed. Vital signs as documented.     PULMONARY:  Without any respiratory distress. Able to speak full sentences, no accessory muscle use    CARDIAC: Warm and well perfused. No cyanosis.    MUSCULOSKELETAL:   Able to ambulate, Non edematous, with no obvious deformities.     SKIN: No pallor. Intact.    NEURO:  No obvious neurological deficits.  Able to follow commands.    Psych: Appropriate mood and affect, thought content is including suicidal attempts and ideation      Impresssion and Plan     In summary, Nathan Vaughn has been cared under observation in [facility] for psychiatric illness monitoring and treatment while awaiting inpatient behavioral health bed availability.     Emergency Psychiatric Assessment Team was consulted. Case discussed with them and decision for inpatient hospitalization deemed necessary.     Patient has been accepted at [facility]    Total length of observation was 36 hours. Dr. Jered Naranjo MD is the disposition attending.    Discharge Diagnosis  Suicidal ideation    Villa Tafoya DO  "

## 2025-06-09 NOTE — PROGRESS NOTES
"Observation History and Physical  [facility] EMERGENCY MEDICINE           History of Present Illness     History provided by: Patient  Limitations to History: Mental Illness  External Records Reviewed: None      Patient History:  Nathan Vaughn is a 40 y.o. male who presented to the emergency department  for suicidal ideations and homicidal ideations.  Reports that he does have plans to kill himself by \"either jumping off of a bridge or spazzing out against the wrong person\", he does have plans to murder his \"fake friends\" who he claims \"pull guns on me all the time for no good reason\".  Does endorse auditory hallucinations and visual hallucinations as well.  Claims he is supposed to be on Zyprexa 15 mg, Prozac 40 mg however he has been noncompliant with his medications for 4 or 5 days.  Does endorse slight headache, denies fever, chest pain, shortness of breath, nausea or vomiting, abdominal pain, falls, changes to urine or stool.     Nathan Vaughn was escalated to observation status. Observation was necessary as they continue to require treatment and monitoring of their psychiatric illness while awaiting inpatient behavioral health bed availability.    Physical Exam     Visit Vitals  /70   Pulse 74   Temp 36.5 °C (97.7 °F) (Oral)   Resp 16   Ht 1.778 m (5' 10\")   Wt 111 kg (245 lb)   SpO2 96%   BMI 35.15 kg/m²   Smoking Status Every Day   BSA 2.34 m²       GENERAL:  The patient appears nourished and normally developed. Vital signs as documented.     PULMONARY:  Without any respiratory distress. Able to speak full sentences, no accessory muscle use    CARDIAC: Warm and well perfused. No cyanosis.    MUSCULOSKELETAL:   Able to ambulate, Non edematous, with no obvious deformities.     SKIN: No pallor. Intact.    NEURO:  No obvious neurological deficits.  Able to follow commands.    Psych: Appropriate mood and affect, thought content including suicidal and homicidal ideation      Impression " and Samuel Vaughn under observation status in [facility] EMERGENCY MEDICINE for psychiatric illness monitoring and treatment while awaiting inpatient behavioral health bed availability.   Emergency Psychiatric Assessment Team has been consulted. Case discussed with them and decision for inpatient hospitalization deemed necessary. Patient is medically clear at this time. Home medications reviewed and restarted where clinically indicated. Patient and Family updated on plan of care.     Villa Tafoya, DO

## 2025-06-09 NOTE — SIGNIFICANT EVENT
Application for Emergency Admission      Ready for Transfer?  Is the patient medically cleared for transfer to inpatient psychiatry: Yes  Has the patient been accepted to an inpatient psychiatric hospital: Yes    Application for Emergency Admission  IN ACCORDANCE [place]  The Chief Clinical Officer of: Efrain Martinez 6/9/2025 .6:24 AM    Reason for Hospitalization  The undersigned has reason to believe that: Nathan Vaughn Is a mentally ill person subject to hospitalization by court order under division B Section 5122.01 of [place] Revised Code, i.e., this person:    1.Yes  Represents a substantial risk of physical harm to self as manifested by evidence of threats of, or attempts at, suicide or serious self-inflicted bodily harm    2.Yes Represents a substantial risk of physical harm to others as manifested by evidence of recent homicidal or other violent behavior, evidence of recent threats that place another in reasonable fear of violent behavior and serious physical harm, or other evidence of present dangerousness    3.Yes Represents a substantial and immediate risk of serious physical impairment or injury to self as manifested by  evidence that the person is unable to provide for and is not providing for the person's basic physical needs because of the person's mental illness and that appropriate provision for those needs cannot be made  immediately available in the community    4.Yes Would benefit from treatment in a hospital for his mental illness and is in need of such treatment as manifested by evidence of behavior that creates a grave and imminent risk to substantial rights of others or  himself.    5.Yes Would benefit from treatment as manifested by evidence of behavior that indicates all of the following:       (a) The person is unlikely to survive safely in the community without supervision, based on a clinical determination.       (b) The person has a history of lack of  compliance with treatment for mental illness and one of the following applies:      (i) At least twice within the thirty-six months prior to the filing of an affidavit seeking court-ordered treatment of the person under section 5122.111 of the Revised Code, the lack of compliance has been a significant factor in necessitating hospitalization in a hospital or receipt of services in a forensic or other mental health unit of a correctional facility, provided that the thirty-six-month period shall be extended by the length of any hospitalization or incarceration of the person that occurred within the thirty-six-month period.      (ii) Within the forty-eight months prior to the filing of an affidavit seeking court-ordered treatment of the person under section 5122.111 of the Revised Code, the lack of compliance resulted in one or more acts of serious violent behavior toward self or others or threats of, or attempts at, serious physical harm to self or others, provided that the forty-eight-month period shall be extended by the length of any hospitalization or incarceration of the person that occurred within the forty-eight-month period.      (c) The person, as a result of mental illness, is unlikely to voluntarily participate in necessary treatment.       (d) In view of the person's treatment history and current behavior, the person is in need of treatment in order to prevent a relapse or deterioration that would be likely to result in substantial risk of serious harm to the person or others.    (e) Represents a substantial risk of physical harm to self or others if allowed to remain at liberty pending examination.    Therefore, it is requested that said person be admitted to the above named facility.    STATEMENT OF BELIEF    Must be filled out by one of the following: a psychiatrist, licensed physician, licensed clinical psychologist, health or ,  or .  (Statement shall include the  circumstances under which the individual was taken into custody and the reason for the person's belief that hospitalization is necessary. The statement shall also include a reference to efforts made to secure the individual's property at his residence if he was taken into custody there. Every reasonable and appropriate effort should be made to take this person into custody in the least conspicuous manner possible.)    Nathan Vaughn would benefit from inpatient psychiatric hospitalization as he represents a significant harm to himself by eliciting suicidal ideation and homicidal ideation as well as auditory and visual hallucinations.    Rudy Hawkins,  6/9/2025     _____________________________________________________________   Place of Employment: [facility]    STATEMENT OF OBSERVATION BY PSYCHIATRIST, LICENSED PHYSICIAN, OR LICENSED CLINICAL PSYCHOLOGIST, IF APPLICABLE    Place of Observation (e.g., [facility], general hospital, office, emergency facility)  (If applicable, please complete)    Rudy Hawkins,  6/9/2025    _____________________________________________________________